# Patient Record
Sex: FEMALE | Race: ASIAN | NOT HISPANIC OR LATINO | URBAN - METROPOLITAN AREA
[De-identification: names, ages, dates, MRNs, and addresses within clinical notes are randomized per-mention and may not be internally consistent; named-entity substitution may affect disease eponyms.]

---

## 2017-06-18 ENCOUNTER — INPATIENT (INPATIENT)
Facility: HOSPITAL | Age: 37
LOS: 11 days | Discharge: HOME | End: 2017-06-30
Attending: HOSPITALIST | Admitting: HOSPITALIST
Payer: MEDICAID

## 2017-06-18 VITALS
SYSTOLIC BLOOD PRESSURE: 121 MMHG | OXYGEN SATURATION: 96 % | RESPIRATION RATE: 26 BRPM | DIASTOLIC BLOOD PRESSURE: 81 MMHG | HEART RATE: 98 BPM | TEMPERATURE: 100 F

## 2017-06-18 LAB
BASE EXCESS BLDV CALC-SCNC: 2.2 MMOL/L — SIGNIFICANT CHANGE UP
BASOPHILS # BLD AUTO: 0.01 K/UL — SIGNIFICANT CHANGE UP (ref 0–0.2)
BASOPHILS NFR BLD AUTO: 0.1 % — SIGNIFICANT CHANGE UP (ref 0–2)
BLOOD GAS VENOUS - CREATININE: 0.61 MG/DL — SIGNIFICANT CHANGE UP (ref 0.5–1.3)
CHLORIDE BLDV-SCNC: 104 MMOL/L — SIGNIFICANT CHANGE UP (ref 96–108)
EOSINOPHIL # BLD AUTO: 0.07 K/UL — SIGNIFICANT CHANGE UP (ref 0–0.5)
EOSINOPHIL NFR BLD AUTO: 0.8 % — SIGNIFICANT CHANGE UP (ref 0–6)
GAS PNL BLDV: 134 MMOL/L — LOW (ref 136–146)
GLUCOSE BLDV-MCNC: 105 — HIGH (ref 70–99)
HCO3 BLDV-SCNC: 25 MMOL/L — SIGNIFICANT CHANGE UP (ref 20–27)
HCT VFR BLD CALC: 26.6 % — LOW (ref 34.5–45)
HCT VFR BLDV CALC: 25.5 % — LOW (ref 34.5–45)
HGB BLD-MCNC: 8 G/DL — LOW (ref 11.5–15.5)
HGB BLDV-MCNC: 8.2 G/DL — LOW (ref 11.5–15.5)
IMM GRANULOCYTES NFR BLD AUTO: 0.6 % — SIGNIFICANT CHANGE UP (ref 0–1.5)
LACTATE BLDV-MCNC: 0.8 MMOL/L — SIGNIFICANT CHANGE UP (ref 0.5–2)
LYMPHOCYTES # BLD AUTO: 2.72 K/UL — SIGNIFICANT CHANGE UP (ref 1–3.3)
LYMPHOCYTES # BLD AUTO: 31.5 % — SIGNIFICANT CHANGE UP (ref 13–44)
MCHC RBC-ENTMCNC: 25 PG — LOW (ref 27–34)
MCHC RBC-ENTMCNC: 30.1 % — LOW (ref 32–36)
MCV RBC AUTO: 83.1 FL — SIGNIFICANT CHANGE UP (ref 80–100)
MONOCYTES # BLD AUTO: 0.45 K/UL — SIGNIFICANT CHANGE UP (ref 0–0.9)
MONOCYTES NFR BLD AUTO: 5.2 % — SIGNIFICANT CHANGE UP (ref 2–14)
NEUTROPHILS # BLD AUTO: 5.34 K/UL — SIGNIFICANT CHANGE UP (ref 1.8–7.4)
NEUTROPHILS NFR BLD AUTO: 61.8 % — SIGNIFICANT CHANGE UP (ref 43–77)
PCO2 BLDV: 45 MMHG — SIGNIFICANT CHANGE UP (ref 41–51)
PH BLDV: 7.39 PH — SIGNIFICANT CHANGE UP (ref 7.32–7.43)
PLATELET # BLD AUTO: 109 K/UL — LOW (ref 150–400)
PMV BLD: 9.9 FL — SIGNIFICANT CHANGE UP (ref 7–13)
PO2 BLDV: 25 MMHG — LOW (ref 35–40)
POTASSIUM BLDV-SCNC: 3.7 MMOL/L — SIGNIFICANT CHANGE UP (ref 3.4–4.5)
RBC # BLD: 3.2 M/UL — LOW (ref 3.8–5.2)
RBC # FLD: 14.6 % — HIGH (ref 10.3–14.5)
SAO2 % BLDV: 37.1 % — LOW (ref 60–85)
WBC # BLD: 8.64 K/UL — SIGNIFICANT CHANGE UP (ref 3.8–10.5)
WBC # FLD AUTO: 8.64 K/UL — SIGNIFICANT CHANGE UP (ref 3.8–10.5)

## 2017-06-18 RX ADMIN — Medication 1 MILLIGRAM(S): at 23:50

## 2017-06-18 NOTE — ED ADULT TRIAGE NOTE - CHIEF COMPLAINT QUOTE
BIBEMS for syncope at home. pt Mongolian speaking, requests  to translate. pt was playing with her son when he son fell on the couch. she got nervous, clutched her chest, and syncopized. had mitral valve sx 10 days ago. pt tearful, only responding with nods and shaking head. clutching chest. appears very uncomfortable and SOB.

## 2017-06-18 NOTE — ED ADULT NURSE NOTE - CHIEF COMPLAINT QUOTE
BIBEMS for syncope at home. pt Hebrew speaking, requests  to translate. pt was playing with her son when he son fell on the couch. she got nervous, clutched her chest, and syncopized. had mitral valve sx 10 days ago. pt tearful, only responding with nods and shaking head. clutching chest. appears very uncomfortable and SOB.

## 2017-06-18 NOTE — ED ADULT NURSE NOTE - OBJECTIVE STATEMENT
pt received to rm 9 with c/o syncope. pt a&o3 and ambulator at baseline. pt  states he was playing with their son when the child fell and his wife got nervous and passed out on the couch. pt in ED shaking and unable to speak. pt 10 s/p MVR. pt given 1mg ativan, as per MD order. IV placed labs sent.

## 2017-06-19 DIAGNOSIS — Z98.891 HISTORY OF UTERINE SCAR FROM PREVIOUS SURGERY: Chronic | ICD-10-CM

## 2017-06-19 DIAGNOSIS — Z51.81 ENCOUNTER FOR THERAPEUTIC DRUG LEVEL MONITORING: ICD-10-CM

## 2017-06-19 DIAGNOSIS — I10 ESSENTIAL (PRIMARY) HYPERTENSION: ICD-10-CM

## 2017-06-19 DIAGNOSIS — E78.5 HYPERLIPIDEMIA, UNSPECIFIED: ICD-10-CM

## 2017-06-19 DIAGNOSIS — I05.9 RHEUMATIC MITRAL VALVE DISEASE, UNSPECIFIED: ICD-10-CM

## 2017-06-19 DIAGNOSIS — Z95.2 PRESENCE OF PROSTHETIC HEART VALVE: Chronic | ICD-10-CM

## 2017-06-19 DIAGNOSIS — R47.01 APHASIA: ICD-10-CM

## 2017-06-19 DIAGNOSIS — Z29.9 ENCOUNTER FOR PROPHYLACTIC MEASURES, UNSPECIFIED: ICD-10-CM

## 2017-06-19 DIAGNOSIS — I34.0 NONRHEUMATIC MITRAL (VALVE) INSUFFICIENCY: ICD-10-CM

## 2017-06-19 DIAGNOSIS — D64.9 ANEMIA, UNSPECIFIED: ICD-10-CM

## 2017-06-19 LAB
ALBUMIN SERPL ELPH-MCNC: 3.6 G/DL — SIGNIFICANT CHANGE UP (ref 3.3–5)
ALBUMIN SERPL ELPH-MCNC: 3.7 G/DL — SIGNIFICANT CHANGE UP (ref 3.3–5)
ALP SERPL-CCNC: 104 U/L — SIGNIFICANT CHANGE UP (ref 40–120)
ALP SERPL-CCNC: 99 U/L — SIGNIFICANT CHANGE UP (ref 40–120)
ALT FLD-CCNC: 44 U/L — HIGH (ref 4–33)
ALT FLD-CCNC: 50 U/L — HIGH (ref 4–33)
APTT BLD: 37.6 SEC — HIGH (ref 27.5–37.4)
APTT BLD: 55.2 SEC — HIGH (ref 27.5–37.4)
AST SERPL-CCNC: 30 U/L — SIGNIFICANT CHANGE UP (ref 4–32)
AST SERPL-CCNC: 34 U/L — HIGH (ref 4–32)
BILIRUB SERPL-MCNC: 0.5 MG/DL — SIGNIFICANT CHANGE UP (ref 0.2–1.2)
BILIRUB SERPL-MCNC: 0.5 MG/DL — SIGNIFICANT CHANGE UP (ref 0.2–1.2)
BUN SERPL-MCNC: 12 MG/DL — SIGNIFICANT CHANGE UP (ref 7–23)
BUN SERPL-MCNC: 14 MG/DL — SIGNIFICANT CHANGE UP (ref 7–23)
CALCIUM SERPL-MCNC: 9.2 MG/DL — SIGNIFICANT CHANGE UP (ref 8.4–10.5)
CALCIUM SERPL-MCNC: 9.2 MG/DL — SIGNIFICANT CHANGE UP (ref 8.4–10.5)
CHLORIDE SERPL-SCNC: 98 MMOL/L — SIGNIFICANT CHANGE UP (ref 98–107)
CHLORIDE SERPL-SCNC: 99 MMOL/L — SIGNIFICANT CHANGE UP (ref 98–107)
CHOLEST SERPL-MCNC: 297 MG/DL — HIGH (ref 120–199)
CK MB BLD-MCNC: 0.9 — SIGNIFICANT CHANGE UP (ref 0–2.5)
CK MB BLD-MCNC: 1.11 NG/ML — SIGNIFICANT CHANGE UP (ref 1–4.7)
CK MB BLD-MCNC: 1.34 NG/ML — SIGNIFICANT CHANGE UP (ref 1–4.7)
CK MB BLD-MCNC: SIGNIFICANT CHANGE UP (ref 0–2.5)
CK SERPL-CCNC: 142 U/L — SIGNIFICANT CHANGE UP (ref 25–170)
CK SERPL-CCNC: 157 U/L — SIGNIFICANT CHANGE UP (ref 25–170)
CO2 SERPL-SCNC: 24 MMOL/L — SIGNIFICANT CHANGE UP (ref 22–31)
CO2 SERPL-SCNC: 25 MMOL/L — SIGNIFICANT CHANGE UP (ref 22–31)
CREAT SERPL-MCNC: 0.62 MG/DL — SIGNIFICANT CHANGE UP (ref 0.5–1.3)
CREAT SERPL-MCNC: 0.68 MG/DL — SIGNIFICANT CHANGE UP (ref 0.5–1.3)
GLUCOSE SERPL-MCNC: 105 MG/DL — HIGH (ref 70–99)
GLUCOSE SERPL-MCNC: 107 MG/DL — HIGH (ref 70–99)
HBA1C BLD-MCNC: 5.6 % — SIGNIFICANT CHANGE UP (ref 4–5.6)
HCG SERPL-ACNC: < 5 MIU/ML — SIGNIFICANT CHANGE UP
HCT VFR BLD CALC: 26 % — LOW (ref 34.5–45)
HCT VFR BLD CALC: 26.1 % — LOW (ref 34.5–45)
HDLC SERPL-MCNC: 19 MG/DL — LOW (ref 45–65)
HGB BLD-MCNC: 7.8 G/DL — LOW (ref 11.5–15.5)
HGB BLD-MCNC: 8 G/DL — LOW (ref 11.5–15.5)
INR BLD: 1.09 — SIGNIFICANT CHANGE UP (ref 0.88–1.17)
LIPID PNL WITH DIRECT LDL SERPL: 222 MG/DL — SIGNIFICANT CHANGE UP
MAGNESIUM SERPL-MCNC: 2 MG/DL — SIGNIFICANT CHANGE UP (ref 1.6–2.6)
MCHC RBC-ENTMCNC: 25 PG — LOW (ref 27–34)
MCHC RBC-ENTMCNC: 25.4 PG — LOW (ref 27–34)
MCHC RBC-ENTMCNC: 30 % — LOW (ref 32–36)
MCHC RBC-ENTMCNC: 30.7 % — LOW (ref 32–36)
MCV RBC AUTO: 82.9 FL — SIGNIFICANT CHANGE UP (ref 80–100)
MCV RBC AUTO: 83.3 FL — SIGNIFICANT CHANGE UP (ref 80–100)
PHOSPHATE SERPL-MCNC: 4.5 MG/DL — SIGNIFICANT CHANGE UP (ref 2.5–4.5)
PLATELET # BLD AUTO: 124 K/UL — LOW (ref 150–400)
PLATELET # BLD AUTO: 130 K/UL — LOW (ref 150–400)
PMV BLD: 10.4 FL — SIGNIFICANT CHANGE UP (ref 7–13)
PMV BLD: 9.6 FL — SIGNIFICANT CHANGE UP (ref 7–13)
POTASSIUM SERPL-MCNC: 4 MMOL/L — SIGNIFICANT CHANGE UP (ref 3.5–5.3)
POTASSIUM SERPL-MCNC: 4.5 MMOL/L — SIGNIFICANT CHANGE UP (ref 3.5–5.3)
POTASSIUM SERPL-SCNC: 4 MMOL/L — SIGNIFICANT CHANGE UP (ref 3.5–5.3)
POTASSIUM SERPL-SCNC: 4.5 MMOL/L — SIGNIFICANT CHANGE UP (ref 3.5–5.3)
PROT SERPL-MCNC: 7.1 G/DL — SIGNIFICANT CHANGE UP (ref 6–8.3)
PROT SERPL-MCNC: 7.4 G/DL — SIGNIFICANT CHANGE UP (ref 6–8.3)
PROTHROM AB SERPL-ACNC: 12.2 SEC — SIGNIFICANT CHANGE UP (ref 9.8–13.1)
RBC # BLD: 3.12 M/UL — LOW (ref 3.8–5.2)
RBC # BLD: 3.15 M/UL — LOW (ref 3.8–5.2)
RBC # FLD: 14.4 % — SIGNIFICANT CHANGE UP (ref 10.3–14.5)
RBC # FLD: 14.8 % — HIGH (ref 10.3–14.5)
SODIUM SERPL-SCNC: 137 MMOL/L — SIGNIFICANT CHANGE UP (ref 135–145)
SODIUM SERPL-SCNC: 138 MMOL/L — SIGNIFICANT CHANGE UP (ref 135–145)
TRIGL SERPL-MCNC: 278 MG/DL — HIGH (ref 10–149)
TROPONIN T SERPL-MCNC: < 0.06 NG/ML — SIGNIFICANT CHANGE UP (ref 0–0.06)
TROPONIN T SERPL-MCNC: < 0.06 NG/ML — SIGNIFICANT CHANGE UP (ref 0–0.06)
TSH SERPL-MCNC: 2.42 UIU/ML — SIGNIFICANT CHANGE UP (ref 0.27–4.2)
WBC # BLD: 7.28 K/UL — SIGNIFICANT CHANGE UP (ref 3.8–10.5)
WBC # BLD: 8.01 K/UL — SIGNIFICANT CHANGE UP (ref 3.8–10.5)
WBC # FLD AUTO: 7.28 K/UL — SIGNIFICANT CHANGE UP (ref 3.8–10.5)
WBC # FLD AUTO: 8.01 K/UL — SIGNIFICANT CHANGE UP (ref 3.8–10.5)

## 2017-06-19 PROCEDURE — 70498 CT ANGIOGRAPHY NECK: CPT | Mod: 26,52

## 2017-06-19 PROCEDURE — 70450 CT HEAD/BRAIN W/O DYE: CPT | Mod: 26,77

## 2017-06-19 PROCEDURE — 70496 CT ANGIOGRAPHY HEAD: CPT | Mod: 26

## 2017-06-19 PROCEDURE — 99223 1ST HOSP IP/OBS HIGH 75: CPT

## 2017-06-19 PROCEDURE — 99223 1ST HOSP IP/OBS HIGH 75: CPT | Mod: AI

## 2017-06-19 PROCEDURE — 99284 EMERGENCY DEPT VISIT MOD MDM: CPT

## 2017-06-19 PROCEDURE — 70450 CT HEAD/BRAIN W/O DYE: CPT | Mod: 26,59

## 2017-06-19 PROCEDURE — 99356: CPT

## 2017-06-19 RX ORDER — ENOXAPARIN SODIUM 100 MG/ML
80 INJECTION SUBCUTANEOUS EVERY 12 HOURS
Qty: 0 | Refills: 0 | Status: DISCONTINUED | OUTPATIENT
Start: 2017-06-19 | End: 2017-06-19

## 2017-06-19 RX ORDER — GABAPENTIN 400 MG/1
1 CAPSULE ORAL
Qty: 0 | Refills: 0 | COMMUNITY

## 2017-06-19 RX ORDER — HEPARIN SODIUM 5000 [USP'U]/ML
6500 INJECTION INTRAVENOUS; SUBCUTANEOUS EVERY 6 HOURS
Qty: 0 | Refills: 0 | Status: DISCONTINUED | OUTPATIENT
Start: 2017-06-19 | End: 2017-06-21

## 2017-06-19 RX ORDER — ACETAMINOPHEN 500 MG
650 TABLET ORAL EVERY 6 HOURS
Qty: 0 | Refills: 0 | Status: DISCONTINUED | OUTPATIENT
Start: 2017-06-19 | End: 2017-06-30

## 2017-06-19 RX ORDER — KETOROLAC TROMETHAMINE 30 MG/ML
15 SYRINGE (ML) INJECTION ONCE
Qty: 0 | Refills: 0 | Status: DISCONTINUED | OUTPATIENT
Start: 2017-06-19 | End: 2017-06-19

## 2017-06-19 RX ORDER — HEPARIN SODIUM 5000 [USP'U]/ML
INJECTION INTRAVENOUS; SUBCUTANEOUS
Qty: 25000 | Refills: 0 | Status: DISCONTINUED | OUTPATIENT
Start: 2017-06-19 | End: 2017-06-21

## 2017-06-19 RX ORDER — WARFARIN SODIUM 2.5 MG/1
7.5 TABLET ORAL ONCE
Qty: 0 | Refills: 0 | Status: COMPLETED | OUTPATIENT
Start: 2017-06-19 | End: 2017-06-19

## 2017-06-19 RX ORDER — ATORVASTATIN CALCIUM 80 MG/1
40 TABLET, FILM COATED ORAL AT BEDTIME
Qty: 0 | Refills: 0 | Status: DISCONTINUED | OUTPATIENT
Start: 2017-06-19 | End: 2017-06-30

## 2017-06-19 RX ORDER — FONDAPARINUX SODIUM 2.5 MG/.5ML
2.5 INJECTION, SOLUTION SUBCUTANEOUS DAILY
Qty: 0 | Refills: 0 | Status: DISCONTINUED | OUTPATIENT
Start: 2017-06-19 | End: 2017-06-19

## 2017-06-19 RX ORDER — HEPARIN SODIUM 5000 [USP'U]/ML
3000 INJECTION INTRAVENOUS; SUBCUTANEOUS EVERY 6 HOURS
Qty: 0 | Refills: 0 | Status: DISCONTINUED | OUTPATIENT
Start: 2017-06-19 | End: 2017-06-21

## 2017-06-19 RX ADMIN — HEPARIN SODIUM 1600 UNIT(S)/HR: 5000 INJECTION INTRAVENOUS; SUBCUTANEOUS at 22:42

## 2017-06-19 RX ADMIN — Medication 650 MILLIGRAM(S): at 16:30

## 2017-06-19 RX ADMIN — Medication 650 MILLIGRAM(S): at 15:50

## 2017-06-19 RX ADMIN — Medication 15 MILLIGRAM(S): at 17:00

## 2017-06-19 RX ADMIN — HEPARIN SODIUM 1400 UNIT(S)/HR: 5000 INJECTION INTRAVENOUS; SUBCUTANEOUS at 15:22

## 2017-06-19 RX ADMIN — Medication 15 MILLIGRAM(S): at 17:15

## 2017-06-19 RX ADMIN — FONDAPARINUX SODIUM 2.5 MILLIGRAM(S): 2.5 INJECTION, SOLUTION SUBCUTANEOUS at 06:47

## 2017-06-19 RX ADMIN — ATORVASTATIN CALCIUM 40 MILLIGRAM(S): 80 TABLET, FILM COATED ORAL at 22:40

## 2017-06-19 RX ADMIN — WARFARIN SODIUM 7.5 MILLIGRAM(S): 2.5 TABLET ORAL at 19:01

## 2017-06-19 NOTE — H&P ADULT - NSHPSOCIALHISTORY_GEN_ALL_CORE
Pt is  and lives with her . She is a former [employer] employee but has not been working since her hospitalization for MVR. Denies smoking, drinking or illicit drug use.

## 2017-06-19 NOTE — H&P ADULT - PROBLEM SELECTOR PLAN 2
History of recent MVR on Lovenox and Coumadin (INR subtherapeutic likely due to lack of patient education)  Start Arixtra  Check repeat echocardiogram

## 2017-06-19 NOTE — H&P ADULT - NEGATIVE OPHTHALMOLOGIC SYMPTOMS
no loss of vision R/no diplopia/no photophobia/no loss of vision L/no pain R/no blurred vision R/no blurred vision L/no pain L

## 2017-06-19 NOTE — H&P ADULT - PMH
HLD (hyperlipidemia)    HTN (hypertension)    MR (mitral regurgitation)  S/P mechanical MVR on 6/7/17 at [facility]  MVP (mitral valve prolapse)    Osteomyelitis of arm  Right arm osteomyelitis, treated in [facility] in 1998

## 2017-06-19 NOTE — H&P ADULT - PROBLEM SELECTOR PLAN 1
Admit to telemetry, serial EKGs, serial cardiac enzymes  Check CBC, CMP, TSH, FLP, HgA1C, UA  Neuro checks and vitals q4hrs, orthostatics  Fall and aspiration precautions  Neuro consult appreciated  CT head and CT angio head/neck negative for acute stroke  MRI head ordered (unable to do given mechanical valve)  Consider repeat CT head  Symptoms improving but not back to baseline  S&S eval ordered; NPO until then   Stop Lovenox and Coumadin; start Arixtra   Echocardiogram ordered  PM&R eval ordered  F/U MD note

## 2017-06-19 NOTE — H&P ADULT - MOTOR
Generalized weakness throughout with no focal deficits  4+/5 strength and ROM to upper and lower extremities bilaterally

## 2017-06-19 NOTE — H&P ADULT - FAMILY HISTORY
Mother  Still living? Yes, Estimated age: Age Unknown  Family history of early CAD, Age at diagnosis: Age Unknown     Sibling  Still living? Yes, Estimated age: Age Unknown  Family history of valvular heart disease, Age at diagnosis: Age Unknown

## 2017-06-19 NOTE — H&P ADULT - NEGATIVE GASTROINTESTINAL SYMPTOMS
no abdominal pain/no vomiting/no change in bowel habits/no nausea/no hematochezia/no constipation/no flatulence/no diarrhea/no melena

## 2017-06-19 NOTE — ED PROVIDER NOTE - CARDIAC, MLM
Normal rate, regular rhythm.  Heart sounds S1, S2.  No murmurs, rubs or gallops. Normal rate, regular rhythm.  Heart sounds S1, S2.  + mechanical valve sounds

## 2017-06-19 NOTE — CHART NOTE - NSCHARTNOTEFT_GEN_A_CORE
Discussed case with Dr. Malhotra from cardiology. Recommend starting Heparin gtt for A/C with bridge to Coumadin. Ordered. Spoke to CDU RN. Discussed case with Dr. Malhotra from cardiology. Recommend starting Heparin gtt for A/C with bridge to Coumadin. Ordered. Spoke to CDU RN.  7:00pm addendum: Pt and  requested to sign AMA. I verbally explained at bed side extensively about choice of AC for mechanical valve replacement, ONLY heparin is indicated and can provide therapeutic effect to prevent thrombosis, while bridging on coumadin toward goal INR 2.5-3.5. Lovenox is not optimized Tx for AC for mechanical valve, especially when INR subtherapeutic. Pt can not be medically stable discharged on Lovenox as clarified with cardiology consult. Both  and Pt clearly demonstrate AAO *3, verbally expressed understanding the risk of CVA, PE or other thrombosis. They will discuss again.  Time spent > 35min during above conversation.

## 2017-06-19 NOTE — H&P ADULT - ASSESSMENT
38 y/o female with a PMHx of severe MR S/P MVR at [facility] on 6/7 and discharged on 6/4 (discharged with a subtherapeutic INR on a Lovenox to Coumadin bridge but wasn't educated on Coumadin), HTN, and HLD presents to ED with aphasia concerning for CVA.

## 2017-06-19 NOTE — ED PROVIDER NOTE - CONSTITUTIONAL, MLM
normal... Well appearing, well nourished, awake, alert, oriented to person, place, time/situation and in exhibiting expressive aphasia

## 2017-06-19 NOTE — H&P ADULT - NEUROLOGICAL DETAILS
responds to verbal commands/alert and oriented x 3/cranial nerves intact/sensation intact/responds to pain

## 2017-06-19 NOTE — CONSULT NOTE ADULT - SUBJECTIVE AND OBJECTIVE BOX
Neurology consult    ДМИТРИЙ [id number]     Patient is a 37y old  Female who presents with a chief complaint of     HPI: 37 F recent MVR on Lovenox and ASA within past 2 weeks. Pt was at home when  dropped their baby. When patient saw this she gasped, grabbed her right chest, possibly syncopized and since then has stopped talking. Per  there may have been a transient distortion of her face. Ed concerned for seizure gave patient ativan,  unsure if it helped. physical therapy nods to questions, but no verbal output. ROS deferred as patient not responding.      MEDICATIONS        PMH: Osteomyelitis of arm  MVP (mitral valve prolapse)  Seizure disorder  Hypercholesterolemia       PSH:  delivery delivered      Family history: No history of dementia, strokes, or seizures   FAMILY HISTORY:  Family history of early CAD      SOCIAL HISTORY:  No history of tobacco or alcohol use     Allergies    No Known Allergies    Intolerances            Vital Signs Last 24 Hrs  T(C): 36.9, Max: 37.7 (-18 @ 23:09)  T(F): 98.4, Max: 99.8 (-18 @ 23:09)  HR: 94 (94 - 98)  BP: 126/55 (121/81 - 127/87)  BP(mean): --  RR: 18 (18 - 30)  SpO2: 98% (96% - 98%)      On Neurological Examination:    Mental Status mildly limited as patient s/p ativan eyes closed opens to verbal commands follows some simple commands patient has limited vebral output tearful, states her name with difficulty    Cranial Nerves - PERRL, EOMI, VFF, V1-V3 intact, questionable mild right NL flattening tongue/uvula midline    Motor Exam -    5/5 throughout no drift   nml bulk/tone    Sensory    Intact to light touch and pinprick bilaterally    Coord: FTN intact bilaterally     Gait -   short tremulous steps will fall to examiner or  if nearby if not will walk w/o falling                                                LABS:  CBC Full  -  ( 2017 23:29 )  WBC Count : 8.64 K/uL  Hemoglobin : 8.0 g/dL  Hematocrit : 26.6 %  Platelet Count - Automated : 109 K/uL  Mean Cell Volume : 83.1 fL  Mean Cell Hemoglobin : 25.0 pg  Mean Cell Hemoglobin Concentration : 30.1 %  Auto Neutrophil # : 5.34 K/uL  Auto Lymphocyte # : 2.72 K/uL  Auto Monocyte # : 0.45 K/uL  Auto Eosinophil # : 0.07 K/uL  Auto Basophil # : 0.01 K/uL  Auto Neutrophil % : 61.8 %  Auto Lymphocyte % : 31.5 %  Auto Monocyte % : 5.2 %  Auto Eosinophil % : 0.8 %  Auto Basophil % : 0.1 %          137  |  98  |  14  ----------------------------<  105<H>  4.0   |  25  |  0.68    Ca    9.2      2017 23:29    TPro  7.4  /  Alb  3.7  /  TBili  0.5  /  DBili  x   /  AST  34<H>  /  ALT  50<H>  /  AlkPhos  104      LIVER FUNCTIONS - ( 2017 23:29 )  Alb: 3.7 g/dL / Pro: 7.4 g/dL / ALK PHOS: 104 u/L / ALT: 50 u/L / AST: 34 u/L / GGT: x           Hemoglobin A1C:       PT/INR - ( 2017 23:29 )   PT: 12.2 SEC;   INR: 1.09          PTT - ( 2017 23:29 )  PTT:37.6 SEC      RADIOLOGY  CTH   MRI:

## 2017-06-19 NOTE — ED PROVIDER NOTE - PROGRESS NOTE DETAILS
rufino stephen communicated wtih concerns for HIT, given low plts, unable to obtain prior, will give fondaparinux after receiving ativan, pt had transient improvement in speech, however did not have 100% recovery; as of 4:24 pt continues to exhibit aphasia

## 2017-06-19 NOTE — H&P ADULT - NEGATIVE PSYCHIATRIC SYMPTOMS
no suicidal ideation/no depression/no auditory hallucinations/no visual hallucinations/no agitation/no anxiety

## 2017-06-19 NOTE — CONSULT NOTE ADULT - ATTENDING COMMENTS
Pt seen and examined. Pt is able to speak and walk without assistance. Given her vascular risk factors, will repeat HCT w/o contrast. Pt has mechanical valve as contraindication for MRI. Pt seen and examined. Pt is able to speak and walk without assistance. Given her vascular risk factors, will repeat HCT w/o contrast. Pt has mechanical valve as contraindication for MRI.    Repeat HCT shows no infarcts.

## 2017-06-19 NOTE — CONSULT NOTE ADULT - ASSESSMENT
Probably acute embolic stroke in setting of recent MVR.  She was supposed to be on Lovenox with bridge to warfarin, but apparently was only taking Lovenox.  Will recommend that she start heparin gtt with bridge to warfarin with INR goal 2.5 to 3.5.

## 2017-06-19 NOTE — H&P ADULT - RS GEN PE MLT RESP DETAILS PC
respirations non-labored/no rales/breath sounds equal/no rhonchi/no chest wall tenderness/good air movement/no wheezes/airway patent/no intercostal retractions/clear to auscultation bilaterally

## 2017-06-19 NOTE — ED PROVIDER NOTE - PMH
Hypercholesterolemia    MVP (mitral valve prolapse)    Osteomyelitis of arm  R, treated in [facility] in 1998

## 2017-06-19 NOTE — H&P ADULT - NSHPLABSRESULTS_GEN_ALL_CORE
Cardiac cath, October 2016: Normal coronary arteries. Normal LV systolic function. Severe mitral regurgitation. Mitral valve prolapse.   EVELINA, October 2016: Thickened mitral leaflets with mitral valve prolapse involving the middle (A2) segment of the anterior mitral leaflet. Severe mitral regurgitation with an eccentric, posteriorly directed jet. Systolic reversal of flow seen in the left superior pulmonary vein. No left atrium or left atrial appendage thrombus. Normal left ventricular systolic function. No segmental wall motion abnormalities.  ----------  EKG: NSR at 94 bpm, Q wave III, TWI III, AVF  CE x2: Negative  CT head: No acute intracranial hemorrhage, mass effect, or shift.  H/H: 8.0/26.6  Platelets: 109

## 2017-06-19 NOTE — H&P ADULT - ATTENDING COMMENTS
Pt was seen and evaluated at bed side. 38 y/o female with a PMHx of severe MR S/P MVR at [facility] on 6/7 and discharged on 6/4 (discharged with a subtherapeutic INR on a Lovenox to Coumadin bridge but wasn't educated on Coumadin), HTN, and HLD presents to ED with aphasia concerning for CVA. neurology consulted, f/u repeat CT head. CE negative. Cardiology consulted. on Tele monitor.   Pt AAO*3, mental status back to baseline as confirmed with . Called [facility] office and discussed with NP Patricia. Pt, H/h 8.9, Plt 117 on discharge, Was instructed to take lovenox 80mg BID with coumadin 7.5, goal INR 2.5-3.5, pt to follow up with Dr. Valladares for INR monitoring. repeat Echo 6/8 EF 64%, s/p mechanical valve replacement. Pt off Lasix (need 10 days post-op). Low suspicion of HIT, will restart lovenox and coumadin, f/u H/h and INR.   Discussed with  and Pt above plan in details. Pt was seen and evaluated at bed side. 36 y/o female with a PMHx of severe MR S/P MVR at [facility] on 6/7 and discharged on 6/4 (discharged with a subtherapeutic INR on a Lovenox to Coumadin bridge but wasn't educated on Coumadin), HTN, and HLD presents to ED with aphasia concerning for CVA. neurology consulted, f/u repeat CT head. CE negative. Cardiology consulted. on Tele monitor.   Pt AAO*3, mental status back to baseline as confirmed with . Called [facility] office and discussed with NP Patricia. Pt, H/h 8.9, Plt 117 on discharge, Was instructed to take lovenox 80mg BID with coumadin 7.5, goal INR 2.5-3.5, pt to follow up with Dr. Valladares for INR monitoring. repeat Echo 6/8 EF 64%, s/p mechanical valve replacement. Pt off Lasix (need 10 days post-op). Low suspicion of HIT, will restart lovenox and coumadin, f/u H/h and INR.   Discussed with  and Pt above plan in details.  Addendum 14:30pm: as discussed with cardiology Dr. Malhotra, recommended heparin gtt bridging with coumadin, and repeat ECHO to access Mitral valve.

## 2017-06-19 NOTE — H&P ADULT - HISTORY OF PRESENT ILLNESS
36 y/o female with a PMHx of severe MR S/P mechanical MVR at [facility] on 6/7/17 with Dr. Geri Mueller, HTN and HLD presents to ED with dysphagia since this morning. Pt was found to have severe MR and underwent successful mechanical mitral valve replacement on June 7, 2017 at [facility] with Dr. Geri Mueller. Pt was hospitalized until June 14, 2017 and was being given Lovenox with a bridge to Coumadin. Pt was apparently discharged with a subtherapeutic INR (as per , INR was less than 2.5) and was given Lovenox injections. Pt had a visiting nurse come to the house on Margarette 15, 2017 (the day after discharge) to educate the patient on how to inject the Lovenox. Pt and her  have been able to self inject the Lovenox since then but patient has not been taking Coumadin. As per , they were told to start Coumadin after her Lovenox injections were completed. Last night, patient's  was playing with their 4 year old toddler and he feel off his father's shoulders. When this happened, pt became very scared, startled and started shaking uncontrollably. Pt subsequently lost consciousness while sitting on the sofa. Pt did not have any head or bodily trauma.  called 911 immediately. Pt was unconsciousness for over 5 minutes until EMS came. EMS was able to shake her and wake her up, but since then she has been completely aphasic and expresses generalized weakness. Pt is independent at baseline. Since she has been here,  reports that her aphasia is improving but she is not back at her baseline functionality. 38 y/o female with a PMHx of severe MR S/P mechanical MVR at [facility] on 6/7/17 with Dr. Geri Mueller, HTN and HLD presents to ED with dysphagia since this morning. Pt was found to have severe MR and underwent successful mechanical mitral valve replacement on June 7, 2017 at [facility] with Dr. Geri Mueller. Pt was hospitalized until June 14, 2017 and was being given Lovenox with a bridge to Coumadin. Pt was apparently discharged with a subtherapeutic INR (as per , INR was less than 2.5) and was given Lovenox injections. Pt had a visiting nurse come to the house on Margarette 15, 2017 (the day after discharge) to educate the patient on how to inject the Lovenox. Pt and her  have been able to self inject the Lovenox since then but patient has not been taking Coumadin. As per , they were told to start Coumadin after her Lovenox injections were completed. Last night, patient's  was playing with their 4 year old toddler and he feel off his father's shoulders. When this happened, pt became very scared, startled and started shaking uncontrollably. Pt subsequently lost consciousness while sitting on the sofa. Pt did not have any head or bodily trauma.  called 911 immediately. Pt was unconsciousness for over 5 minutes until EMS came. EMS was able to shake her and wake her up, but since then she has been completely aphasic and expresses generalized weakness. Pt is independent at baseline. Since she has been here,  reports that her aphasia is improving but she is not back at her baseline functionality. Pt denies fever, chills, recent travel, headache, dizziness, visual deficits, tinnitus, chest pain, shortness of breath, palpitations, abdominal pain, N/V/D/C, hematochezia, melena, dysuria, hematuria, urinary/fecal incontinence, aura, tongue lacerations. Upon arrival to ED, EKG revealed NSR at 94 bpm, Q wave III, TWI III, AVF. Pt ruled out for ACS with two sets of negative cardiac enzymes. CT head revealed no acute intracranial hemorrhage, mass effect, or shift. H/H: 8.0/26.6. Platelets: 109. Pt is now admitted to telemetry.

## 2017-06-19 NOTE — ED PROVIDER NOTE - ATTENDING CONTRIBUTION TO CARE
I performed a face-to-face evaluation of the patient and performed a history and physical examination. I agree with the history and physical examination.    Leland: 37 F, recently s/p MVR, on Lovenox, tonight her child almost fell and she panicked. Since then, she has not been speaking. No trauma. AFVSS. NAD. Patient's receptive speech intact. Follows commands. Will say occasional appropriate words (eg, her name), but, by and large, is not speaking. Will get head CT, Neuro consult.

## 2017-06-19 NOTE — ED PROVIDER NOTE - OBJECTIVE STATEMENT
37f with pmh of mitral valve repair/replacement ( on reid, currently on lovenox and asa) p/w difficulty talking s/p witnessing  drop baby, pt then gasped, and fell to side, and reportedly faint (no definite head trauma, fell to side), since then pt wont' talk. about to move all extremities and follow commands however; no h/o reported cp/sob/v/d. time of onset ~11pm

## 2017-06-19 NOTE — ED PROVIDER NOTE - MEDICAL DECISION MAKING DETAILS
eLland: 37 F, recently s/p MVR, on Lovenox, tonight her child almost fell and she panicked. Since then, she has not been speaking. No trauma. AFVSS. NAD. Patient's receptive speech intact. Follows commands. Will say occasional appropriate words (eg, her name), but, by and large, is not speaking. Will get head CT, Neuro consult.

## 2017-06-19 NOTE — H&P ADULT - NEGATIVE CARDIOVASCULAR SYMPTOMS
no paroxysmal nocturnal dyspnea/no orthopnea/no dyspnea on exertion/no claudication/no chest pain/no palpitations/no peripheral edema

## 2017-06-19 NOTE — H&P ADULT - PSH
S/P     S/P MVR (mitral valve replacement)  Mechanical MVR at [facility] on 17

## 2017-06-19 NOTE — CONSULT NOTE ADULT - ASSESSMENT
37 F recent MVR on Lovenox and ASA presents with expressive aphasia chest pain after seeing her child fall. PE grossly expressive aphasia, ? right NL flattening with anxious overlay. CTH negative suspicion for central process is low, would r/o with CTA H/N now and MRI w/o con in CDU overnight. Would get psych consult

## 2017-06-19 NOTE — H&P ADULT - NSHPLANGTRANSLATORFT_GEN_A_CORE
Pt understands English.  services offered.  at bedside (fluent in English) able to help with translation.

## 2017-06-19 NOTE — CONSULT NOTE ADULT - SUBJECTIVE AND OBJECTIVE BOX
Cardiology/Vascular Medicine Inpatient Consultation Note    Asked by Dr. Davis to see patient re: anticoagulation management in setting of recent MVR.      HISTORY OF PRESENT ILLNESS:  Patient is a 38 yo woman with history of severe MR s/p mechanical MVR at [facility] on 17 with Dr. Ervin Nevarez, She also has a history of HTN and HLD presents to ED with dysphagia since this morning. Pt was found to have severe MR and underwent successful mechanical mitral valve replacement on 2017 at [facility] with Dr. Geri Mueller. Pt was hospitalized until 2017 and was being given Lovenox with a bridge to Coumadin. Pt was apparently discharged with a subtherapeutic INR (as per , INR was less than 2.5) and was given Lovenox injections. Pt had a visiting nurse come to the house on Margarette 15, 2017 (the day after discharge) to educate the patient on how to inject the Lovenox. Pt and her  have been able to self inject the Lovenox since then but patient has not been taking Coumadin. As per , they were told to start Coumadin after her Lovenox injections were completed. Last night, patient's  was playing with their 4 year old toddler and he feel off his father's shoulders. When this happened, pt became very scared, startled and started shaking uncontrollably. Pt subsequently lost consciousness while sitting on the sofa. Pt did not have any head or bodily trauma.  called 911 immediately. Pt was unconsciousness for over 5 minutes until EMS came. EMS was able to shake her and wake her up, but since then she has been completely aphasic and expresses generalized weakness. Pt is independent at baseline. Since she has been here,  reports that her aphasia is improving but she is not back at her baseline functionality. Pt denies fever, chills, recent travel, headache, dizziness, visual deficits, tinnitus, chest pain, shortness of breath, palpitations, abdominal pain, N/V/D/C, hematochezia, melena, dysuria, hematuria, urinary/fecal incontinence, aura, tongue lacerations. Upon arrival to ED, EKG revealed NSR at 94 bpm, Q wave III, TWI III, AVF. Pt ruled out for ACS with two sets of negative cardiac enzymes. CT head revealed no acute intracranial hemorrhage, mass effect, or shift. H/H: 8.0/26.6. Platelets: 109. Pt is now admitted to telemetry. (2017 08:17)          Allergies    No Known Allergies    Intolerances    	    MEDICATIONS:  enoxaparin Injectable 80milliGRAM(s) SubCutaneous every 12 hours  warfarin 7.5milliGRAM(s) Oral once            atorvastatin 40milliGRAM(s) Oral at bedtime        PAST MEDICAL & SURGICAL HISTORY:  MR (mitral regurgitation): S/P mechanical MVR on 17 at [facility]  HLD (hyperlipidemia)  HTN (hypertension)  Osteomyelitis of arm: Right arm osteomyelitis, treated in [facility] in   MVP (mitral valve prolapse)  Seizure disorder  Hypercholesterolemia  S/P MVR (mitral valve replacement): Mechanical MVR at [facility] on 17  S/P    delivery delivered      FAMILY HISTORY:  Family history of valvular heart disease (Sibling)  Family history of early CAD (Mother)      SOCIAL HISTORY:    [ ] Non-smoker  [ ] Smoker  [ ] Alcohol    REVIEW OF SYSTEMS:  CONSTITUTIONAL: No fever, weight loss, or fatigue  EYES: No eye pain, visual disturbances, or discharge  ENMT:  No difficulty hearing, tinnitus, vertigo; No sinus or throat pain  NECK: No pain or stiffness  RESPIRATORY: No cough, wheezing, chills or hemoptysis; No Shortness of Breath  CARDIOVASCULAR: No chest pain, palpitations, passing out, dizziness, or leg swelling  GASTROINTESTINAL: No abdominal or epigastric pain. No nausea, vomiting, or hematemesis; No diarrhea or constipation. No melena or hematochezia.  GENITOURINARY: No dysuria, frequency, hematuria, or incontinence  NEUROLOGICAL: No headaches, memory loss, loss of strength, numbness, or tremors  SKIN: No itching, burning, rashes, or lesions   LYMPH Nodes: No enlarged glands  ENDOCRINE: No heat or cold intolerance; No hair loss  MUSCULOSKELETAL: No joint pain or swelling; No muscle, back, or extremity pain  PSYCHIATRIC: No depression, anxiety, mood swings, or difficulty sleeping  HEME/LYMPH: No easy bruising, or bleeding gums  ALLERY AND IMMUNOLOGIC: No hives or eczema	    [ ] All others negative	  [ ] Unable to obtain    PHYSICAL EXAM:  T(C): 37.2, Max: 37.7 (06-18 @ 23:09)  HR: 91 (91 - 98)  BP: 110/55 (110/55 - 127/87)  RR: 16 (16 - 30)  SpO2: 98% (94% - 100%)  Wt(kg): --  I&O's Summary      Appearance: Normal	  HEENT:   Normal oral mucosa, PERRL, EOMI	  Lymphatic: No lymphadenopathy  Cardiovascular: Normal S1 S2, No JVD, No murmurs, No edema  Respiratory: Lungs clear to auscultation	  Psychiatry: A & O x 3, Mood & affect appropriate  Gastrointestinal:  Soft, Non-tender, + BS	  Skin: No rashes, No ecchymoses, No cyanosis	  Neurologic: Non-focal  Extremities: Normal range of motion, No clubbing, cyanosis or edema  Vascular: Peripheral pulses palpable 2+ bilaterally      LABS:	 	    CBC Full  -  ( 2017 06:52 )  WBC Count : 8.01 K/uL  Hemoglobin : 7.8 g/dL  Hematocrit : 26.0 %  Platelet Count - Automated : 130 K/uL  Mean Cell Volume : 83.3 fL  Mean Cell Hemoglobin : 25.0 pg  Mean Cell Hemoglobin Concentration : 30.0 %  Auto Neutrophil # : x  Auto Lymphocyte # : x  Auto Monocyte # : x  Auto Eosinophil # : x  Auto Basophil # : x  Auto Neutrophil % : x  Auto Lymphocyte % : x  Auto Monocyte % : x  Auto Eosinophil % : x  Auto Basophil % : x        138  |  99  |  12  ----------------------------<  107<H>  4.5   |  24  |  0.62  18    137  |  98  |  14  ----------------------------<  105<H>  4.0   |  25  |  0.68    Ca    9.2      2017 06:52  Ca    9.2      2017 23:29  Phos  4.5       Mg     2.0         TPro  7.1  /  Alb  3.6  /  TBili  0.5  /  DBili  x   /  AST  30  /  ALT  44<H>  /  AlkPhos  99    TPro  7.4  /  Alb  3.7  /  TBili  0.5  /  DBili  x   /  AST  34<H>  /  ALT  50<H>  /  AlkPhos  104        proBNP:   Lipid Profile: Cholesterol, [id number] mg/dL<H> [120 - 199]  Direct ZTD213 mg/dL  HDL Cholesterol, serum19 mg/dL<L> [45 - 65]  Triglycerides, fuuoc913 mg/dL<H> [10 - 149]    HgA1c: Hemoglobin A1C, Whole Blood: 5.6 % ( @ 06:52)    TSH: Thyroid Stimulating Hormone, Serum: 2.42 uIU/mL ( @ 06:52)        CARDIAC MARKERS:      CKMB: 1.11 ng/mL ( @ :52)  CKMB: 1.34 ng/mL ( @ 23:29)    CKMB Relative Index: Test not performed ( @ 06:52)  CKMB Relative Index: 0.9 ( @ 23:29)      TELEMETRY: 	    ECG:   	  RADIOLOGY:  OTHER: 	      PREVIOUS DIAGNOSTIC CARDIOVASCULAR TESTING:      [ ]  Echocardiogram:  [ ]  Catheterization:  [ ]  Stress test:    [ ]  Vascular studies: Cardiology/Vascular Medicine Inpatient Consultation Note    Asked by Dr. Davis to see patient re: anticoagulation management in setting of recent MVR.      HISTORY OF PRESENT ILLNESS:  Patient is a 36 yo woman with history of severe MR s/p mechanical MVR at [facility] on 17 with Dr. Ervin Nevarez, She also has a history of HTN and HLD presents to ED with dysphagia since this morning. Pt was hospitalized until 2017 and was being given Lovenox with a bridge to Coumadin. Pt was apparently discharged with a subtherapeutic INR (as per , INR was less than 2.5) and was given Lovenox injections. She presented with symptoms concerning for acute stroke.  Thus far, she had 2 head CT scans which have been unremarkable for acute stroke.  She cannot have brain MRI because of the presence of a mechanical valve.      Allergies  No Known Allergies    MEDICATIONS:  enoxaparin Injectable 80milliGRAM(s) SubCutaneous every 12 hours  warfarin 7.5milliGRAM(s) Oral once  atorvastatin 40milliGRAM(s) Oral at bedtime        PAST MEDICAL & SURGICAL HISTORY:  MR (mitral regurgitation): S/P mechanical MVR on 17 at [place]   HLD (hyperlipidemia)  HTN (hypertension)  Osteomyelitis of arm: Right arm osteomyelitis, treated in [facility] in   MVP (mitral valve prolapse)  Seizure disorder  Hypercholesterolemia  S/P MVR (mitral valve replacement): Mechanical MVR at [facility] on 17  S/P    delivery delivered      FAMILY HISTORY:  Family history of valvular heart disease (Sibling)  Family history of early CAD (Mother)    SOCIAL HISTORY:    [ ] Non-smoker      REVIEW OF SYSTEMS:  CONSTITUTIONAL: No fever, weight loss, or fatigue  EYES: No eye pain, visual disturbances, or discharge  ENMT:  No difficulty hearing, tinnitus, vertigo; No sinus or throat pain  NECK: No pain or stiffness  RESPIRATORY: No cough, wheezing, chills or hemoptysis; No Shortness of Breath  CARDIOVASCULAR: No chest pain, palpitations, passing out, dizziness, or leg swelling  GASTROINTESTINAL: No abdominal or epigastric pain. No nausea, vomiting, or hematemesis; No diarrhea or constipation. No melena or hematochezia.  GENITOURINARY: No dysuria, frequency, hematuria, or incontinence  NEUROLOGICAL: as above  SKIN: No itching, burning, rashes, or lesions   LYMPH Nodes: No enlarged glands  ENDOCRINE: No heat or cold intolerance; No hair loss  MUSCULOSKELETAL: No joint pain or swelling; No muscle, back, or extremity pain  PSYCHIATRIC: No depression, anxiety, mood swings, or difficulty sleeping  HEME/LYMPH: No easy bruising, or bleeding gums  ALLERY AND IMMUNOLOGIC: No hives or eczema	      PHYSICAL EXAM:  T(C): 37.2, Max: 37.7 (-18 @ 23:09)  HR: 91 (91 - 98)  BP: 110/55 (110/55 - 127/87)  RR: 16 (16 - 30)  SpO2: 98% (94% - 100%)  Wt(kg): --  I&O's Summary      Appearance: Normal	  HEENT:   Normal oral mucosa, PERRL, EOMI	  Lymphatic: No lymphadenopathy  Cardiovascular: Normal S1 S2, No JVD, No murmurs, No edema  Respiratory: Lungs clear to auscultation	  Psychiatry: A & O x 3, Mood & affect appropriate  Gastrointestinal:  Soft, Non-tender, + BS	  Skin: No rashes, No ecchymoses, No cyanosis	  Neurologic: Non-focal  Extremities: Normal range of motion, No clubbing, cyanosis or edema  Vascular: Peripheral pulses palpable 2+ bilaterally      LABS:	 	    CBC Full  -  ( 2017 06:52 )  WBC Count : 8.01 K/uL  Hemoglobin : 7.8 g/dL  Hematocrit : 26.0 %  Platelet Count - Automated : 130 K/uL  Mean Cell Volume : 83.3 fL  Mean Cell Hemoglobin : 25.0 pg  Mean Cell Hemoglobin Concentration : 30.0 %  Auto Neutrophil # : x  Auto Lymphocyte # : x  Auto Monocyte # : x  Auto Eosinophil # : x  Auto Basophil # : x  Auto Neutrophil % : x  Auto Lymphocyte % : x  Auto Monocyte % : x  Auto Eosinophil % : x  Auto Basophil % : x        138  |  99  |  12  ----------------------------<  107<H>  4.5   |  24  |  0.62  18    137  |  98  |  14  ----------------------------<  105<H>  4.0   |  25  |  0.68    Ca    9.2      2017 06:52  Ca    9.2      2017 23:29  Phos  4.5     06-19  Mg     2.0         TPro  7.1  /  Alb  3.6  /  TBili  0.5  /  DBili  x   /  AST  30  /  ALT  44<H>  /  AlkPhos  99    TPro  7.4  /  Alb  3.7  /  TBili  0.5  /  DBili  x   /  AST  34<H>  /  ALT  50<H>  /  AlkPhos  104  -18      proBNP:   Lipid Profile: Cholesterol, bikrp905 mg/dL<H> [120 - 199]  Direct OAQ779 mg/dL  HDL Cholesterol, serum19 mg/dL<L> [45 - 65]  Triglycerides, ohaia429 mg/dL<H> [10 - 149]    HgA1c: Hemoglobin A1C, Whole Blood: 5.6 % ( @ 06:52)    Patient name: ДМИТРИЙ ALY  YOB: 1980   Age: 36 (F)   MR#: [id number]  Study Date: 10/26/2016  Location: O/PSonographer: Lauren Finkelstein  2nd Sonographer: Alan Martin MD  Study quality: Technically good  Referring Physician: Doron Plaza MD  Blood Pressure: 122/82 mmHg  Height: 150 cm  Weight: 80 kg  BSA: 1.8 m2  ------------------------------------------------------------------------  PROCEDURE: Transesophageal and transthoracic  echocardiograms with 2-D, M-Mode and complete spectral and  color flow Doppler were performed in the echocardiography  laboratory.  Informed consent was first obtained for EVELINA.  The patient was sedated by Anesthesia.  The procedure was  monitored with automatic blood pressure monitoring, ECG  tracings and pulse oximetry.  Gag reflex was abolished with  topical Cetacaine and the transesophageal probe was placed  in the esophagus posterior to the heart without  complications.  The patient tolerated the procedure well.  Real-time and reconstructed 3-dimensional imaging was  performed.  Color Doppler analysis was carried out using  both 2D and 3D mapping.  Patient was injected with 10 cc's of aerosolized saline.  INDICATION: Nonrheumatic mitral (valve) insufficiency  (I34.0)  ------------------------------------------------------------------------  DIMENSIONS:  Dimensions:     Normal Values:  LA:     4.2 cm    2.0 - 4.0 cm  Ao:     2.5 cm    2.0 - 3.8 cm  SEPTUM: 0.8 cm    0.6 - 1.2 cm  PWT:    0.8 cm    0.6 - 1.1 cm  LVIDd:  4.7 cm 3.0 - 5.6 cm  LVIDs:  3.0 cm    1.8 - 4.0 cm  Derived Variables:  LVMI: 70 g/m2  RWT: 0.34  Fractional short: 36 %  Ejection Fraction (Teicholtz): 66 %  ------------------------------------------------------------------------  OBSERVATIONS:  Mitral Valve: Thickened mitral leaflets with mitral valve  prolapse involving the middle (A2) segment of the anterior  mitral leaflet. Severe mitral regurgitation with an  eccentric, posteriorly directed jet.  Vena contracta width  about  0.8 cm.  Systolic reversal of flow seen in the left  superior pulmonary vein.  Estimated regurgitant volume  about  100 mL (by the volumetric method).  Aortic Root: Normal aortic root, aortic arch, and  descending thoracic aorta.  Aortic Valve: Normal trileaflet aortic valve. No aortic  valve regurgitation seen.  Left Atrium: Mildly dilated left atrium.  LA volume index =  37 cc/m2.  No left atrium or left atrial appendage  thrombus.  Left Ventricle: Normal left ventricular systolic function.  No segmental wall motion abnormalities. Normal left  ventricular internal dimensions and wall thicknesses.  Right Heart: Normal right atrium. Normal right ventricular  size and function. Normal tricuspid valve.  Mild tricuspid  regurgitation. Normal pulmonic valve. Minimalpulmonic  regurgitation.  Pericardium/PleuraNormal pericardium with no pericardial  effusion.  Hemodynamic: Inadequate tricuspid regurgitation Doppler  envelope precludes estimation of RVSP. Contrast injection  demonstrates no evidence of a patent foramen ovale.  ------------------------------------------------------------------------  CONCLUSIONS:  1. Thickened mitral leaflets with mitral valve prolapse  involving the middle (A2) segment of the anterior mitral  leaflet. Severe mitral regurgitation with an eccentric,  posteriorly directed jet.  Vena contracta width about  0.8  cm.  Systolic reversal of flow seen in the left superior  pulmonary vein.  Estimated regurgitant volume about  100 mL  (by the volumetric method).  2. Normal trileafletaortic valve. No aortic valve  regurgitation seen.  3. Normal aortic root, aortic arch, and descending thoracic  aorta.  4. Mildly dilated left atrium.  LA volume index = 37 cc/m2.   No left atrium or left atrial appendage thrombus.  5. Normal left ventricular internal dimensions and wall  thicknesses.  6. Normal left ventricular systolic function. No segmental  wall motion abnormalities.  7. Normal right ventricular size and function.  8. Inadequate tricuspid regurgitation Doppler envelope  precludes estimation of RVSP.  9. Contrast injection demonstrates no evidence of a patent  foramen ovale.  ------------------------------------------------------------------------  Confirmed on  10/27/2016 - 07:32:03 by Alan Martin M.D.  ------------------------------------------------------------------------      EXAM:  CT BRAIN        PROCEDURE DATE:  2017     INTERPRETATION:  HISTORY: Fall from standing. Not talking.    Technique: CT of the head was performed without contrast.    Multiple contiguous axial images were acquired from the skullbaseto the   vertex without the administration of intravenous contrast.  Coronal and   sagittal reformations were made.    COMPARISON: CT head dated 2017.    FINDINGS:  The ventricles and sulci are within normal limits. There are no areas of   abnormal attenuation within the brain parenchyma. There is no   intraparenchymal hematoma, mass effect or midline shift. No abnormal   extra-axial fluid collections are present. There is no evidence of acute   transcortical territorial infarction.    The calvarium is intact. The visualized intraorbital compartments,   paranasal sinuses and mastoid complexes appear free of acute disease.    IMPRESSION:  No acute intracranial hemorrhage.  No evidence for acute transcortical territorial infarction.      CHEO OTERO M.D., ATTENDING RADIOLOGIST  This document has been electronically signed. 2017 11:48AM Cardiology/Vascular Medicine Inpatient Consultation Note    Asked by Dr. Davis to see patient re: anticoagulation management in setting of recent MVR.      HISTORY OF PRESENT ILLNESS:  Patient is a 38 yo woman with history of severe MR s/p mechanical MVR at [facility] on 17 with Dr. Ervin Nevarez, She also has a history of HTN and HLD presents to ED with acute dysphagia and aphasia.  Pt was hospitalized until 2017 and was being given Lovenox with a bridge to Coumadin. Pt was apparently discharged with a subtherapeutic INR (as per , INR was less than 2.5) and was given Lovenox injections. She presented with symptoms concerning for acute stroke.  Thus far, she had 2 head CT scans which have been unremarkable for acute stroke.  She cannot have brain MRI because of the presence of a mechanical valve.      Allergies  No Known Allergies    MEDICATIONS:  enoxaparin Injectable 80milliGRAM(s) SubCutaneous every 12 hours  warfarin 7.5milliGRAM(s) Oral once  atorvastatin 40milliGRAM(s) Oral at bedtime        PAST MEDICAL & SURGICAL HISTORY:  MR (mitral regurgitation): S/P mechanical MVR on 17 at [place]   HLD (hyperlipidemia)  HTN (hypertension)  Osteomyelitis of arm: Right arm osteomyelitis, treated in [facility] in   MVP (mitral valve prolapse)  Seizure disorder  Hypercholesterolemia  S/P MVR (mitral valve replacement): Mechanical MVR at [facility] on 17  S/P    delivery delivered      FAMILY HISTORY:  Family history of valvular heart disease (Sibling)  Family history of early CAD (Mother)    SOCIAL HISTORY:    [ ] Non-smoker      REVIEW OF SYSTEMS:  CONSTITUTIONAL: No fever, weight loss, or fatigue  EYES: No eye pain, visual disturbances, or discharge  ENMT:  No difficulty hearing, tinnitus, vertigo; No sinus or throat pain  NECK: No pain or stiffness  RESPIRATORY: No cough, wheezing, chills or hemoptysis; No Shortness of Breath  CARDIOVASCULAR: No chest pain, palpitations, passing out, dizziness, or leg swelling  GASTROINTESTINAL: No abdominal or epigastric pain. No nausea, vomiting, or hematemesis; No diarrhea or constipation. No melena or hematochezia.  GENITOURINARY: No dysuria, frequency, hematuria, or incontinence  NEUROLOGICAL: as above  SKIN: No itching, burning, rashes, or lesions   LYMPH Nodes: No enlarged glands  ENDOCRINE: No heat or cold intolerance; No hair loss  MUSCULOSKELETAL: No joint pain or swelling; No muscle, back, or extremity pain  PSYCHIATRIC: No depression, anxiety, mood swings, or difficulty sleeping  HEME/LYMPH: No easy bruising, or bleeding gums  ALLERY AND IMMUNOLOGIC: No hives or eczema	      PHYSICAL EXAM:  T(C): 37.2, Max: 37.7 (-18 @ 23:09)  HR: 91 (91 - 98)  BP: 110/55 (110/55 - 127/87)  RR: 16 (16 - 30)  SpO2: 98% (94% - 100%)  Wt(kg): --  I&O's Summary      Appearance: Normal	  HEENT:   Normal oral mucosa, PERRL, EOMI	  Lymphatic: No lymphadenopathy  Cardiovascular: Normal S1 S2, No JVD, No murmurs, No edema  Respiratory: Lungs clear to auscultation	  Psychiatry: A & O x 3, Mood & affect appropriate  Gastrointestinal:  Soft, Non-tender, + BS	  Skin: No rashes, No ecchymoses, No cyanosis	  Neurologic: Non-focal  Extremities: Normal range of motion, No clubbing, cyanosis or edema  Vascular: Peripheral pulses palpable 2+ bilaterally      LABS:	 	    CBC Full  -  ( 2017 06:52 )  WBC Count : 8.01 K/uL  Hemoglobin : 7.8 g/dL  Hematocrit : 26.0 %  Platelet Count - Automated : 130 K/uL  Mean Cell Volume : 83.3 fL  Mean Cell Hemoglobin : 25.0 pg  Mean Cell Hemoglobin Concentration : 30.0 %  Auto Neutrophil # : x  Auto Lymphocyte # : x  Auto Monocyte # : x  Auto Eosinophil # : x  Auto Basophil # : x  Auto Neutrophil % : x  Auto Lymphocyte % : x  Auto Monocyte % : x  Auto Eosinophil % : x  Auto Basophil % : x        138  |  99  |  12  ----------------------------<  107<H>  4.5   |  24  |  0.62  18    137  |  98  |  14  ----------------------------<  105<H>  4.0   |  25  |  0.68    Ca    9.2      2017 06:52  Ca    9.2      2017 23:29  Phos  4.5     06-19  Mg     2.0         TPro  7.1  /  Alb  3.6  /  TBili  0.5  /  DBili  x   /  AST  30  /  ALT  44<H>  /  AlkPhos  99    TPro  7.4  /  Alb  3.7  /  TBili  0.5  /  DBili  x   /  AST  34<H>  /  ALT  50<H>  /  AlkPhos  104  -18      proBNP:   Lipid Profile: Cholesterol, speqv656 mg/dL<H> [120 - 199]  Direct VEH717 mg/dL  HDL Cholesterol, serum19 mg/dL<L> [45 - 65]  Triglycerides, rutdu922 mg/dL<H> [10 - 149]    HgA1c: Hemoglobin A1C, Whole Blood: 5.6 % ( @ 06:52)    Patient name: ДМИТИРЙ ALY  YOB: 1980   Age: 36 (F)   MR#: [id number]  Study Date: 10/26/2016  Location: O/PSonographer: Lauren Finkelstein  2nd Sonographer: Alan Martin MD  Study quality: Technically good  Referring Physician: Doron Plaza MD  Blood Pressure: 122/82 mmHg  Height: 150 cm  Weight: 80 kg  BSA: 1.8 m2  ------------------------------------------------------------------------  PROCEDURE: Transesophageal and transthoracic  echocardiograms with 2-D, M-Mode and complete spectral and  color flow Doppler were performed in the echocardiography  laboratory.  Informed consent was first obtained for EVELINA.  The patient was sedated by Anesthesia.  The procedure was  monitored with automatic blood pressure monitoring, ECG  tracings and pulse oximetry.  Gag reflex was abolished with  topical Cetacaine and the transesophageal probe was placed  in the esophagus posterior to the heart without  complications.  The patient tolerated the procedure well.  Real-time and reconstructed 3-dimensional imaging was  performed.  Color Doppler analysis was carried out using  both 2D and 3D mapping.  Patient was injected with 10 cc's of aerosolized saline.  INDICATION: Nonrheumatic mitral (valve) insufficiency  (I34.0)  ------------------------------------------------------------------------  DIMENSIONS:  Dimensions:     Normal Values:  LA:     4.2 cm    2.0 - 4.0 cm  Ao:     2.5 cm    2.0 - 3.8 cm  SEPTUM: 0.8 cm    0.6 - 1.2 cm  PWT:    0.8 cm    0.6 - 1.1 cm  LVIDd:  4.7 cm 3.0 - 5.6 cm  LVIDs:  3.0 cm    1.8 - 4.0 cm  Derived Variables:  LVMI: 70 g/m2  RWT: 0.34  Fractional short: 36 %  Ejection Fraction (Teicholtz): 66 %  ------------------------------------------------------------------------  OBSERVATIONS:  Mitral Valve: Thickened mitral leaflets with mitral valve  prolapse involving the middle (A2) segment of the anterior  mitral leaflet. Severe mitral regurgitation with an  eccentric, posteriorly directed jet.  Vena contracta width  about  0.8 cm.  Systolic reversal of flow seen in the left  superior pulmonary vein.  Estimated regurgitant volume  about  100 mL (by the volumetric method).  Aortic Root: Normal aortic root, aortic arch, and  descending thoracic aorta.  Aortic Valve: Normal trileaflet aortic valve. No aortic  valve regurgitation seen.  Left Atrium: Mildly dilated left atrium.  LA volume index =  37 cc/m2.  No left atrium or left atrial appendage  thrombus.  Left Ventricle: Normal left ventricular systolic function.  No segmental wall motion abnormalities. Normal left  ventricular internal dimensions and wall thicknesses.  Right Heart: Normal right atrium. Normal right ventricular  size and function. Normal tricuspid valve.  Mild tricuspid  regurgitation. Normal pulmonic valve. Minimalpulmonic  regurgitation.  Pericardium/PleuraNormal pericardium with no pericardial  effusion.  Hemodynamic: Inadequate tricuspid regurgitation Doppler  envelope precludes estimation of RVSP. Contrast injection  demonstrates no evidence of a patent foramen ovale.  ------------------------------------------------------------------------  CONCLUSIONS:  1. Thickened mitral leaflets with mitral valve prolapse  involving the middle (A2) segment of the anterior mitral  leaflet. Severe mitral regurgitation with an eccentric,  posteriorly directed jet.  Vena contracta width about  0.8  cm.  Systolic reversal of flow seen in the left superior  pulmonary vein.  Estimated regurgitant volume about  100 mL  (by the volumetric method).  2. Normal trileafletaortic valve. No aortic valve  regurgitation seen.  3. Normal aortic root, aortic arch, and descending thoracic  aorta.  4. Mildly dilated left atrium.  LA volume index = 37 cc/m2.   No left atrium or left atrial appendage thrombus.  5. Normal left ventricular internal dimensions and wall  thicknesses.  6. Normal left ventricular systolic function. No segmental  wall motion abnormalities.  7. Normal right ventricular size and function.  8. Inadequate tricuspid regurgitation Doppler envelope  precludes estimation of RVSP.  9. Contrast injection demonstrates no evidence of a patent  foramen ovale.  ------------------------------------------------------------------------  Confirmed on  10/27/2016 - 07:32:03 by Alan Martin M.D.  ------------------------------------------------------------------------      EXAM:  CT BRAIN        PROCEDURE DATE:  2017     INTERPRETATION:  HISTORY: Fall from standing. Not talking.    Technique: CT of the head was performed without contrast.    Multiple contiguous axial images were acquired from the skullbaseto the   vertex without the administration of intravenous contrast.  Coronal and   sagittal reformations were made.    COMPARISON: CT head dated 2017.    FINDINGS:  The ventricles and sulci are within normal limits. There are no areas of   abnormal attenuation within the brain parenchyma. There is no   intraparenchymal hematoma, mass effect or midline shift. No abnormal   extra-axial fluid collections are present. There is no evidence of acute   transcortical territorial infarction.    The calvarium is intact. The visualized intraorbital compartments,   paranasal sinuses and mastoid complexes appear free of acute disease.    IMPRESSION:  No acute intracranial hemorrhage.  No evidence for acute transcortical territorial infarction.      CHEO OTERO M.D., ATTENDING RADIOLOGIST  This document has been electronically signed. 2017 11:48AM

## 2017-06-19 NOTE — H&P ADULT - NEGATIVE NEUROLOGICAL SYMPTOMS
no hemiparesis/no vertigo/no loss of sensation/no focal seizures/no transient paralysis/no generalized seizures/no difficulty walking/no paresthesias/no confusion/no facial palsy/no headache

## 2017-06-20 LAB
APTT BLD: 69.9 SEC — HIGH (ref 27.5–37.4)
APTT BLD: 78.4 SEC — HIGH (ref 27.5–37.4)
BUN SERPL-MCNC: 11 MG/DL — SIGNIFICANT CHANGE UP (ref 7–23)
CALCIUM SERPL-MCNC: 9.6 MG/DL — SIGNIFICANT CHANGE UP (ref 8.4–10.5)
CHLORIDE SERPL-SCNC: 99 MMOL/L — SIGNIFICANT CHANGE UP (ref 98–107)
CO2 SERPL-SCNC: 27 MMOL/L — SIGNIFICANT CHANGE UP (ref 22–31)
CREAT SERPL-MCNC: 0.65 MG/DL — SIGNIFICANT CHANGE UP (ref 0.5–1.3)
FERRITIN SERPL-MCNC: 194 NG/ML — HIGH (ref 15–150)
FOLATE SERPL-MCNC: > 20 NG/ML — HIGH (ref 4.7–20)
GLUCOSE SERPL-MCNC: 110 MG/DL — HIGH (ref 70–99)
HCT VFR BLD CALC: 27.7 % — LOW (ref 34.5–45)
HGB BLD-MCNC: 8.2 G/DL — LOW (ref 11.5–15.5)
INR BLD: 1.11 — SIGNIFICANT CHANGE UP (ref 0.88–1.17)
INR BLD: 1.15 — SIGNIFICANT CHANGE UP (ref 0.88–1.17)
IRON SATN MFR SERPL: 292 UG/DL — SIGNIFICANT CHANGE UP (ref 140–530)
IRON SATN MFR SERPL: 43 UG/DL — SIGNIFICANT CHANGE UP (ref 30–160)
LDH SERPL L TO P-CCNC: 391 U/L — HIGH (ref 135–225)
MAGNESIUM SERPL-MCNC: 1.9 MG/DL — SIGNIFICANT CHANGE UP (ref 1.6–2.6)
MCHC RBC-ENTMCNC: 24.7 PG — LOW (ref 27–34)
MCHC RBC-ENTMCNC: 29.6 % — LOW (ref 32–36)
MCV RBC AUTO: 83.4 FL — SIGNIFICANT CHANGE UP (ref 80–100)
PLATELET # BLD AUTO: 132 K/UL — LOW (ref 150–400)
PMV BLD: 10.1 FL — SIGNIFICANT CHANGE UP (ref 7–13)
POTASSIUM SERPL-MCNC: 4.1 MMOL/L — SIGNIFICANT CHANGE UP (ref 3.5–5.3)
POTASSIUM SERPL-SCNC: 4.1 MMOL/L — SIGNIFICANT CHANGE UP (ref 3.5–5.3)
PROTHROM AB SERPL-ACNC: 12.5 SEC — SIGNIFICANT CHANGE UP (ref 9.8–13.1)
PROTHROM AB SERPL-ACNC: 12.9 SEC — SIGNIFICANT CHANGE UP (ref 9.8–13.1)
RBC # BLD: 3.32 M/UL — LOW (ref 3.8–5.2)
RBC # FLD: 14.6 % — HIGH (ref 10.3–14.5)
RETICS #: 185.2 10X3/UL — HIGH (ref 17–73)
RETICS/RBC NFR: 5.7 % — HIGH (ref 0.5–2.5)
SODIUM SERPL-SCNC: 138 MMOL/L — SIGNIFICANT CHANGE UP (ref 135–145)
UIBC SERPL-MCNC: 249 UG/DL — SIGNIFICANT CHANGE UP (ref 110–370)
VIT B12 SERPL-MCNC: 951 PG/ML — HIGH (ref 200–900)
WBC # BLD: 7.52 K/UL — SIGNIFICANT CHANGE UP (ref 3.8–10.5)
WBC # FLD AUTO: 7.52 K/UL — SIGNIFICANT CHANGE UP (ref 3.8–10.5)

## 2017-06-20 PROCEDURE — 99222 1ST HOSP IP/OBS MODERATE 55: CPT | Mod: GC

## 2017-06-20 PROCEDURE — 99232 SBSQ HOSP IP/OBS MODERATE 35: CPT

## 2017-06-20 PROCEDURE — 99233 SBSQ HOSP IP/OBS HIGH 50: CPT

## 2017-06-20 RX ORDER — FERROUS SULFATE 325(65) MG
325 TABLET ORAL DAILY
Qty: 0 | Refills: 0 | Status: DISCONTINUED | OUTPATIENT
Start: 2017-06-20 | End: 2017-06-30

## 2017-06-20 RX ORDER — WARFARIN SODIUM 2.5 MG/1
7.5 TABLET ORAL ONCE
Qty: 0 | Refills: 0 | Status: COMPLETED | OUTPATIENT
Start: 2017-06-20 | End: 2017-06-20

## 2017-06-20 RX ADMIN — HEPARIN SODIUM 1600 UNIT(S)/HR: 5000 INJECTION INTRAVENOUS; SUBCUTANEOUS at 05:56

## 2017-06-20 RX ADMIN — HEPARIN SODIUM 1600 UNIT(S)/HR: 5000 INJECTION INTRAVENOUS; SUBCUTANEOUS at 12:57

## 2017-06-20 RX ADMIN — ATORVASTATIN CALCIUM 40 MILLIGRAM(S): 80 TABLET, FILM COATED ORAL at 22:07

## 2017-06-20 RX ADMIN — WARFARIN SODIUM 7.5 MILLIGRAM(S): 2.5 TABLET ORAL at 17:43

## 2017-06-20 RX ADMIN — Medication 325 MILLIGRAM(S): at 17:43

## 2017-06-20 NOTE — PROGRESS NOTE ADULT - PROBLEM SELECTOR PLAN 1
likely embolic stroke, symptoms seem to be resolving  given poor health literacy and possible CVA on lovenox c/w heparin gtt  daily INR, comadin 7.5 today--INR goal 2.5-3.5  Called OP CT surgeon at [place] to update him re: case, states valve is MRI compatible if MRI need however reports post-op many patient have + MRI if sx improving no real indication for MRI, reports INR goal 2.5-3.5 likely embolic stroke, symptoms seem to be resolving  given poor health literacy and possible CVA on lovenox c/w heparin gtt  daily INR, comadin 7.5 today--INR goal 2.5-3.5  Called OP CT surgeon at [place] to update him re: case, states valve is MRI compatible if MRI need however reports post-op many patient have + MRI if sx improving no real indication for MRI, reports INR goal 2.5-3.5  - TTE to assess for thrombosis on valve per cards recs history not very c/w stroke however if was embolic stroke would be most likely given recent intervention, symptoms seem to be resolving, pt without aphasia today  given poor health literacy and possible CVA on lovenox c/w heparin gtt  daily INR, comadin 7.5 today--INR goal 2.5-3.5  Called OP CT surgeon at [place] to update him re: case, states valve is MRI compatible if MRI need however reports post-op many patient have + MRI if sx improving no real indication for MRI, reports INR goal 2.5-3.5  - TTE to assess for thrombosis on valve per cards recs

## 2017-06-20 NOTE — PROGRESS NOTE ADULT - ASSESSMENT
36 y/o female with a PMHx of severe MR S/P MVR at [facility] on 6/7 and discharged on 6/14 (discharged with a subtherapeutic INR on a Lovenox to Coumadin bridge but wasn't educated on Coumadin), HTN, and HLD presents to ED with aphasia concerning for CVA.

## 2017-06-20 NOTE — PROGRESS NOTE ADULT - SUBJECTIVE AND OBJECTIVE BOX
Cardiology/Vascular Medicine Inpatient Progress Note    Asked by Dr. Davis to see patient re: anticoagulation management in setting of recent MVR.    MEDICATIONS  (STANDING):  atorvastatin 40milliGRAM(s) Oral at bedtime  heparin  Infusion. Unit(s)/Hr IV Continuous <Continuous>    MEDICATIONS  (PRN):  heparin  Injectable 6500Unit(s) IV Push every 6 hours PRN For aPTT less than 40  heparin  Injectable 3000Unit(s) IV Push every 6 hours PRN For aPTT between 40 - 57  acetaminophen   Tablet. 650milliGRAM(s) Oral every 6 hours PRN Mild Pain (1 - 3)      Vital Signs Last 24 Hrs  T(C): 36.8, Max: 37.2 (06-19 @ 13:44)  T(F): 98.2, Max: 99 (06-19 @ 13:44)  HR: 93 (84 - 95)  BP: 110/72 (104/71 - 115/59)  BP(mean): --  RR: 19 (16 - 19)  SpO2: 100% (98% - 100%)    Appearance: Normal	  HEENT:   Normal oral mucosa, PERRL, EOMI	  Lymphatic: No lymphadenopathy  Cardiovascular: Normal S1 S2, No JVD, No murmurs, No edema  Respiratory: Lungs clear to auscultation	  Psychiatry: A & O x 3, Mood & affect appropriate  Gastrointestinal:  Soft, Non-tender, + BS	  Skin: No rashes, No ecchymoses, No cyanosis	  Neurologic: Non-focal  Extremities: Normal range of motion, No clubbing, cyanosis or edema  Vascular: Peripheral pulses palpable 2+ bilaterally      LABS:	 	                          8.2    7.52  )-----------( 132      ( 20 Jun 2017 04:30 )             27.7     06-20    138  |  99  |  11  ----------------------------<  110<H>  4.1   |  27  |  0.65    Ca    9.6      20 Jun 2017 04:30  Phos  4.5     06-19  Mg     1.9     06-20    TPro  7.1  /  Alb  3.6  /  TBili  0.5  /  DBili  x   /  AST  30  /  ALT  44<H>  /  AlkPhos  99  06-19        Patient name: ДМИТРИЙ ALY  YOB: 1980   Age: 36 (F)   MR#: [id number]  Study Date: 10/26/2016  Location: O/PSonographer: Lauren Finkelstein  2nd Sonographer: Alan Martin MD  Study quality: Technically good  Referring Physician: Doron Plaza MD  Blood Pressure: 122/82 mmHg  Height: 150 cm  Weight: 80 kg  BSA: 1.8 m2  ------------------------------------------------------------------------  PROCEDURE: Transesophageal and transthoracic  echocardiograms with 2-D, M-Mode and complete spectral and  color flow Doppler were performed in the echocardiography  laboratory.  Informed consent was first obtained for EVELINA.  The patient was sedated by Anesthesia.  The procedure was  monitored with automatic blood pressure monitoring, ECG  tracings and pulse oximetry.  Gag reflex was abolished with  topical Cetacaine and the transesophageal probe was placed  in the esophagus posterior to the heart without  complications.  The patient tolerated the procedure well.  Real-time and reconstructed 3-dimensional imaging was  performed.  Color Doppler analysis was carried out using  both 2D and 3D mapping.  Patient was injected with 10 cc's of aerosolized saline.  INDICATION: Nonrheumatic mitral (valve) insufficiency  (I34.0)  ------------------------------------------------------------------------  DIMENSIONS:  Dimensions:     Normal Values:  LA:     4.2 cm    2.0 - 4.0 cm  Ao:     2.5 cm    2.0 - 3.8 cm  SEPTUM: 0.8 cm    0.6 - 1.2 cm  PWT:    0.8 cm    0.6 - 1.1 cm  LVIDd:  4.7 cm 3.0 - 5.6 cm  LVIDs:  3.0 cm    1.8 - 4.0 cm  Derived Variables:  LVMI: 70 g/m2  RWT: 0.34  Fractional short: 36 %  Ejection Fraction (Teicholtz): 66 %  ------------------------------------------------------------------------  OBSERVATIONS:  Mitral Valve: Thickened mitral leaflets with mitral valve  prolapse involving the middle (A2) segment of the anterior  mitral leaflet. Severe mitral regurgitation with an  eccentric, posteriorly directed jet.  Vena contracta width  about  0.8 cm.  Systolic reversal of flow seen in the left  superior pulmonary vein.  Estimated regurgitant volume  about  100 mL (by the volumetric method).  Aortic Root: Normal aortic root, aortic arch, and  descending thoracic aorta.  Aortic Valve: Normal trileaflet aortic valve. No aortic  valve regurgitation seen.  Left Atrium: Mildly dilated left atrium.  LA volume index =  37 cc/m2.  No left atrium or left atrial appendage  thrombus.  Left Ventricle: Normal left ventricular systolic function.  No segmental wall motion abnormalities. Normal left  ventricular internal dimensions and wall thicknesses.  Right Heart: Normal right atrium. Normal right ventricular  size and function. Normal tricuspid valve.  Mild tricuspid  regurgitation. Normal pulmonic valve. Minimalpulmonic  regurgitation.  Pericardium/PleuraNormal pericardium with no pericardial  effusion.  Hemodynamic: Inadequate tricuspid regurgitation Doppler  envelope precludes estimation of RVSP. Contrast injection  demonstrates no evidence of a patent foramen ovale.  ------------------------------------------------------------------------  CONCLUSIONS:  1. Thickened mitral leaflets with mitral valve prolapse  involving the middle (A2) segment of the anterior mitral  leaflet. Severe mitral regurgitation with an eccentric,  posteriorly directed jet.  Vena contracta width about  0.8  cm.  Systolic reversal of flow seen in the left superior  pulmonary vein.  Estimated regurgitant volume about  100 mL  (by the volumetric method).  2. Normal trileafletaortic valve. No aortic valve  regurgitation seen.  3. Normal aortic root, aortic arch, and descending thoracic  aorta.  4. Mildly dilated left atrium.  LA volume index = 37 cc/m2.   No left atrium or left atrial appendage thrombus.  5. Normal left ventricular internal dimensions and wall  thicknesses.  6. Normal left ventricular systolic function. No segmental  wall motion abnormalities.  7. Normal right ventricular size and function.  8. Inadequate tricuspid regurgitation Doppler envelope  precludes estimation of RVSP.  9. Contrast injection demonstrates no evidence of a patent  foramen ovale.  ------------------------------------------------------------------------  Confirmed on  10/27/2016 - 07:32:03 by Alan Martin M.D.  ------------------------------------------------------------------------      EXAM:  CT BRAIN        PROCEDURE DATE:  Jun 19 2017     INTERPRETATION:  HISTORY: Fall from standing. Not talking.    Technique: CT of the head was performed without contrast.    Multiple contiguous axial images were acquired from the skullbaseto the   vertex without the administration of intravenous contrast.  Coronal and   sagittal reformations were made.    COMPARISON: CT head dated 6/18/2017.    FINDINGS:  The ventricles and sulci are within normal limits. There are no areas of   abnormal attenuation within the brain parenchyma. There is no   intraparenchymal hematoma, mass effect or midline shift. No abnormal   extra-axial fluid collections are present. There is no evidence of acute   transcortical territorial infarction.    The calvarium is intact. The visualized intraorbital compartments,   paranasal sinuses and mastoid complexes appear free of acute disease.    IMPRESSION:  No acute intracranial hemorrhage.  No evidence for acute transcortical territorial infarction.      CHEO OTERO M.D., ATTENDING RADIOLOGIST  This document has been electronically signed. Jun 19 2017 11:48AM Cardiology/Vascular Medicine Inpatient Progress Note    Asked by Dr. Davis to see patient re: anticoagulation management in setting of recent MVR.    Current INR still subtherapeutic:  PT/INR - ( 20 Jun 2017 11:50 )   PT: 12.9 SEC;   INR: 1.15     PTT - ( 20 Jun 2017 11:50 )  PTT:69.9 SEC    MEDICATIONS  (STANDING):  atorvastatin 40milliGRAM(s) Oral at bedtime  heparin  Infusion. Unit(s)/Hr IV Continuous <Continuous>  ferrous    sulfate 325milliGRAM(s) Oral daily    MEDICATIONS  (PRN):  heparin  Injectable 6500Unit(s) IV Push every 6 hours PRN For aPTT less than 40  heparin  Injectable 3000Unit(s) IV Push every 6 hours PRN For aPTT between 40 - 57  acetaminophen   Tablet. 650milliGRAM(s) Oral every 6 hours PRN Mild Pain (1 - 3)      Vital Signs Last 24 Hrs  T(C): 36.8, Max: 37.2 (06-19 @ 13:44)  T(F): 98.2, Max: 99 (06-19 @ 13:44)  HR: 93 (84 - 95)  BP: 110/72 (104/71 - 115/59)  BP(mean): --  RR: 19 (16 - 19)  SpO2: 100% (98% - 100%)    Appearance: Normal	  HEENT:   Normal oral mucosa, PERRL, EOMI	  Lymphatic: No lymphadenopathy  Cardiovascular: Normal S1 S2, No JVD, No murmurs, No edema  Respiratory: Lungs clear to auscultation	  Psychiatry: A & O x 3, Mood & affect appropriate  Gastrointestinal:  Soft, Non-tender, + BS	  Skin: No rashes, No ecchymoses, No cyanosis	  Neurologic: Non-focal  Extremities: Normal range of motion, No clubbing, cyanosis or edema  Vascular: Peripheral pulses palpable 2+ bilaterally      LABS:	 	                          8.2    7.52  )-----------( 132      ( 20 Jun 2017 04:30 )             27.7     06-20    138  |  99  |  11  ----------------------------<  110<H>  4.1   |  27  |  0.65    Ca    9.6      20 Jun 2017 04:30  Phos  4.5     06-19  Mg     1.9     06-20    TPro  7.1  /  Alb  3.6  /  TBili  0.5  /  DBili  x   /  AST  30  /  ALT  44<H>  /  AlkPhos  99  06-19        Patient name: ДМИТРИЙ ALY  YOB: 1980   Age: 36 (F)   MR#: [id number]  Study Date: 10/26/2016  Location: O/PSonographer: Lauren Finkelstein  [employer] Sonographer: Alan Martin MD  Study quality: Technically good  Referring Physician: Doron Plaza MD  Blood Pressure: 122/82 mmHg  Height: 150 cm  Weight: 80 kg  BSA: 1.8 m2  ------------------------------------------------------------------------  PROCEDURE: Transesophageal and transthoracic  echocardiograms with 2-D, M-Mode and complete spectral and  color flow Doppler were performed in the echocardiography  laboratory.  Informed consent was first obtained for EVELINA.  The patient was sedated by Anesthesia.  The procedure was  monitored with automatic blood pressure monitoring, ECG  tracings and pulse oximetry.  Gag reflex was abolished with  topical Cetacaine and the transesophageal probe was placed  in the esophagus posterior to the heart without  complications.  The patient tolerated the procedure well.  Real-time and reconstructed 3-dimensional imaging was  performed.  Color Doppler analysis was carried out using  both 2D and 3D mapping.  Patient was injected with 10 cc's of aerosolized saline.  INDICATION: Nonrheumatic mitral (valve) insufficiency  (I34.0)  ------------------------------------------------------------------------  DIMENSIONS:  Dimensions:     Normal Values:  LA:     4.2 cm    2.0 - 4.0 cm  Ao:     2.5 cm    2.0 - 3.8 cm  SEPTUM: 0.8 cm    0.6 - 1.2 cm  PWT:    0.8 cm    0.6 - 1.1 cm  LVIDd:  4.7 cm 3.0 - 5.6 cm  LVIDs:  3.0 cm    1.8 - 4.0 cm  Derived Variables:  LVMI: 70 g/m2  RWT: 0.34  Fractional short: 36 %  Ejection Fraction (Teicholtz): 66 %  ------------------------------------------------------------------------  OBSERVATIONS:  Mitral Valve: Thickened mitral leaflets with mitral valve  prolapse involving the middle (A2) segment of the anterior  mitral leaflet. Severe mitral regurgitation with an  eccentric, posteriorly directed jet.  Vena contracta width  about  0.8 cm.  Systolic reversal of flow seen in the left  superior pulmonary vein.  Estimated regurgitant volume  about  100 mL (by the volumetric method).  Aortic Root: Normal aortic root, aortic arch, and  descending thoracic aorta.  Aortic Valve: Normal trileaflet aortic valve. No aortic  valve regurgitation seen.  Left Atrium: Mildly dilated left atrium.  LA volume index =  37 cc/m2.  No left atrium or left atrial appendage  thrombus.  Left Ventricle: Normal left ventricular systolic function.  No segmental wall motion abnormalities. Normal left  ventricular internal dimensions and wall thicknesses.  Right Heart: Normal right atrium. Normal right ventricular  size and function. Normal tricuspid valve.  Mild tricuspid  regurgitation. Normal pulmonic valve. Minimalpulmonic  regurgitation.  Pericardium/PleuraNormal pericardium with no pericardial  effusion.  Hemodynamic: Inadequate tricuspid regurgitation Doppler  envelope precludes estimation of RVSP. Contrast injection  demonstrates no evidence of a patent foramen ovale.  ------------------------------------------------------------------------  CONCLUSIONS:  1. Thickened mitral leaflets with mitral valve prolapse  involving the middle (A2) segment of the anterior mitral  leaflet. Severe mitral regurgitation with an eccentric,  posteriorly directed jet.  Vena contracta width about  0.8  cm.  Systolic reversal of flow seen in the left superior  pulmonary vein.  Estimated regurgitant volume about  100 mL  (by the volumetric method).  2. Normal trileafletaortic valve. No aortic valve  regurgitation seen.  3. Normal aortic root, aortic arch, and descending thoracic  aorta.  4. Mildly dilated left atrium.  LA volume index = 37 cc/m2.   No left atrium or left atrial appendage thrombus.  5. Normal left ventricular internal dimensions and wall  thicknesses.  6. Normal left ventricular systolic function. No segmental  wall motion abnormalities.  7. Normal right ventricular size and function.  8. Inadequate tricuspid regurgitation Doppler envelope  precludes estimation of RVSP.  9. Contrast injection demonstrates no evidence of a patent  foramen ovale.  ------------------------------------------------------------------------  Confirmed on  10/27/2016 - 07:32:03 by Alan Martin M.D.  ------------------------------------------------------------------------      EXAM:  CT BRAIN        PROCEDURE DATE:  Jun 19 2017     INTERPRETATION:  HISTORY: Fall from standing. Not talking.    Technique: CT of the head was performed without contrast.    Multiple contiguous axial images were acquired from the skullbaseto the   vertex without the administration of intravenous contrast.  Coronal and   sagittal reformations were made.    COMPARISON: CT head dated 6/18/2017.    FINDINGS:  The ventricles and sulci are within normal limits. There are no areas of   abnormal attenuation within the brain parenchyma. There is no   intraparenchymal hematoma, mass effect or midline shift. No abnormal   extra-axial fluid collections are present. There is no evidence of acute   transcortical territorial infarction.    The calvarium is intact. The visualized intraorbital compartments,   paranasal sinuses and mastoid complexes appear free of acute disease.    IMPRESSION:  No acute intracranial hemorrhage.  No evidence for acute transcortical territorial infarction.      CHEO OTERO M.D., ATTENDING RADIOLOGIST  This document has been electronically signed. Jun 19 2017 11:48AM

## 2017-06-20 NOTE — PROGRESS NOTE ADULT - SUBJECTIVE AND OBJECTIVE BOX
Patient is a 38 yo  Female who presents with a chief complaint of Aphasia      SUBJECTIVE / OVERNIGHT EVENTS:    Patient reports speech is now at baseline.  She reports there was a misunderstanding with coumadin/lovenox bridge, her  thought that the coumadin should start when they ran out of lovenox.      MEDICATIONS  (STANDING):  atorvastatin 40milliGRAM(s) Oral at bedtime  heparin  Infusion. Unit(s)/Hr IV Continuous <Continuous>  warfarin 7.5milliGRAM(s) Oral once    MEDICATIONS  (PRN):  heparin  Injectable 6500Unit(s) IV Push every 6 hours PRN For aPTT less than 40  heparin  Injectable 3000Unit(s) IV Push every 6 hours PRN For aPTT between 40 - 57  acetaminophen   Tablet. 650milliGRAM(s) Oral every 6 hours PRN Mild Pain (1 - 3)      Vital Signs Last 24 Hrs  T(C): 36.8, Max: 37.2 (06-19 @ 13:44)  HR: 93 (84 - 93)  BP: 110/72 (104/71 - 111/54)  RR: 19 (16 - 19)  SpO2: 100% (98% - 100%)  Wt(kg): --      PHYSICAL EXAM:  GENERAL: NAD, well-developed  HEAD:  Atraumatic, Normocephalic  EYES: EOMI, PERRLA, conjunctiva and sclera clear  NECK: Supple, No JVD  CHEST/LUNG: Clear to auscultation bilaterally; No wheeze  HEART: Regular rate and rhythm; No murmurs, rubs, or gallops  ABDOMEN: Soft, Nontender, Nondistended; Bowel sounds present  EXTREMITIES:   warm and well perfused, No clubbing, cyanosis, or edema  PSYCH: AAOx3  NEUROLOGY: non-focal  SKIN: No rashes or lesions    LABS:                        8.2    7.52  )-----------( 132      ( 20 Jun 2017 04:30 )             27.7     06-20    138  |  99  |  11  ----------------------------<  110<H>  4.1   |  27  |  0.65    Ca    9.6      20 Jun 2017 04:30  Phos  4.5     06-19  Mg     1.9     06-20    TPro  7.1  /  Alb  3.6  /  TBili  0.5  /  DBili  x   /  AST  30  /  ALT  44<H>  /  AlkPhos  99  06-19    PT/INR - ( 20 Jun 2017 04:30 )   PT: 12.5 SEC;   INR: 1.11          PTT - ( 20 Jun 2017 04:30 )  PTT:78.4 SEC  CARDIAC MARKERS ( 19 Jun 2017 06:52 )  x     / < 0.06 ng/mL / 142 u/L / 1.11 ng/mL / x      CARDIAC MARKERS ( 18 Jun 2017 23:29 )  x     / < 0.06 ng/mL / 157 u/L / 1.34 ng/mL / x              Microbiology:   G 06-18 @ 23:29  pH: 7.39/pCO2: 45/pO2: 25/HCO3: 25/lactate: 0.8      RADIOLOGY & ADDITIONAL TESTS:    Imaging Personally Reviewed:    CTH x 2 w/o obvious infarct, no bleeding    Consultant(s) Notes Reviewed:  neurology, cardiology     Care Discussed with Consultants/Other Providers:

## 2017-06-20 NOTE — CONSULT NOTE ADULT - PROBLEM SELECTOR RECOMMENDATION 9
Aphasia resolved  No functional deficits at this time   Rec PT at home to increase endurance after MVR  Neuro f/u
CTA H/N now and MRI w/o con in CDU overnight.   psych consult
H/o recent MVR.  Recommend heparin gtt-> warfarin.  TTE to assess MV (to see if the valve is thrombosed).

## 2017-06-20 NOTE — PROGRESS NOTE ADULT - ASSESSMENT
Probably acute embolic stroke in setting of recent MVR.  She was supposed to be on Lovenox with bridge to warfarin, but apparently was only taking Lovenox.  Will recommend that she start heparin gtt with bridge to warfarin with INR goal 2.5 to 3.5. Probably acute embolic stroke in setting of recent MVR.  She was supposed to be on Lovenox with bridge to warfarin, but apparently was only taking Lovenox.  Will recommend that she start heparin gtt with bridge to warfarin with INR goal 2.5 to 3.5 which she is receiving now.

## 2017-06-20 NOTE — CONSULT NOTE ADULT - SUBJECTIVE AND OBJECTIVE BOX
Patient is a 37y old  Female who presents with a chief complaint of Aphasia (2017 08:17)      HPI:  36 y/o female with a PMHx of severe MR S/P mechanical MVR at [facility] on 17,Pt was hospitalized until 2017 and was being given Lovenox with a bridge to Coumadin. Pt was apparently discharged with a subtherapeutic INR (as per , INR was less than 2.5) and was given Lovenox injections buit had not taken Coumadin. The day prior to admit, patient's  was playing with their 4 year old toddler and he feel off his father's shoulders. When this happened, pt became very scared, startled and started shaking uncontrollably. Pt subsequently lost consciousness while sitting on the sofa. Pt did not have any head or bodily trauma. Pt was unconsciousness for over 5 minutes until EMS came. EMS was able to shake her and wake her up, but since then she has been completely aphasic and expresses generalized weakness. AS per , in ED aphasia is improving but she is not back at her baseline functionality. CT head revealed no acute intracranial hemorrhage, mass effect, or shift.   CT angio head/neck: Patent intracranial and cervical vasculature without major vessel occlusion, stenosis, aneurysm or dissection. MRI P.      REVIEW OF SYSTEMS: No chest pain, shortness of breath, nausea, vomiting or diarhea.      PAST MEDICAL & SURGICAL HISTORY  MR (mitral regurgitation)  HLD (hyperlipidemia)  HTN (hypertension)  Osteomyelitis of arm  MVP (mitral valve prolapse)  Seizure disorder  Hypercholesterolemia  S/P MVR (mitral valve replacement)  S/P    delivery delivered      SOCIAL HISTORY  Smoking - Denied, EtOH - Denied, Drugs - Denied    FUNCTIONAL HISTORY:   Lives   Independent    CURRENT FUNCTIONAL STATUS:      FAMILY HISTORY   Family history of valvular heart disease (Sibling)  Family history of early CAD (Mother)      RECENT LABS/IMAGING  CBC Full  -  ( 2017 04:30 )  WBC Count : 7.52 K/uL  Hemoglobin : 8.2 g/dL  Hematocrit : 27.7 %  Platelet Count - Automated : 132 K/uL  Mean Cell Volume : 83.4 fL  Mean Cell Hemoglobin : 24.7 pg  Mean Cell Hemoglobin Concentration : 29.6 %  Auto Neutrophil # : x  Auto Lymphocyte # : x  Auto Monocyte # : x  Auto Eosinophil # : x  Auto Basophil # : x  Auto Neutrophil % : x  Auto Lymphocyte % : x  Auto Monocyte % : x  Auto Eosinophil % : x  Auto Basophil % : x    06-    138  |  99  |  11  ----------------------------<  110<H>  4.1   |  27  |  0.65    Ca    9.6      2017 04:30  Phos  4.5       Mg     1.9         TPro  7.1  /  Alb  3.6  /  TBili  0.5  /  DBili  x   /  AST  30  /  ALT  44<H>  /  AlkPhos  99          VITALS  T(C): 36.7, Max: 36.9 ( @ 20:00)  HR: 89 (84 - 93)  BP: 115/68 (104/71 - 115/68)  RR: 17 (16 - 19)  SpO2: 100% (99% - 100%)  Wt(kg): --    ALLERGIES  No Known Allergies      MEDICATIONS   atorvastatin 40milliGRAM(s) Oral at bedtime  heparin  Infusion. Unit(s)/Hr IV Continuous <Continuous>  heparin  Injectable 6500Unit(s) IV Push every 6 hours PRN  heparin  Injectable 3000Unit(s) IV Push every 6 hours PRN  acetaminophen   Tablet. 650milliGRAM(s) Oral every 6 hours PRN  warfarin 7.5milliGRAM(s) Oral once      ----------------------------------------------------------------------------------------  PHYSICAL EXAM  Constitutional - NAD, Comfortable  HEENT - NCAT, EOMI  Neck - Supple, No limited ROM  Chest - CTA bilaterally, No wheeze, No rhonchi, No crackles  Cardiovascular - RRR, S1S2, No murmurs  Abdomen - BS+, Soft, NTND  Extremities - No C/C/E, No calf tenderness   Neurologic Exam -                    Cognitive - Awake, Alert, AAO to self, place, date, year, situation     Communication - Fluent, No dysarthria, no aphasia     Cranial Nerves - CN 2-12 intact     Motor - No focal deficits                       Sensory - Intact to LT     Reflexes - DTR Intact, No primitive reflexive     Balance - WNL Static  Psychiatric - Mood stable, Affect WNL Patient is a 37y old  Female who presents with a chief complaint of Aphasia (2017 08:17)      HPI:  36 y/o female with a PMHx of severe MR S/P mechanical MVR at [facility] on 17,Pt was hospitalized until 2017 and was being given Lovenox with a bridge to Coumadin. Pt was apparently discharged with a subtherapeutic INR (as per , INR was less than 2.5) and was given Lovenox injections buit had not taken Coumadin. The day prior to admit, patient's  was playing with their 4 year old toddler and he feel off his father's shoulders. When this happened, pt became very scared, startled and started shaking uncontrollably. Pt subsequently lost consciousness while sitting on the sofa. Pt did not have any head or bodily trauma. Pt was unconsciousness for over 5 minutes until EMS came. EMS was able to shake her and wake her up, but since then she has been completely aphasic and expresses generalized weakness. AS per , in ED aphasia is improving but she is not back at her baseline functionality. CT head revealed no acute intracranial hemorrhage, mass effect, or shift.   CT angio head/neck: Patent intracranial and cervical vasculature without major vessel occlusion, stenosis, aneurysm or dissection. MRI P. As per pt, , pt is back to baseline. HAs some right shoulder pain after MVR. Complains of overall fatigue.       REVIEW OF SYSTEMS: No chest pain, shortness of breath, nausea, vomiting or diarhea.      PAST MEDICAL & SURGICAL HISTORY  MR (mitral regurgitation)  HLD (hyperlipidemia)  HTN (hypertension)  Osteomyelitis of arm  MVP (mitral valve prolapse)  Seizure disorder  Hypercholesterolemia  S/P MVR (mitral valve replacement)  S/P    delivery delivered      SOCIAL HISTORY  Smoking - Denied, EtOH - Denied, Drugs - Denied    FUNCTIONAL HISTORY:   Lives with spouse  Independent PTA    CURRENT FUNCTIONAL STATUS: supervision       FAMILY HISTORY   Family history of valvular heart disease (Sibling)  Family history of early CAD (Mother)      RECENT LABS/IMAGING  CBC Full  -  ( 2017 04:30 )  WBC Count : 7.52 K/uL  Hemoglobin : 8.2 g/dL  Hematocrit : 27.7 %  Platelet Count - Automated : 132 K/uL  Mean Cell Volume : 83.4 fL  Mean Cell Hemoglobin : 24.7 pg  Mean Cell Hemoglobin Concentration : 29.6 %  Auto Neutrophil # : x  Auto Lymphocyte # : x  Auto Monocyte # : x  Auto Eosinophil # : x  Auto Basophil # : x  Auto Neutrophil % : x  Auto Lymphocyte % : x  Auto Monocyte % : x  Auto Eosinophil % : x  Auto Basophil % : x        138  |  99  |  11  ----------------------------<  110<H>  4.1   |  27  |  0.65    Ca    9.6      2017 04:30  Phos  4.5       Mg     1.9         TPro  7.1  /  Alb  3.6  /  TBili  0.5  /  DBili  x   /  AST  30  /  ALT  44<H>  /  AlkPhos  99          VITALS  T(C): 36.7, Max: 36.9 ( @ 20:00)  HR: 89 (84 - 93)  BP: 115/68 (104/71 - 115/68)  RR: 17 (16 - 19)  SpO2: 100% (99% - 100%)  Wt(kg): --    ALLERGIES  No Known Allergies      MEDICATIONS   atorvastatin 40milliGRAM(s) Oral at bedtime  heparin  Infusion. Unit(s)/Hr IV Continuous <Continuous>  heparin  Injectable 6500Unit(s) IV Push every 6 hours PRN  heparin  Injectable 3000Unit(s) IV Push every 6 hours PRN  acetaminophen   Tablet. 650milliGRAM(s) Oral every 6 hours PRN  warfarin 7.5milliGRAM(s) Oral once      ----------------------------------------------------------------------------------------  PHYSICAL EXAM  Constitutional - NAD, Comfortable  HEENT - NCAT, EOMI  Neck - Supple, No limited ROM  Chest - CTA bilaterally, No wheeze, No rhonchi, No crackles  Cardiovascular - RRR, S1S2, No murmurs  Abdomen - BS+, Soft, NTND  Extremities - No C/C/E, No calf tenderness   Neurologic Exam -                    Cognitive - Awake, Alert, AAO to self, place, date, year, situation     Communication - Fluent, No dysarthria, no aphasia     Cranial Nerves - CN 2-12 intact     Motor - No focal deficits                       Sensory - Intact to LT     Reflexes - DTR Intact, No primitive reflexive     Balance - WNL Static  Psychiatric - affect flat

## 2017-06-21 DIAGNOSIS — D69.6 THROMBOCYTOPENIA, UNSPECIFIED: ICD-10-CM

## 2017-06-21 LAB
ANISOCYTOSIS BLD QL: SLIGHT — SIGNIFICANT CHANGE UP
APTT BLD: 128 SEC — CRITICAL HIGH (ref 27.5–37.4)
APTT BLD: 59.8 SEC — HIGH (ref 27.5–37.4)
APTT BLD: 62.7 SEC — HIGH (ref 27.5–37.4)
APTT BLD: 65.6 SEC — HIGH (ref 27.5–37.4)
BUN SERPL-MCNC: 14 MG/DL — SIGNIFICANT CHANGE UP (ref 7–23)
CALCIUM SERPL-MCNC: 9.6 MG/DL — SIGNIFICANT CHANGE UP (ref 8.4–10.5)
CHLORIDE SERPL-SCNC: 102 MMOL/L — SIGNIFICANT CHANGE UP (ref 98–107)
CLOSURE TME COLL+EPINEP BLD: 37 K/UL — LOW (ref 150–400)
CO2 SERPL-SCNC: 21 MMOL/L — LOW (ref 22–31)
CREAT SERPL-MCNC: 0.65 MG/DL — SIGNIFICANT CHANGE UP (ref 0.5–1.3)
GLUCOSE SERPL-MCNC: 115 MG/DL — HIGH (ref 70–99)
HAPTOGLOB SERPL-MCNC: 22 MG/DL — LOW (ref 34–200)
HCT VFR BLD CALC: 27.5 % — LOW (ref 34.5–45)
HGB BLD-MCNC: 8.6 G/DL — LOW (ref 11.5–15.5)
INR BLD: 1.17 — SIGNIFICANT CHANGE UP (ref 0.88–1.17)
LDH SERPL L TO P-CCNC: 417 U/L — HIGH (ref 135–225)
MAGNESIUM SERPL-MCNC: 1.8 MG/DL — SIGNIFICANT CHANGE UP (ref 1.6–2.6)
MANUAL SMEAR VERIFICATION: SIGNIFICANT CHANGE UP
MCHC RBC-ENTMCNC: 25.5 PG — LOW (ref 27–34)
MCHC RBC-ENTMCNC: 31.3 % — LOW (ref 32–36)
MCV RBC AUTO: 81.6 FL — SIGNIFICANT CHANGE UP (ref 80–100)
MICROCYTES BLD QL: SLIGHT — SIGNIFICANT CHANGE UP
PLATELET # BLD AUTO: 58 K/UL — LOW (ref 150–400)
PMV BLD: 10.1 FL — SIGNIFICANT CHANGE UP (ref 7–13)
POTASSIUM SERPL-MCNC: 3.9 MMOL/L — SIGNIFICANT CHANGE UP (ref 3.5–5.3)
POTASSIUM SERPL-SCNC: 3.9 MMOL/L — SIGNIFICANT CHANGE UP (ref 3.5–5.3)
PROTHROM AB SERPL-ACNC: 13.2 SEC — HIGH (ref 9.8–13.1)
RBC # BLD: 3.37 M/UL — LOW (ref 3.8–5.2)
RBC # FLD: 14.5 % — SIGNIFICANT CHANGE UP (ref 10.3–14.5)
SODIUM SERPL-SCNC: 138 MMOL/L — SIGNIFICANT CHANGE UP (ref 135–145)
WBC # BLD: 6.89 K/UL — SIGNIFICANT CHANGE UP (ref 3.8–10.5)
WBC # FLD AUTO: 6.89 K/UL — SIGNIFICANT CHANGE UP (ref 3.8–10.5)

## 2017-06-21 PROCEDURE — 99222 1ST HOSP IP/OBS MODERATE 55: CPT | Mod: GC

## 2017-06-21 PROCEDURE — 99233 SBSQ HOSP IP/OBS HIGH 50: CPT

## 2017-06-21 RX ORDER — WARFARIN SODIUM 2.5 MG/1
10 TABLET ORAL ONCE
Qty: 0 | Refills: 0 | Status: DISCONTINUED | OUTPATIENT
Start: 2017-06-21 | End: 2017-06-21

## 2017-06-21 RX ORDER — ARGATROBAN 50 MG/50ML
2 INJECTION, SOLUTION INTRAVENOUS
Qty: 250 | Refills: 0 | Status: DISCONTINUED | OUTPATIENT
Start: 2017-06-21 | End: 2017-06-30

## 2017-06-21 RX ADMIN — ATORVASTATIN CALCIUM 40 MILLIGRAM(S): 80 TABLET, FILM COATED ORAL at 23:18

## 2017-06-21 RX ADMIN — ARGATROBAN 9.58 MICROGRAM(S)/KG/MIN: 50 INJECTION, SOLUTION INTRAVENOUS at 11:16

## 2017-06-21 RX ADMIN — HEPARIN SODIUM 1400 UNIT(S)/HR: 5000 INJECTION INTRAVENOUS; SUBCUTANEOUS at 07:50

## 2017-06-21 RX ADMIN — Medication 325 MILLIGRAM(S): at 11:17

## 2017-06-21 NOTE — MEDICAL STUDENT ADULT H&P (EDUCATION) - NS MD HP STUD PE CARDIO FT
RRR, S1/S2 with mechanical valve auscultated. Possible II/VI holosystolic murmur. No rubs or gallops

## 2017-06-21 NOTE — MEDICAL STUDENT ADULT H&P (EDUCATION) - NS MD HP STUD HX OF PRESENT ILLNESS FT
36 y/o F w/ PMH of severe MR s/p mechanical MVR at [facility] on 6/7/17 (Dr Geri Mueller), HTN, and HLD p/w aphasia x 1 day. Pt had severe MR and was hospitalized for replacement on 6/7/17 until 6/14/17 when she was d/c home with subtherapeutic INR and instructions to take Lovenox with bridge to Coumadin. There was misunderstanding and she did not take Coumadin. On evening of 6/18, pt saw her child fall while playing with her  and the patient started experiencing some anxiety which progressed to her shaking uncontrollably and then losing consciousness on the sofa for >5 minutes. EMS was able to wake her up by shaking her when they arrived, and she did not have any clear trauma however she was completely aphasic and had generalized weakness. Patient is now back to baseline functionality. ACS has been ruled out, and CT head showed no acute hemorrhage, mass effect, or shift. Today's CBC revealed H/H of 8.6/27.5 and Platelets of 58 (down from 132 on previous day) at which point hematology was consulted. Patient doesn't recall ever being told she was anemic or thrombocytopenic. She has not noticed any gingival bleeding, epistaxis, or new skin lesions, however she has seen new BRBPR for the last two days. Patient denies recent weight loss, shortness of breath, palpitations, fever, chills, recent trauma, diarrhea, melena, or hematuria.

## 2017-06-21 NOTE — PROGRESS NOTE ADULT - PROBLEM SELECTOR PLAN 4
likely 2/2 post op status also hemolysis 2/2 valve  can start iron  transfuse <7  likely mild hemorrhoidal bleeding, monitor, bowel regimen

## 2017-06-21 NOTE — MEDICAL STUDENT ADULT H&P (EDUCATION) - NS MD HP STUD PMH FT
HLD (hyperlipidemia)    HTN (hypertension)    MR (mitral regurgitation)  S/P mechanical MVR on 6/7/17 at [facility]  MVP (mitral valve prolapse)    Osteomyelitis of arm  Right arm osteomyelitis, treated in [facility] in 1998.

## 2017-06-21 NOTE — MEDICAL STUDENT ADULT H&P (EDUCATION) - NS MD HP STUD MEDICATIONS FT
· 	Aspirin Enteric Coated 81 mg oral delayed release tablet: 1 tab(s) orally once a day  · 	simvastatin 40 mg oral tablet: 1 tab(s) orally once a day (at bedtime)  · 	Lasix 20 mg oral tablet: 1 tab(s) orally once a day, Last Dose Taken:    · 	Vitamin D2 50,000 intl units (1.25 mg) oral capsule: 1 cap(s) orally once a week, Last Dose Taken:    · 	gabapentin 100 mg oral capsule: 1 cap(s) orally 3 times a day, Last Dose Taken:    · 	pantoprazole 40 mg oral delayed release tablet: 1 tab(s) orally once a day, Last Dose Taken:    · 	Coumadin 5 mg oral tablet: 1 tab(s) orally once a day, Last Dose Taken:

## 2017-06-21 NOTE — MEDICAL STUDENT ADULT H&P (EDUCATION) - NS MD HP STUD RESULTS RAD FT
CT Head w/o contrast: No acute intracranial hemorrhage, mass or shift.  CT Angio Neck w/ IV Cont: intracranial and cervical vasculature without major vessel occlusion, stenosis, aneurysm or dissect.

## 2017-06-21 NOTE — MEDICAL STUDENT ADULT H&P (EDUCATION) - NS MD HP STUD ASPLAN PLAN FT
1. Thrombocytopenia. Patients platelet count dropped >50% since yesterday, now at 58. 4T's score of 5 (>50% decrease in PLT, <1 d w/ Heparin w/i last 30 days, possible other cause) makes HIT plausible diagnosis. Also may be 2/2 shear stress from new mechanical valve.   Check CBC daily, monitor PLT. Transfuse if <10.   Send HIT antibody test and Serotonin-Release Assay.  Obtain blood smear to evaluate for presence of schistocytes.   2. Anemia. Patients Hgb stable at 8.6. Patient denies ever being told she was anemic before. Haptoglobin low at 22 c/w intrinsic hemolysis possibly 2/2 mechanical valve. 1. Thrombocytopenia. Patients platelet count dropped >50% since yesterday, now at 58. 4T's score of 5 (>50% decrease in PLT, <1 d w/ Heparin w/i last 30 days, possible other cause) makes HIT plausible diagnosis. Also may be 2/2 shear stress from new mechanical valve. ITP is dx of exclusion.  Check CBC daily, monitor PLT. Transfuse if <10.   Send HIT antibody test and Serotonin-Release Assay.  Obtain blood smear to evaluate for presence of schistocytes.   2. Anemia. Patients H/H stable at 8.6/27.5 with normocytic anemia. Patient denies ever being told she was anemic before. Haptoglobin low at 22 c/w intrinsic hemolysis possibly 2/2 mechanical valve. Plan same as above.

## 2017-06-21 NOTE — MEDICAL STUDENT ADULT H&P (EDUCATION) - NS MD HP STUD FAM HX FT
Mother  Still living? Yes, Estimated age: Age Unknown  Family history of early CAD, Age at diagnosis: Age Unknown     Sibling  Still living? Yes, Estimated age: Age Unknown  Family history of valvular heart disease, Age at diagnosis: Age Unknown.

## 2017-06-21 NOTE — MEDICAL STUDENT ADULT H&P (EDUCATION) - NS MD HP STUD RESULTS LAB FT
CE x2: Negative  CT head: No acute intracranial hemorrhage, mass effect, or shift.  H/H: 8.6/27.5  Platelets: 58  PT: 13.2, , INR 1.17  Haptoglobin 22    Ferritin 194

## 2017-06-21 NOTE — PROGRESS NOTE ADULT - ASSESSMENT
38 y/o female with a PMHx of severe MR S/P MVR at [facility] on 6/7 and discharged on 6/14 (discharged with a subtherapeutic INR on a Lovenox to Coumadin bridge but wasn't educated/didnt udjudytand), HTN, and HLD presents to ED with aphasia concerning for CVA.

## 2017-06-21 NOTE — SWALLOW BEDSIDE ASSESSMENT ADULT - SWALLOW EVAL: DIAGNOSIS
Patient presents with functional oral and pharyngeal stage swallowing mechanism characterized by adequate oral containment, adequate chewing for solid, adequate bolus manipulation and transport. There is laryngeal elevation upon palpation, initiation of the pharyngeal swallow. There were no overt signs of impaired airway protection.

## 2017-06-21 NOTE — PROGRESS NOTE ADULT - PROBLEM SELECTOR PLAN 1
100s to 58 now; concern for possible HIT  -heme c/s  -HIT screen  -argatroban, hold coumadin until heme eval 100s to 58 now; concern for possible HIT  -heme c/s  -HIT screen, blue top plt, ldh/hapto  -argatroban, hold coumadin until heme eval

## 2017-06-21 NOTE — MEDICAL STUDENT ADULT H&P (EDUCATION) - NS MD HP STUD SUPERVISOR COMMENTS FT
Agree with MS3 Melania' H&P as above. Briefly, pt is a 38yo woman with recent mechanical MVR (where she was undoubtedly exposed to heparinoids) here with near syncopal event, found to have progressive thrombocytopenia, concerning for HIT. 4T score 5, intermediate risk for HIT. Agree with PF-4 Ab testing, changing heparin gtt to argatroban. If HIT confirmed (will need NADINE if PF-4 is positive), will need full-dose anticoagulation with argatroban until platelets greater than 150, then transitioned back to coumadin.     Smear reviewed: No schistocytes, minimal concern for overt hemolysis. Platelet count confirmed at appx 40k.     Would continue to monitor for alternative causes of thrombocytopenia (infection, other medications, etc). Will continue to follow.

## 2017-06-21 NOTE — PROGRESS NOTE ADULT - PROBLEM SELECTOR PLAN 1
Heparin stopped and argatroban gtt started in setting of platelet count drop.  Continue with anticoagulation with warfarin for INR goal 2.5 to 3.5 if patient is stable without evidence of bleeding in setting of thrombocytopenia.    Hematology evaluation.  Review blood smear to see if platelets have been clumping.

## 2017-06-21 NOTE — PROVIDER CONTACT NOTE (OTHER) - SITUATION
Marcelino Virgen from Hematology called and told me that the morning aPTT for this patient was 128 seconds.

## 2017-06-21 NOTE — PROGRESS NOTE ADULT - ASSESSMENT
Probably acute embolic stroke in setting of recent MVR.  She was supposed to be on Lovenox with bridge to warfarin, but apparently was only taking Lovenox.  Will recommend that she start heparin gtt with bridge to warfarin with INR goal 2.5 to 3.5 which she is receiving now.

## 2017-06-21 NOTE — SWALLOW BEDSIDE ASSESSMENT ADULT - COMMENTS
Patient is a 38 y/o female with a PMHx of severe MR S/P MVR at [facility] on 6/7 and discharged on 6/14 (discharged with a subtherapeutic INR on a Lovenox to Coumadin bridge but wasn't educated on Coumadin), HTN, and HLD presents to ED with aphasia, R/O CVA.

## 2017-06-21 NOTE — MEDICAL STUDENT ADULT H&P (EDUCATION) - NS MD HP STUD ASPLAN ASSES FT
38 y/o female with a PMH of severe MR s/p MVR at [facility] on 6/7 (discharged on 6/14 with subtherapeutic INR with instructions for Lovenox to Coumadin bridge but not properly educated), HTN, and HLD presented with aphasia, now found to have new onset anemia and thrombocytopenia 2/2 mechanical valve vs HIT. 38 y/o female with a PMH of severe MR s/p MVR at [facility] on 6/7 (discharged on 6/14 with subtherapeutic INR with instructions for Lovenox to Coumadin bridge but not properly educated), HTN, and HLD presented with aphasia, now found to have new onset anemia and thrombocytopenia 2/2 mechanical valve vs HIT vs ITP.

## 2017-06-21 NOTE — PROGRESS NOTE ADULT - SUBJECTIVE AND OBJECTIVE BOX
Patient is a 37y old  Female who presents with a chief complaint of Aphasia      SUBJECTIVE / OVERNIGHT EVENTS:    No overnight events.  Denies new weakness, no aphasia, ambulating to the bathroom.  States was constipated for 2-3 days yesterday had a bowel movement and felt there was some mild BRB on stool.  Has not recurred.  No chest pain or SOB.     MEDICATIONS  (STANDING):  atorvastatin 40milliGRAM(s) Oral at bedtime  ferrous    sulfate 325milliGRAM(s) Oral daily  argatroban Infusion 2MICROgram(s)/kG/Min IV Continuous <Continuous>    MEDICATIONS  (PRN):  acetaminophen   Tablet. 650milliGRAM(s) Oral every 6 hours PRN Mild Pain (1 - 3)      Vital Signs Last 24 Hrs  T(C): 36.8, Max: 37 (06-20 @ 21:04)  HR: 76 (76 - 94)  BP: 116/65 (115/68 - 120/69)  RR: 17 (17 - 17)  SpO2: 100% (100% - 100%)  Wt(kg): --    PHYSICAL EXAM:  GENERAL: NAD, well-developed  HEAD:  Atraumatic, Normocephalic  EYES: EOMI, PERRLA, conjunctiva and sclera clear  NECK: Supple, No JVD  CHEST/LUNG: Clear to auscultation bilaterally; No wheeze  HEART: Regular, +click  ABDOMEN: Soft, Nontender, Nondistended; Bowel sounds present  EXTREMITIES:   warm and well perfused, No clubbing, cyanosis, or edema  PSYCH: AAOx3  NEUROLOGY: non-focal  SKIN: No rashes or lesions    LABS:                        8.6    6.89  )-----------( 58       ( 21 Jun 2017 06:45 )             27.5     06-21    138  |  102  |  14  ----------------------------<  115<H>  3.9   |  21<L>  |  0.65    Ca    9.6      21 Jun 2017 06:45  Mg     1.8     06-21      PT/INR - ( 21 Jun 2017 06:45 )   PT: 13.2 SEC;   INR: 1.17          PTT - ( 21 Jun 2017 06:45 )  PTT:128.0 SEC      Consultant(s) Notes Reviewed:   cards     Care Discussed with Consultants/Other Providers: heme

## 2017-06-21 NOTE — MEDICAL STUDENT ADULT H&P (EDUCATION) - NS MD HP STUD SOCIAL HX FT
Pt is  and lives with her  and 4 year old child. She is a former [employer] employee but has not been working since her hospitalization for MVR. Denies tobacco, alcohol or illicit drug use.

## 2017-06-21 NOTE — PROGRESS NOTE ADULT - SUBJECTIVE AND OBJECTIVE BOX
Cardiology/Vascular Medicine Inpatient Progress Note    Asked by Dr. Davis to see patient re: anticoagulation management in setting of recent MVR.    Current INR still subtherapeutic:  PT/INR - ( 21 Jun 2017 06:45 )   PT: 13.2 SEC;   INR: 1.17     PTT - ( 21 Jun 2017 18:00 )  PTT:59.8 SEC    Platelet count decreased.  Will send off HIT Ab.  Argatroban gtt started.    MEDICATIONS  (STANDING):  atorvastatin 40milliGRAM(s) Oral at bedtime  ferrous    sulfate 325milliGRAM(s) Oral daily  argatroban Infusion 2MICROgram(s)/kG/Min IV Continuous <Continuous>    MEDICATIONS  (PRN):  acetaminophen   Tablet. 650milliGRAM(s) Oral every 6 hours PRN Mild Pain (1 - 3)    Vital Signs Last 24 Hrs  T(C): 36.6, Max: 36.8 (06-21 @ 05:02)  T(F): 97.9, Max: 98.2 (06-21 @ 05:02)  HR: 94 (76 - 94)  BP: 128/72 (116/65 - 128/72)  BP(mean): --  RR: 18 (17 - 18)  SpO2: 100% (100% - 100%)      Appearance: Normal	  HEENT:   Normal oral mucosa, PERRL, EOMI	  Lymphatic: No lymphadenopathy  Cardiovascular: Normal S1 S2, No JVD, No murmurs, +crisp click, No edema  Respiratory: Lungs clear to auscultation	  Psychiatry: A & O x 3, Mood & affect appropriate  Gastrointestinal:  Soft, Non-tender, + BS	  Skin: No rashes, No ecchymoses, No cyanosis	  Neurologic: Non-focal  Extremities: Normal range of motion, No clubbing, cyanosis or edema  Vascular: Peripheral pulses palpable 2+ bilaterally      LABS:	 	                              8.6    6.89  )-----------( 58       ( 21 Jun 2017 06:45 )             27.5     06-21    138  |  102  |  14  ----------------------------<  115<H>  3.9   |  21<L>  |  0.65    Ca    9.6      21 Jun 2017 06:45  Mg     1.8     06-21      PT/INR - ( 21 Jun 2017 06:45 )   PT: 13.2 SEC;   INR: 1.17     PTT - ( 21 Jun 2017 18:00 )  PTT:59.8 SEC      Patient name: ДМИТРИЙ ALY  YOB: 1980   Age: 36 (F)   MR#: [id number]  Study Date: 10/26/2016  Location: O/PSonographer: Lauren Finkelstein  2nd Sonographer: Alan Martin MD  Study quality: Technically good  Referring Physician: Doron Plaza MD  Blood Pressure: 122/82 mmHg  Height: 150 cm  Weight: 80 kg  BSA: 1.8 m2  ------------------------------------------------------------------------  PROCEDURE: Transesophageal and transthoracic  echocardiograms with 2-D, M-Mode and complete spectral and  color flow Doppler were performed in the echocardiography  laboratory.  Informed consent was first obtained for EVELINA.  The patient was sedated by Anesthesia.  The procedure was  monitored with automatic blood pressure monitoring, ECG  tracings and pulse oximetry.  Gag reflex was abolished with  topical Cetacaine and the transesophageal probe was placed  in the esophagus posterior to the heart without  complications.  The patient tolerated the procedure well.  Real-time and reconstructed 3-dimensional imaging was  performed.  Color Doppler analysis was carried out using  both 2D and 3D mapping.  Patient was injected with 10 cc's of aerosolized saline.  INDICATION: Nonrheumatic mitral (valve) insufficiency  (I34.0)  ------------------------------------------------------------------------  DIMENSIONS:  Dimensions:     Normal Values:  LA:     4.2 cm    2.0 - 4.0 cm  Ao:     2.5 cm    2.0 - 3.8 cm  SEPTUM: 0.8 cm    0.6 - 1.2 cm  PWT:    0.8 cm    0.6 - 1.1 cm  LVIDd:  4.7 cm 3.0 - 5.6 cm  LVIDs:  3.0 cm    1.8 - 4.0 cm  Derived Variables:  LVMI: 70 g/m2  RWT: 0.34  Fractional short: 36 %  Ejection Fraction (Teicholtz): 66 %  ------------------------------------------------------------------------  OBSERVATIONS:  Mitral Valve: Thickened mitral leaflets with mitral valve  prolapse involving the middle (A2) segment of the anterior  mitral leaflet. Severe mitral regurgitation with an  eccentric, posteriorly directed jet.  Vena contracta width  about  0.8 cm.  Systolic reversal of flow seen in the left  superior pulmonary vein.  Estimated regurgitant volume  about  100 mL (by the volumetric method).  Aortic Root: Normal aortic root, aortic arch, and  descending thoracic aorta.  Aortic Valve: Normal trileaflet aortic valve. No aortic  valve regurgitation seen.  Left Atrium: Mildly dilated left atrium.  LA volume index =  37 cc/m2.  No left atrium or left atrial appendage  thrombus.  Left Ventricle: Normal left ventricular systolic function.  No segmental wall motion abnormalities. Normal left  ventricular internal dimensions and wall thicknesses.  Right Heart: Normal right atrium. Normal right ventricular  size and function. Normal tricuspid valve.  Mild tricuspid  regurgitation. Normal pulmonic valve. Minimalpulmonic  regurgitation.  Pericardium/PleuraNormal pericardium with no pericardial  effusion.  Hemodynamic: Inadequate tricuspid regurgitation Doppler  envelope precludes estimation of RVSP. Contrast injection  demonstrates no evidence of a patent foramen ovale.  ------------------------------------------------------------------------  CONCLUSIONS:  1. Thickened mitral leaflets with mitral valve prolapse  involving the middle (A2) segment of the anterior mitral  leaflet. Severe mitral regurgitation with an eccentric,  posteriorly directed jet.  Vena contracta width about  0.8  cm.  Systolic reversal of flow seen in the left superior  pulmonary vein.  Estimated regurgitant volume about  100 mL  (by the volumetric method).  2. Normal trileafletaortic valve. No aortic valve  regurgitation seen.  3. Normal aortic root, aortic arch, and descending thoracic  aorta.  4. Mildly dilated left atrium.  LA volume index = 37 cc/m2.   No left atrium or left atrial appendage thrombus.  5. Normal left ventricular internal dimensions and wall  thicknesses.  6. Normal left ventricular systolic function. No segmental  wall motion abnormalities.  7. Normal right ventricular size and function.  8. Inadequate tricuspid regurgitation Doppler envelope  precludes estimation of RVSP.  9. Contrast injection demonstrates no evidence of a patent  foramen ovale.  ------------------------------------------------------------------------  Confirmed on  10/27/2016 - 07:32:03 by Alan Martin M.D.  ------------------------------------------------------------------------      EXAM:  CT BRAIN        PROCEDURE DATE:  Jun 19 2017     INTERPRETATION:  HISTORY: Fall from standing. Not talking.    Technique: CT of the head was performed without contrast.    Multiple contiguous axial images were acquired from the skullbaseto the   vertex without the administration of intravenous contrast.  Coronal and   sagittal reformations were made.    COMPARISON: CT head dated 6/18/2017.    FINDINGS:  The ventricles and sulci are within normal limits. There are no areas of   abnormal attenuation within the brain parenchyma. There is no   intraparenchymal hematoma, mass effect or midline shift. No abnormal   extra-axial fluid collections are present. There is no evidence of acute   transcortical territorial infarction.    The calvarium is intact. The visualized intraorbital compartments,   paranasal sinuses and mastoid complexes appear free of acute disease.    IMPRESSION:  No acute intracranial hemorrhage.  No evidence for acute transcortical territorial infarction.      CHEO OTERO M.D., ATTENDING RADIOLOGIST  This document has been electronically signed. Jun 19 2017 11:48AM

## 2017-06-22 LAB
APTT BLD: 64.3 SEC — HIGH (ref 27.5–37.4)
APTT BLD: 66 SEC — HIGH (ref 27.5–37.4)
BUN SERPL-MCNC: 17 MG/DL — SIGNIFICANT CHANGE UP (ref 7–23)
CALCIUM SERPL-MCNC: 9.6 MG/DL — SIGNIFICANT CHANGE UP (ref 8.4–10.5)
CHLORIDE SERPL-SCNC: 103 MMOL/L — SIGNIFICANT CHANGE UP (ref 98–107)
CO2 SERPL-SCNC: 19 MMOL/L — LOW (ref 22–31)
CREAT SERPL-MCNC: 0.68 MG/DL — SIGNIFICANT CHANGE UP (ref 0.5–1.3)
GLUCOSE SERPL-MCNC: 106 MG/DL — HIGH (ref 70–99)
HBV CORE AB SER-ACNC: NONREACTIVE — SIGNIFICANT CHANGE UP
HBV SURFACE AB SER-ACNC: NONREACTIVE — SIGNIFICANT CHANGE UP
HBV SURFACE AG SER-ACNC: NEGATIVE — SIGNIFICANT CHANGE UP
HCT VFR BLD CALC: 28.3 % — LOW (ref 34.5–45)
HCV AB S/CO SERPL IA: 0.18 S/CO — SIGNIFICANT CHANGE UP
HCV AB SERPL-IMP: SIGNIFICANT CHANGE UP
HEPARIN CF II AG PPP-ACNC: 1.88 — HIGH (ref 0–0.39)
HGB BLD-MCNC: 8.7 G/DL — LOW (ref 11.5–15.5)
HIV1 AG SER QL: SIGNIFICANT CHANGE UP
HIV1+2 AB SPEC QL: SIGNIFICANT CHANGE UP
INR BLD: 1.51 — HIGH (ref 0.88–1.17)
INR BLD: 1.59 — HIGH (ref 0.88–1.17)
MAGNESIUM SERPL-MCNC: 1.9 MG/DL — SIGNIFICANT CHANGE UP (ref 1.6–2.6)
MCHC RBC-ENTMCNC: 25.3 PG — LOW (ref 27–34)
MCHC RBC-ENTMCNC: 30.7 % — LOW (ref 32–36)
MCV RBC AUTO: 82.3 FL — SIGNIFICANT CHANGE UP (ref 80–100)
PLATELET # BLD AUTO: 112 K/UL — LOW (ref 150–400)
PLATELET # BLD AUTO: 97 K/UL — LOW (ref 150–400)
PMV BLD: 11.4 FL — SIGNIFICANT CHANGE UP (ref 7–13)
POTASSIUM SERPL-MCNC: 3.9 MMOL/L — SIGNIFICANT CHANGE UP (ref 3.5–5.3)
POTASSIUM SERPL-SCNC: 3.9 MMOL/L — SIGNIFICANT CHANGE UP (ref 3.5–5.3)
PROTHROM AB SERPL-ACNC: 17.1 SEC — HIGH (ref 9.8–13.1)
PROTHROM AB SERPL-ACNC: 18 SEC — HIGH (ref 9.8–13.1)
RBC # BLD: 3.44 M/UL — LOW (ref 3.8–5.2)
RBC # FLD: 14.9 % — HIGH (ref 10.3–14.5)
SODIUM SERPL-SCNC: 139 MMOL/L — SIGNIFICANT CHANGE UP (ref 135–145)
WBC # BLD: 7.66 K/UL — SIGNIFICANT CHANGE UP (ref 3.8–10.5)
WBC # FLD AUTO: 7.66 K/UL — SIGNIFICANT CHANGE UP (ref 3.8–10.5)

## 2017-06-22 PROCEDURE — 99233 SBSQ HOSP IP/OBS HIGH 50: CPT

## 2017-06-22 PROCEDURE — 93306 TTE W/DOPPLER COMPLETE: CPT | Mod: 26

## 2017-06-22 PROCEDURE — 93970 EXTREMITY STUDY: CPT | Mod: 26

## 2017-06-22 RX ORDER — MUPIROCIN 20 MG/G
1 OINTMENT TOPICAL
Qty: 0 | Refills: 0 | Status: DISCONTINUED | OUTPATIENT
Start: 2017-06-22 | End: 2017-06-23

## 2017-06-22 RX ORDER — DOCUSATE SODIUM 100 MG
100 CAPSULE ORAL THREE TIMES A DAY
Qty: 0 | Refills: 0 | Status: DISCONTINUED | OUTPATIENT
Start: 2017-06-22 | End: 2017-06-30

## 2017-06-22 RX ORDER — SENNA PLUS 8.6 MG/1
2 TABLET ORAL AT BEDTIME
Qty: 0 | Refills: 0 | Status: DISCONTINUED | OUTPATIENT
Start: 2017-06-22 | End: 2017-06-27

## 2017-06-22 RX ORDER — WARFARIN SODIUM 2.5 MG/1
10 TABLET ORAL ONCE
Qty: 0 | Refills: 0 | Status: COMPLETED | OUTPATIENT
Start: 2017-06-22 | End: 2017-06-22

## 2017-06-22 RX ADMIN — ATORVASTATIN CALCIUM 40 MILLIGRAM(S): 80 TABLET, FILM COATED ORAL at 21:16

## 2017-06-22 RX ADMIN — ARGATROBAN 9.58 MICROGRAM(S)/KG/MIN: 50 INJECTION, SOLUTION INTRAVENOUS at 08:11

## 2017-06-22 RX ADMIN — Medication 325 MILLIGRAM(S): at 15:23

## 2017-06-22 RX ADMIN — Medication 100 MILLIGRAM(S): at 21:16

## 2017-06-22 RX ADMIN — WARFARIN SODIUM 10 MILLIGRAM(S): 2.5 TABLET ORAL at 18:27

## 2017-06-22 NOTE — CHART NOTE - NSCHARTNOTEFT_GEN_A_CORE
38 y/o female s/p mechanical MVR at [facility] on 6/7. Pt has not had any f/u since leaving hospital. Nurse states pt having pain to groin area bilaterally. Pt also complains of pain to right side of chest where wound w/ sutures noted. Right chest mid axillary line crusting noted approximately 2cm with one suture.  Pt states was to be removed today. Central midsternal one stitch noted. Right groin induration and pain to touch. Wound edges intact, left groin area there is granulation tissue noted w/ dissolvable sutures present. No warmth noted, however pain to touch. Suture to mid axillary area removed; pt states suture was due to be removed today. Epigastric area one suture removed. Dr Zimmer called for possible wound consult for wound dehiscence to left groin and R mid axillary area. Awaiting wound consult to be called by Dr Zimmer. Pt did not have elevated WBC or fever. Pt have steristrips to right axilla; appear soiled. Removed. No active bleeding noted. Will monitor

## 2017-06-22 NOTE — PROGRESS NOTE ADULT - SUBJECTIVE AND OBJECTIVE BOX
Cardiology/Vascular Medicine Inpatient Progress Note    Asked by Dr. Davis to see patient re: anticoagulation management in setting of recent MVR.    Current INR still subtherapeutic:  PT/INR - ( 22 Jun 2017 06:49 )   PT: 17.1 SEC;   INR: 1.51     PTT - ( 22 Jun 2017 06:49 )  PTT:64.3 SEC                          8.7    7.66  )-----------( 97       ( 22 Jun 2017 06:49 )             28.3     Platelet count increased.    Platelet count decreased.  Will send off HIT Ab.  Argatroban gtt started.      Vital Signs Last 24 Hrs  T(C): 36.8, Max: 36.8 (06-22 @ 05:31)  T(F): 98.3, Max: 98.3 (06-22 @ 05:31)  HR: 98 (88 - 98)  BP: 112/67 (112/67 - 128/72)  BP(mean): --  RR: 17 (17 - 18)  SpO2: 99% (99% - 100%)      Appearance: Normal	  HEENT:   Normal oral mucosa, PERRL, EOMI	  Lymphatic: No lymphadenopathy  Cardiovascular: Normal S1 S2, No JVD, No murmurs, +crisp click, No edema  Respiratory: Lungs clear to auscultation	  Psychiatry: A & O x 3, Mood & affect appropriate  Gastrointestinal:  Soft, Non-tender, + BS	  Skin: No rashes, No ecchymoses, No cyanosis	  Neurologic: Non-focal  Extremities: Normal range of motion, No clubbing, cyanosis or edema  Vascular: Peripheral pulses palpable 2+ bilaterally    MEDICATIONS  (STANDING):  atorvastatin 40milliGRAM(s) Oral at bedtime  ferrous    sulfate 325milliGRAM(s) Oral daily  argatroban Infusion 2MICROgram(s)/kG/Min IV Continuous <Continuous>    MEDICATIONS  (PRN):  acetaminophen   Tablet. 650milliGRAM(s) Oral every 6 hours PRN Mild Pain (1 - 3)        LABS:	 	                            8.7    7.66  )-----------( 97       ( 22 Jun 2017 06:49 )             28.3     06-22    139  |  103  |  17  ----------------------------<  106<H>  3.9   |  19<L>  |  0.68    Ca    9.6      22 Jun 2017 06:49  Mg     1.9     06-22        Patient name: ДМИТРИЙ AYL  YOB: 1980   Age: 36 (F)   MR#: [id number]  Study Date: 10/26/2016  Location: O/PSonographer: Lauren Finkelstein  2nd Sonographer: Alan Martin MD  Study quality: Technically good  Referring Physician: Doron Plaza MD  Blood Pressure: 122/82 mmHg  Height: 150 cm  Weight: 80 kg  BSA: 1.8 m2  ------------------------------------------------------------------------  PROCEDURE: Transesophageal and transthoracic  echocardiograms with 2-D, M-Mode and complete spectral and  color flow Doppler were performed in the echocardiography  laboratory.  Informed consent was first obtained for EVELINA.  The patient was sedated by Anesthesia.  The procedure was  monitored with automatic blood pressure monitoring, ECG  tracings and pulse oximetry.  Gag reflex was abolished with  topical Cetacaine and the transesophageal probe was placed  in the esophagus posterior to the heart without  complications.  The patient tolerated the procedure well.  Real-time and reconstructed 3-dimensional imaging was  performed.  Color Doppler analysis was carried out using  both 2D and 3D mapping.  Patient was injected with 10 cc's of aerosolized saline.  INDICATION: Nonrheumatic mitral (valve) insufficiency  (I34.0)  ------------------------------------------------------------------------  DIMENSIONS:  Dimensions:     Normal Values:  LA:     4.2 cm    2.0 - 4.0 cm  Ao:     2.5 cm    2.0 - 3.8 cm  SEPTUM: 0.8 cm    0.6 - 1.2 cm  PWT:    0.8 cm    0.6 - 1.1 cm  LVIDd:  4.7 cm 3.0 - 5.6 cm  LVIDs:  3.0 cm    1.8 - 4.0 cm  Derived Variables:  LVMI: 70 g/m2  RWT: 0.34  Fractional short: 36 %  Ejection Fraction (Teicholtz): 66 %  ------------------------------------------------------------------------  OBSERVATIONS:  Mitral Valve: Thickened mitral leaflets with mitral valve  prolapse involving the middle (A2) segment of the anterior  mitral leaflet. Severe mitral regurgitation with an  eccentric, posteriorly directed jet.  Vena contracta width  about  0.8 cm.  Systolic reversal of flow seen in the left  superior pulmonary vein.  Estimated regurgitant volume  about  100 mL (by the volumetric method).  Aortic Root: Normal aortic root, aortic arch, and  descending thoracic aorta.  Aortic Valve: Normal trileaflet aortic valve. No aortic  valve regurgitation seen.  Left Atrium: Mildly dilated left atrium.  LA volume index =  37 cc/m2.  No left atrium or left atrial appendage  thrombus.  Left Ventricle: Normal left ventricular systolic function.  No segmental wall motion abnormalities. Normal left  ventricular internal dimensions and wall thicknesses.  Right Heart: Normal right atrium. Normal right ventricular  size and function. Normal tricuspid valve.  Mild tricuspid  regurgitation. Normal pulmonic valve. Minimalpulmonic  regurgitation.  Pericardium/PleuraNormal pericardium with no pericardial  effusion.  Hemodynamic: Inadequate tricuspid regurgitation Doppler  envelope precludes estimation of RVSP. Contrast injection  demonstrates no evidence of a patent foramen ovale.  ------------------------------------------------------------------------  CONCLUSIONS:  1. Thickened mitral leaflets with mitral valve prolapse  involving the middle (A2) segment of the anterior mitral  leaflet. Severe mitral regurgitation with an eccentric,  posteriorly directed jet.  Vena contracta width about  0.8  cm.  Systolic reversal of flow seen in the left superior  pulmonary vein.  Estimated regurgitant volume about  100 mL  (by the volumetric method).  2. Normal trileafletaortic valve. No aortic valve  regurgitation seen.  3. Normal aortic root, aortic arch, and descending thoracic  aorta.  4. Mildly dilated left atrium.  LA volume index = 37 cc/m2.   No left atrium or left atrial appendage thrombus.  5. Normal left ventricular internal dimensions and wall  thicknesses.  6. Normal left ventricular systolic function. No segmental  wall motion abnormalities.  7. Normal right ventricular size and function.  8. Inadequate tricuspid regurgitation Doppler envelope  precludes estimation of RVSP.  9. Contrast injection demonstrates no evidence of a patent  foramen ovale.  ------------------------------------------------------------------------  Confirmed on  10/27/2016 - 07:32:03 by Alan Martin M.D.  ------------------------------------------------------------------------      EXAM:  CT BRAIN        PROCEDURE DATE:  Jun 19 2017     INTERPRETATION:  HISTORY: Fall from standing. Not talking.    Technique: CT of the head was performed without contrast.    Multiple contiguous axial images were acquired from the skullbaseto the   vertex without the administration of intravenous contrast.  Coronal and   sagittal reformations were made.    COMPARISON: CT head dated 6/18/2017.    FINDINGS:  The ventricles and sulci are within normal limits. There are no areas of   abnormal attenuation within the brain parenchyma. There is no   intraparenchymal hematoma, mass effect or midline shift. No abnormal   extra-axial fluid collections are present. There is no evidence of acute   transcortical territorial infarction.    The calvarium is intact. The visualized intraorbital compartments,   paranasal sinuses and mastoid complexes appear free of acute disease.    IMPRESSION:  No acute intracranial hemorrhage.  No evidence for acute transcortical territorial infarction.      CHEO OTERO M.D., ATTENDING RADIOLOGIST  This document has been electronically signed. Jun 19 2017 11:48AM

## 2017-06-22 NOTE — PROGRESS NOTE ADULT - ASSESSMENT
38 y/o female with a PMHx of severe MR S/P MVR at [facility] on 6/7 and discharged on 6/14 (discharged with a subtherapeutic INR on a Lovenox to Coumadin bridge but wasn't educated/didnt understand & never started coumadin), HTN, and HLD presents to ED with aphasia concerning for CVA now with acute worsening of baseline thrombocytopenia, HIT screen pending remains therapeutic on argatroban

## 2017-06-22 NOTE — PROGRESS NOTE ADULT - SUBJECTIVE AND OBJECTIVE BOX
Patient is a 37y old  Female who presents with a chief complaint of Aphasia      SUBJECTIVE / OVERNIGHT EVENTS:    No complaints, no recurrence of difficult talking.  "can I go home, INR is 1.5" explained to patient that her INR is being influenced by the argatroban and that we wont be able to discharge her until her INR is 2.5 off this.  Patient expressed understanding.  Denies any bleeding today.  Denies CP or SOB.  Ambulating.      MEDICATIONS  (STANDING):  atorvastatin 40milliGRAM(s) Oral at bedtime  ferrous    sulfate 325milliGRAM(s) Oral daily  argatroban Infusion 2MICROgram(s)/kG/Min IV Continuous <Continuous>    MEDICATIONS  (PRN):  acetaminophen   Tablet. 650milliGRAM(s) Oral every 6 hours PRN Mild Pain (1 - 3)    Vital Signs Last 24 Hrs  T(C): 36.8, Max: 36.8 (06-22 @ 05:31)  HR: 98 (88 - 98)  BP: 112/67 (112/67 - 124/75)  RR: 17 (17 - 17)  SpO2: 99% (99% - 100%)  Wt(kg): --    PHYSICAL EXAM:  GENERAL: NAD, well-developed  HEAD:  Atraumatic, Normocephalic  EYES: EOMI, PERRLA, conjunctiva and sclera clear  NECK: Supple, No JVD  CHEST/LUNG: Clear to auscultation bilaterally; No wheeze  HEART: Regular, +click loudest in MV area   ABDOMEN: Soft, Nontender, Nondistended; Bowel sounds present  EXTREMITIES:   warm and well perfused, No clubbing, cyanosis, or edema  PSYCH: AAOx3  NEUROLOGY: non-focal  SKIN: surgical incisions with sutures, c/d/i    LABS:                        8.7    7.66  )-----------( 97       ( 22 Jun 2017 06:49 )             28.3     06-22    139  |  103  |  17  ----------------------------<  106<H>  3.9   |  19<L>  |  0.68    Ca    9.6      22 Jun 2017 06:49  Mg     1.9     06-22      PT/INR - ( 22 Jun 2017 06:49 )   PT: 17.1 SEC;   INR: 1.51          PTT - ( 22 Jun 2017 06:49 )  PTT:64.3 SEC      LE Doppler: negative for DVT    Consultant(s) Notes Reviewed:  cards, heme    Care Discussed with Consultants/Other Providers: Patient is a 37y old  Female who presents with a chief complaint of Aphasia      SUBJECTIVE / OVERNIGHT EVENTS:    No complaints, no recurrence of difficult talking.  "can I go home, INR is 1.5" explained to patient that her INR is being influenced by the argatroban and that we wont be able to discharge her until her INR is 2.5 off this.  Patient expressed understanding.  Denies any bleeding today.  Denies CP or SOB.  Ambulating.      MEDICATIONS  (STANDING):  atorvastatin 40milliGRAM(s) Oral at bedtime  ferrous    sulfate 325milliGRAM(s) Oral daily  argatroban Infusion 2MICROgram(s)/kG/Min IV Continuous <Continuous>    MEDICATIONS  (PRN):  acetaminophen   Tablet. 650milliGRAM(s) Oral every 6 hours PRN Mild Pain (1 - 3)    Vital Signs Last 24 Hrs  T(C): 36.8, Max: 36.8 (06-22 @ 05:31)  HR: 98 (88 - 98)  BP: 112/67 (112/67 - 124/75)  RR: 17 (17 - 17)  SpO2: 99% (99% - 100%)  Wt(kg): --    PHYSICAL EXAM:  GENERAL: NAD, well-developed  HEAD:  Atraumatic, Normocephalic  EYES: EOMI, PERRLA, conjunctiva and sclera clear  NECK: Supple, No JVD  CHEST/LUNG: Clear to auscultation bilaterally; No wheeze  HEART: Regular, +click loudest in MV area   ABDOMEN: Soft, Nontender, Nondistended; Bowel sounds present  EXTREMITIES:   warm and well perfused, No clubbing, cyanosis, or edema  PSYCH: AAOx3  NEUROLOGY: non-focal  SKIN: surgical incisions with sutures with induration but no fluctuance, R groin incision with sq induration, no fluctuance, L groin wound appears to be dehisced with no purulent or granulation     LABS:                        8.7    7.66  )-----------( 97       ( 22 Jun 2017 06:49 )             28.3     06-22    139  |  103  |  17  ----------------------------<  106<H>  3.9   |  19<L>  |  0.68    Ca    9.6      22 Jun 2017 06:49  Mg     1.9     06-22      PT/INR - ( 22 Jun 2017 06:49 )   PT: 17.1 SEC;   INR: 1.51          PTT - ( 22 Jun 2017 06:49 )  PTT:64.3 SEC      LE Doppler: negative for DVT    Consultant(s) Notes Reviewed:  cards, heme    Care Discussed with Consultants/Other Providers:

## 2017-06-22 NOTE — PROGRESS NOTE ADULT - PROBLEM SELECTOR PLAN 1
100s to 58--> 97 since changing to argatroban, no s/s of thrombosis, concern for possible HIT pending HIT screen result  -heme c/s appreciated   -argatroban gtt, hold coumadin until heme eval  -HIT screen sent (called lab as >24 hours without result, report will be ready in 40 min) 100s to 58--> 97 since changing to argatroban, no s/s of thrombosis, concern for possible HIT pending HIT screen result  -heme c/s appreciated   -argatroban gtt, hold coumadin until ?plt 150 however baseline was lower so will f/u with heme  -HIT screen sent (called lab as >24 hours without result, report will be ready in 40 min) 100s to 58--> 97 since changing to argatroban, no s/s of thrombosis, concern for possible HIT pending HIT screen result  -heme c/s appreciated   -argatroban gtt, hold coumadin until ?plt 150 however baseline was lower so will f/u with heme  -HIT screen sent (called lab as >24 hours without result, report will be ready in 40 min)  - checked HIV and hep serologies for other etiologies of low plt, HIV neg, hep pending

## 2017-06-23 DIAGNOSIS — D75.82 HEPARIN INDUCED THROMBOCYTOPENIA (HIT): ICD-10-CM

## 2017-06-23 LAB
APTT BLD: 64.6 SEC — HIGH (ref 27.5–37.4)
BUN SERPL-MCNC: 18 MG/DL — SIGNIFICANT CHANGE UP (ref 7–23)
CALCIUM SERPL-MCNC: 9.4 MG/DL — SIGNIFICANT CHANGE UP (ref 8.4–10.5)
CHLORIDE SERPL-SCNC: 103 MMOL/L — SIGNIFICANT CHANGE UP (ref 98–107)
CO2 SERPL-SCNC: 19 MMOL/L — LOW (ref 22–31)
CREAT SERPL-MCNC: 0.59 MG/DL — SIGNIFICANT CHANGE UP (ref 0.5–1.3)
GLUCOSE SERPL-MCNC: 111 MG/DL — HIGH (ref 70–99)
HCT VFR BLD CALC: 27 % — LOW (ref 34.5–45)
HGB BLD-MCNC: 8.4 G/DL — LOW (ref 11.5–15.5)
INR BLD: 1.61 — HIGH (ref 0.88–1.17)
MAGNESIUM SERPL-MCNC: 1.8 MG/DL — SIGNIFICANT CHANGE UP (ref 1.6–2.6)
MCHC RBC-ENTMCNC: 25.5 PG — LOW (ref 27–34)
MCHC RBC-ENTMCNC: 31.1 % — LOW (ref 32–36)
MCV RBC AUTO: 82.1 FL — SIGNIFICANT CHANGE UP (ref 80–100)
PHOSPHATE SERPL-MCNC: 4.3 MG/DL — SIGNIFICANT CHANGE UP (ref 2.5–4.5)
PLATELET # BLD AUTO: 123 K/UL — LOW (ref 150–400)
PMV BLD: 11.8 FL — SIGNIFICANT CHANGE UP (ref 7–13)
POTASSIUM SERPL-MCNC: 4.1 MMOL/L — SIGNIFICANT CHANGE UP (ref 3.5–5.3)
POTASSIUM SERPL-SCNC: 4.1 MMOL/L — SIGNIFICANT CHANGE UP (ref 3.5–5.3)
PROTHROM AB SERPL-ACNC: 18.2 SEC — HIGH (ref 9.8–13.1)
RBC # BLD: 3.29 M/UL — LOW (ref 3.8–5.2)
RBC # FLD: 14.6 % — HIGH (ref 10.3–14.5)
SODIUM SERPL-SCNC: 139 MMOL/L — SIGNIFICANT CHANGE UP (ref 135–145)
WBC # BLD: 7.61 K/UL — SIGNIFICANT CHANGE UP (ref 3.8–10.5)
WBC # FLD AUTO: 7.61 K/UL — SIGNIFICANT CHANGE UP (ref 3.8–10.5)

## 2017-06-23 PROCEDURE — 99232 SBSQ HOSP IP/OBS MODERATE 35: CPT | Mod: GC

## 2017-06-23 PROCEDURE — 99233 SBSQ HOSP IP/OBS HIGH 50: CPT | Mod: GC

## 2017-06-23 PROCEDURE — 99233 SBSQ HOSP IP/OBS HIGH 50: CPT

## 2017-06-23 RX ADMIN — ATORVASTATIN CALCIUM 40 MILLIGRAM(S): 80 TABLET, FILM COATED ORAL at 21:11

## 2017-06-23 RX ADMIN — Medication 100 MILLIGRAM(S): at 12:52

## 2017-06-23 RX ADMIN — Medication 325 MILLIGRAM(S): at 12:52

## 2017-06-23 RX ADMIN — ARGATROBAN 9.58 MICROGRAM(S)/KG/MIN: 50 INJECTION, SOLUTION INTRAVENOUS at 08:49

## 2017-06-23 RX ADMIN — Medication 100 MILLIGRAM(S): at 21:12

## 2017-06-23 RX ADMIN — MUPIROCIN 1 APPLICATION(S): 20 OINTMENT TOPICAL at 05:13

## 2017-06-23 RX ADMIN — Medication 100 MILLIGRAM(S): at 05:13

## 2017-06-23 NOTE — ADVANCED PRACTICE NURSE CONSULT - RECOMMEDATIONS
Recommend vascular surgery for previous cannulation site    Topical Recommendations    Anterior 4-5th Intercoastal Space area- clean with Saf Clens. Pat dry. Apply Liquid barrier film to periwound skin. Apply Medihoney gel to wound base. Cover with foam with borders. Change daily.     Left groin-Gently Clean with NS. Apply Liquid barrier film to periwound skin. Cover wound base with Aquacel AG. Cover with foam with borders. Change daily.     recommendations discussed with Mendez Abad     Please contact Wound Care Service Line if we can be of further assistance (ext [phone]).

## 2017-06-23 NOTE — PROGRESS NOTE ADULT - SUBJECTIVE AND OBJECTIVE BOX
Cardiology/Vascular Medicine Inpatient Progress Note    Asked by Dr. Davis to see patient re: anticoagulation management in setting of recent MVR.    Preliminary review of TTE: mobile components noted on chordae.  Recommend EVELINA.    Current INR still subtherapeutic:  PT/INR - ( 23 Jun 2017 07:02 )   PT: 18.2 SEC;   INR: 1.61     PTT - ( 23 Jun 2017 07:02 )  PTT:64.6 SEC             Vital Signs Last 24 Hrs  T(C): 36.5, Max: 37.2 (06-22 @ 21:10)  T(F): 97.7, Max: 99 (06-22 @ 21:10)  HR: 91 (90 - 91)  BP: 111/69 (96/58 - 111/69)  BP(mean): --  RR: 18 (18 - 18)  SpO2: 100% (100% - 100%)    Appearance: Normal	  HEENT:   Normal oral mucosa, PERRL, EOMI	  Lymphatic: No lymphadenopathy  Cardiovascular: Normal S1 S2, No JVD, No murmurs, +crisp click, No edema  Respiratory: Lungs clear to auscultation	  Psychiatry: A & O x 3, Mood & affect appropriate  Gastrointestinal:  Soft, Non-tender, + BS	  Skin: No rashes, No ecchymoses, No cyanosis	  Neurologic: Non-focal  Extremities: Normal range of motion, No clubbing, cyanosis or edema  Vascular: Peripheral pulses palpable 2+ bilaterally    MEDICATIONS  (STANDING):  atorvastatin 40milliGRAM(s) Oral at bedtime  ferrous    sulfate 325milliGRAM(s) Oral daily  argatroban Infusion 2MICROgram(s)/kG/Min IV Continuous <Continuous>  docusate sodium 100milliGRAM(s) Oral three times a day  mupirocin 2% Ointment 1Application(s) Topical two times a day    MEDICATIONS  (PRN):  acetaminophen   Tablet. 650milliGRAM(s) Oral every 6 hours PRN Mild Pain (1 - 3)  senna 2Tablet(s) Oral at bedtime PRN Constipation        LABS:	 	                          8.4    7.61  )-----------( 123      ( 23 Jun 2017 07:02 )             27.0       06-23    139  |  103  |  18  ----------------------------<  111<H>  4.1   |  19<L>  |  0.59    Ca    9.4      23 Jun 2017 07:02  Phos  4.3     06-23  Mg     1.8     06-23      PT/INR - ( 23 Jun 2017 07:02 )   PT: 18.2 SEC;   INR: 1.61     PTT - ( 23 Jun 2017 07:02 )  PTT:64.6 SEC      Patient name: ДМИТРЙИ ALY  YOB: 1980   Age: 36 (F)   MR#: [id number]  Study Date: 10/26/2016  Location: O/PSonographer: Lauren Finkelstein  2nd Sonographer: Alan Martin MD  Study quality: Technically good  Referring Physician: Doron Plaza MD  Blood Pressure: 122/82 mmHg  Height: 150 cm  Weight: 80 kg  BSA: 1.8 m2  ------------------------------------------------------------------------  PROCEDURE: Transesophageal and transthoracic  echocardiograms with 2-D, M-Mode and complete spectral and  color flow Doppler were performed in the echocardiography  laboratory.  Informed consent was first obtained for EVELINA.  The patient was sedated by Anesthesia.  The procedure was  monitored with automatic blood pressure monitoring, ECG  tracings and pulse oximetry.  Gag reflex was abolished with  topical Cetacaine and the transesophageal probe was placed  in the esophagus posterior to the heart without  complications.  The patient tolerated the procedure well.  Real-time and reconstructed 3-dimensional imaging was  performed.  Color Doppler analysis was carried out using  both 2D and 3D mapping.  Patient was injected with 10 cc's of aerosolized saline.  INDICATION: Nonrheumatic mitral (valve) insufficiency  (I34.0)  ------------------------------------------------------------------------  DIMENSIONS:  Dimensions:     Normal Values:  LA:     4.2 cm    2.0 - 4.0 cm  Ao:     2.5 cm    2.0 - 3.8 cm  SEPTUM: 0.8 cm    0.6 - 1.2 cm  PWT:    0.8 cm    0.6 - 1.1 cm  LVIDd:  4.7 cm 3.0 - 5.6 cm  LVIDs:  3.0 cm    1.8 - 4.0 cm  Derived Variables:  LVMI: 70 g/m2  RWT: 0.34  Fractional short: 36 %  Ejection Fraction (Teicholtz): 66 %  ------------------------------------------------------------------------  OBSERVATIONS:  Mitral Valve: Thickened mitral leaflets with mitral valve  prolapse involving the middle (A2) segment of the anterior  mitral leaflet. Severe mitral regurgitation with an  eccentric, posteriorly directed jet.  Vena contracta width  about  0.8 cm.  Systolic reversal of flow seen in the left  superior pulmonary vein.  Estimated regurgitant volume  about  100 mL (by the volumetric method).  Aortic Root: Normal aortic root, aortic arch, and  descending thoracic aorta.  Aortic Valve: Normal trileaflet aortic valve. No aortic  valve regurgitation seen.  Left Atrium: Mildly dilated left atrium.  LA volume index =  37 cc/m2.  No left atrium or left atrial appendage  thrombus.  Left Ventricle: Normal left ventricular systolic function.  No segmental wall motion abnormalities. Normal left  ventricular internal dimensions and wall thicknesses.  Right Heart: Normal right atrium. Normal right ventricular  size and function. Normal tricuspid valve.  Mild tricuspid  regurgitation. Normal pulmonic valve. Minimalpulmonic  regurgitation.  Pericardium/PleuraNormal pericardium with no pericardial  effusion.  Hemodynamic: Inadequate tricuspid regurgitation Doppler  envelope precludes estimation of RVSP. Contrast injection  demonstrates no evidence of a patent foramen ovale.  ------------------------------------------------------------------------  CONCLUSIONS:  1. Thickened mitral leaflets with mitral valve prolapse  involving the middle (A2) segment of the anterior mitral  leaflet. Severe mitral regurgitation with an eccentric,  posteriorly directed jet.  Vena contracta width about  0.8  cm.  Systolic reversal of flow seen in the left superior  pulmonary vein.  Estimated regurgitant volume about  100 mL  (by the volumetric method).  2. Normal trileafletaortic valve. No aortic valve  regurgitation seen.  3. Normal aortic root, aortic arch, and descending thoracic  aorta.  4. Mildly dilated left atrium.  LA volume index = 37 cc/m2.   No left atrium or left atrial appendage thrombus.  5. Normal left ventricular internal dimensions and wall  thicknesses.  6. Normal left ventricular systolic function. No segmental  wall motion abnormalities.  7. Normal right ventricular size and function.  8. Inadequate tricuspid regurgitation Doppler envelope  precludes estimation of RVSP.  9. Contrast injection demonstrates no evidence of a patent  foramen ovale.  ------------------------------------------------------------------------  Confirmed on  10/27/2016 - 07:32:03 by Alan Martin M.D.  ------------------------------------------------------------------------      EXAM:  CT BRAIN        PROCEDURE DATE:  Jun 19 2017     INTERPRETATION:  HISTORY: Fall from standing. Not talking.    Technique: CT of the head was performed without contrast.    Multiple contiguous axial images were acquired from the skullbaseto the   vertex without the administration of intravenous contrast.  Coronal and   sagittal reformations were made.    COMPARISON: CT head dated 6/18/2017.    FINDINGS:  The ventricles and sulci are within normal limits. There are no areas of   abnormal attenuation within the brain parenchyma. There is no   intraparenchymal hematoma, mass effect or midline shift. No abnormal   extra-axial fluid collections are present. There is no evidence of acute   transcortical territorial infarction.    The calvarium is intact. The visualized intraorbital compartments,   paranasal sinuses and mastoid complexes appear free of acute disease.    IMPRESSION:  No acute intracranial hemorrhage.  No evidence for acute transcortical territorial infarction.      CHEO OTERO M.D., ATTENDING RADIOLOGIST  This document has been electronically signed. Jun 19 2017 11:48AM Cardiology/Vascular Medicine Inpatient Progress Note    Asked by Dr. Davis to see patient re: anticoagulation management in setting of recent MVR.    Preliminary review of TTE: mobile components noted on chordae.  Recommend EVELINA.  +Groin wound dehiscence.  D/W Dr. Zimmer.    Current INR still subtherapeutic:  PT/INR - ( 23 Jun 2017 07:02 )   PT: 18.2 SEC;   INR: 1.61     PTT - ( 23 Jun 2017 07:02 )  PTT:64.6 SEC             Vital Signs Last 24 Hrs  T(C): 36.5, Max: 37.2 (06-22 @ 21:10)  T(F): 97.7, Max: 99 (06-22 @ 21:10)  HR: 91 (90 - 91)  BP: 111/69 (96/58 - 111/69)  BP(mean): --  RR: 18 (18 - 18)  SpO2: 100% (100% - 100%)    Appearance: Normal	  HEENT:   Normal oral mucosa, PERRL, EOMI	  Lymphatic: No lymphadenopathy  Cardiovascular: Normal S1 S2, No JVD, No murmurs, +crisp click, No edema  Respiratory: Lungs clear to auscultation	  Psychiatry: A & O x 3, Mood & affect appropriate  Gastrointestinal:  Soft, Non-tender, + BS	  Skin: No rashes, No ecchymoses, No cyanosis	  Neurologic: Non-focal  Extremities: Normal range of motion, No clubbing, cyanosis or edema  Vascular: Peripheral pulses palpable 2+ bilaterally    MEDICATIONS  (STANDING):  atorvastatin 40milliGRAM(s) Oral at bedtime  ferrous    sulfate 325milliGRAM(s) Oral daily  argatroban Infusion 2MICROgram(s)/kG/Min IV Continuous <Continuous>  docusate sodium 100milliGRAM(s) Oral three times a day  mupirocin 2% Ointment 1Application(s) Topical two times a day    MEDICATIONS  (PRN):  acetaminophen   Tablet. 650milliGRAM(s) Oral every 6 hours PRN Mild Pain (1 - 3)  senna 2Tablet(s) Oral at bedtime PRN Constipation        LABS:	 	                          8.4    7.61  )-----------( 123      ( 23 Jun 2017 07:02 )             27.0       06-23    139  |  103  |  18  ----------------------------<  111<H>  4.1   |  19<L>  |  0.59    Ca    9.4      23 Jun 2017 07:02  Phos  4.3     06-23  Mg     1.8     06-23      PT/INR - ( 23 Jun 2017 07:02 )   PT: 18.2 SEC;   INR: 1.61     PTT - ( 23 Jun 2017 07:02 )  PTT:64.6 SEC      Patient name: ДМИТРИЙ ALY  YOB: 1980   Age: 36 (F)   MR#: [id number]  Study Date: 10/26/2016  Location: O/PSonographer: Lauren Finkelstein  [facility] Sonographer: Alan Martin MD  Study quality: Technically good  Referring Physician: Doron Plaza MD  Blood Pressure: 122/82 mmHg  Height: 150 cm  Weight: 80 kg  BSA: 1.8 m2  ------------------------------------------------------------------------  PROCEDURE: Transesophageal and transthoracic  echocardiograms with 2-D, M-Mode and complete spectral and  color flow Doppler were performed in the echocardiography  laboratory.  Informed consent was first obtained for EVELINA.  The patient was sedated by Anesthesia.  The procedure was  monitored with automatic blood pressure monitoring, ECG  tracings and pulse oximetry.  Gag reflex was abolished with  topical Cetacaine and the transesophageal probe was placed  in the esophagus posterior to the heart without  complications.  The patient tolerated the procedure well.  Real-time and reconstructed 3-dimensional imaging was  performed.  Color Doppler analysis was carried out using  both 2D and 3D mapping.  Patient was injected with 10 cc's of aerosolized saline.  INDICATION: Nonrheumatic mitral (valve) insufficiency  (I34.0)  ------------------------------------------------------------------------  DIMENSIONS:  Dimensions:     Normal Values:  LA:     4.2 cm    2.0 - 4.0 cm  Ao:     2.5 cm    2.0 - 3.8 cm  SEPTUM: 0.8 cm    0.6 - 1.2 cm  PWT:    0.8 cm    0.6 - 1.1 cm  LVIDd:  4.7 cm 3.0 - 5.6 cm  LVIDs:  3.0 cm    1.8 - 4.0 cm  Derived Variables:  LVMI: 70 g/m2  RWT: 0.34  Fractional short: 36 %  Ejection Fraction (Teicholtz): 66 %  ------------------------------------------------------------------------  OBSERVATIONS:  Mitral Valve: Thickened mitral leaflets with mitral valve  prolapse involving the middle (A2) segment of the anterior  mitral leaflet. Severe mitral regurgitation with an  eccentric, posteriorly directed jet.  Vena contracta width  about  0.8 cm.  Systolic reversal of flow seen in the left  superior pulmonary vein.  Estimated regurgitant volume  about  100 mL (by the volumetric method).  Aortic Root: Normal aortic root, aortic arch, and  descending thoracic aorta.  Aortic Valve: Normal trileaflet aortic valve. No aortic  valve regurgitation seen.  Left Atrium: Mildly dilated left atrium.  LA volume index =  37 cc/m2.  No left atrium or left atrial appendage  thrombus.  Left Ventricle: Normal left ventricular systolic function.  No segmental wall motion abnormalities. Normal left  ventricular internal dimensions and wall thicknesses.  Right Heart: Normal right atrium. Normal right ventricular  size and function. Normal tricuspid valve.  Mild tricuspid  regurgitation. Normal pulmonic valve. Minimalpulmonic  regurgitation.  Pericardium/PleuraNormal pericardium with no pericardial  effusion.  Hemodynamic: Inadequate tricuspid regurgitation Doppler  envelope precludes estimation of RVSP. Contrast injection  demonstrates no evidence of a patent foramen ovale.  ------------------------------------------------------------------------  CONCLUSIONS:  1. Thickened mitral leaflets with mitral valve prolapse  involving the middle (A2) segment of the anterior mitral  leaflet. Severe mitral regurgitation with an eccentric,  posteriorly directed jet.  Vena contracta width about  0.8  cm.  Systolic reversal of flow seen in the left superior  pulmonary vein.  Estimated regurgitant volume about  100 mL  (by the volumetric method).  2. Normal trileafletaortic valve. No aortic valve  regurgitation seen.  3. Normal aortic root, aortic arch, and descending thoracic  aorta.  4. Mildly dilated left atrium.  LA volume index = 37 cc/m2.   No left atrium or left atrial appendage thrombus.  5. Normal left ventricular internal dimensions and wall  thicknesses.  6. Normal left ventricular systolic function. No segmental  wall motion abnormalities.  7. Normal right ventricular size and function.  8. Inadequate tricuspid regurgitation Doppler envelope  precludes estimation of RVSP.  9. Contrast injection demonstrates no evidence of a patent  foramen ovale.  ------------------------------------------------------------------------  Confirmed on  10/27/2016 - 07:32:03 by Alan Martin M.D.  ------------------------------------------------------------------------      EXAM:  CT BRAIN        PROCEDURE DATE:  Jun 19 2017     INTERPRETATION:  HISTORY: Fall from standing. Not talking.    Technique: CT of the head was performed without contrast.    Multiple contiguous axial images were acquired from the skullbaseto the   vertex without the administration of intravenous contrast.  Coronal and   sagittal reformations were made.    COMPARISON: CT head dated 6/18/2017.    FINDINGS:  The ventricles and sulci are within normal limits. There are no areas of   abnormal attenuation within the brain parenchyma. There is no   intraparenchymal hematoma, mass effect or midline shift. No abnormal   extra-axial fluid collections are present. There is no evidence of acute   transcortical territorial infarction.    The calvarium is intact. The visualized intraorbital compartments,   paranasal sinuses and mastoid complexes appear free of acute disease.    IMPRESSION:  No acute intracranial hemorrhage.  No evidence for acute transcortical territorial infarction.      CHEO OTERO M.D., ATTENDING RADIOLOGIST  This document has been electronically signed. Jun 19 2017 11:48AM

## 2017-06-23 NOTE — CHART NOTE - NSCHARTNOTEFT_GEN_A_CORE
pt noted with right chest indurated wound and left groin wound from robotic MVR cannulation. Wound care called, left groin surgical wound for cannulation dyshesive recommended to call vascular surgery. Writer spoke with vascular surgery, they do not close old wounds and does not do debridement. Recommend to consult wound MD Dr Guerra. Will consult wound MD. Will continue to monitor.

## 2017-06-23 NOTE — PROGRESS NOTE ADULT - PROBLEM SELECTOR PLAN 1
Plan for EVELINA    Heparin stopped and argatroban gtt started in setting of platelet count drop.  Continue with anticoagulation with warfarin for INR goal 2.5 to 3.5 if patient is stable without evidence of bleeding in setting of thrombocytopenia.    Hematology evaluation.  Review blood smear to see if platelets have been clumping.

## 2017-06-23 NOTE — PROGRESS NOTE ADULT - SUBJECTIVE AND OBJECTIVE BOX
Hematology Follow-up    INTERVAL HPI/OVERNIGHT EVENTS:  Patient S&E at bedside. No o/n events, patient resting comfortably. No complaints at this time. Patient denies fever, chills, dizziness, weakness, CP, palpitations, SOB, cough, N/V/D/C, dysuria, changes in bowel movements, LE edema.    VITAL SIGNS:  T(F): 97.7  HR: 91  BP: 111/69  RR: 18  SpO2: 100%  Wt(kg): --    PHYSICAL EXAM:    Constitutional: AAOx3, NAD   Eyes: PERRL, EOMI, sclera non-icteric  Neck: supple, no masses, no JVD  Respiratory: CTA b/l, good air entry b/l, no wheezing, rhonchi, rales, with normal respiratory effort and no intercostal retractions  Cardiovascular: RRR, normal S1S2, mechanical click  Gastrointestinal: soft, NTND, no masses palpable, BS normal in all four quadrants, no HSM  Extremities:  no c/c/e  Neurological: Grossly intact  Skin: Normal temperature    MEDICATIONS  (STANDING):  atorvastatin 40milliGRAM(s) Oral at bedtime  ferrous    sulfate 325milliGRAM(s) Oral daily  argatroban Infusion 2MICROgram(s)/kG/Min IV Continuous <Continuous>  docusate sodium 100milliGRAM(s) Oral three times a day  mupirocin 2% Ointment 1Application(s) Topical two times a day    MEDICATIONS  (PRN):  acetaminophen   Tablet. 650milliGRAM(s) Oral every 6 hours PRN Mild Pain (1 - 3)  senna 2Tablet(s) Oral at bedtime PRN Constipation    Allergies:    heparin containing compounds (Other)      LABS:                        8.4    7.61  )-----------( 123      ( 23 Jun 2017 07:02 )             27.0     06-23    139  |  103  |  18  ----------------------------<  111<H>  4.1   |  19<L>  |  0.59    Ca    9.4      23 Jun 2017 07:02  Phos  4.3     06-23  Mg     1.8     06-23      PT/INR - ( 23 Jun 2017 07:02 )   PT: 18.2 SEC;   INR: 1.61          PTT - ( 23 Jun 2017 07:02 )  PTT:64.6 SEC     PF-4 Ab: 1.876 (+).   NADINE Pending      RADIOLOGY & ADDITIONAL TESTS:  Studies reviewed.

## 2017-06-23 NOTE — PROGRESS NOTE ADULT - PROBLEM SELECTOR PLAN 1
thrombocytopenia 2/2 heparin; 58-->97-->112-->123 since changing to argatroban, no s/s of thrombosis, HIT screen +; Hep serologies and HIV negative (as other causes of thrombocytopenia); also no s/s of infecton  -f/u NADINE  -appreciate heme c/s c/w argatroban per their note: Continue argatroban and warfarin administration until INR is greater than 4 (on combined treatment), then discontinue argatroban gtt. 4 hours later, repeat INR to check for therapeutic coumadin levels (goal here is 2.5-3.5 given mechanical valve). If therapeutic, permanently discontinue argatroban. If subtherapeutic, restart argatroban and repeat process again when INR is higher on combined therapy (i.e, 4.5)  -Given mechanical MVR, pt will require indefinite anticoagulation thrombocytopenia 2/2 heparin; 58-->97-->112-->123 since changing to argatroban (per d/w OP CT surgeon office pre-op plt 265), no s/s of thrombosis, HIT screen +; Hep serologies and HIV negative (as other causes of thrombocytopenia); also no s/s of infecton  -f/u NADINE  -appreciate heme c/s c/w argatroban per their note: Continue argatroban and warfarin administration until INR is greater than 4 (on combined treatment), then discontinue argatroban gtt. 4 hours later, repeat INR to check for therapeutic coumadin levels (goal here is 2.5-3.5 given mechanical valve). If therapeutic, permanently discontinue argatroban. If subtherapeutic, restart argatroban and repeat process again when INR is higher on combined therapy (i.e, 4.5)  -Given mechanical MVR, pt will require indefinite anticoagulation thrombocytopenia 2/2 heparin; 58-->97-->112-->123 since changing to argatroban (per d/w OP CT surgeon office pre-op plt 265), no s/s of thrombosis, HIT screen +; Hep serologies and HIV negative (as other causes of thrombocytopenia); also no s/s of infecton  -f/u NADINE  -per heme NO COUMADIN until plt >150; appreciate heme c/s c/w argatroban per their note: Continue argatroban and warfarin administration until INR is greater than 4 (on combined treatment), then discontinue argatroban gtt. 4 hours later, repeat INR to check for therapeutic coumadin levels (goal here is 2.5-3.5 given mechanical valve). If therapeutic, permanently discontinue argatroban. If subtherapeutic, restart argatroban and repeat process again when INR is higher on combined therapy (i.e, 4.5)  -Given mechanical MVR, pt will require indefinite anticoagulation thrombocytopenia 2/2 heparin; 58-->97-->112-->123 since changing to argatroban (per d/w OP CT surgeon office pre-op plt 265), no s/s of thrombosis, HIT screen +; Hep serologies and HIV negative (as other causes of thrombocytopenia); also no s/s of infecton  -f/u NADINE  -per heme NO COUMADIN until plt >150; appreciate heme c/s c/w argatroban per their note: Once plt >150 with c/w argatroban and add warfarin until INR is greater than 4 (on combined treatment), then discontinue argatroban gtt. 4 hours later, repeat INR to check for therapeutic coumadin levels (goal here is 2.5-3.5 given mechanical valve). If therapeutic, permanently discontinue argatroban. If subtherapeutic, restart argatroban and repeat process again when INR is higher on combined therapy (i.e, 4.5)  -Given mechanical MVR, pt will require indefinite anticoagulation

## 2017-06-23 NOTE — ADVANCED PRACTICE NURSE CONSULT - ASSESSMENT
General: A&Ox 4, continent of urine and stool. Skin warm linear scar with evidence of previous sutures noted on right anterior medial thoracic. 100% re-epithelization in right groin as evident by hypopigmentation.     Anterior 4-5th th ICS previous site of chest tube- wound measuring 0.4cmx0.8cmx0.1cm. Tissue appears as 100% biofilm. Small serosanguinous drainage. No odor. Periwound skin with hypopigmentation. No induration, no erythema, no increased warmth. Goals of care: autolytic debridement of biofilm, absorb/manage exudate, protect periwound skin.     Left groin- Dehisce surgical incision linear wound measuring [id number]. Tissue with 100% red, moist, friable(suspicion of high bioburden) granulation tissue, 2 intact black sutures noted in wound bed.  Small serosanguinous drainage No odor Periwound skin with hypopigmentation. Palpated induration circumferentially, no increased warmth, no erythema. Goals of care: absorb/manage exudate, decrease bioburden, protect periwound skin.

## 2017-06-23 NOTE — ADVANCED PRACTICE NURSE CONSULT - REASON FOR CONSULT
Patient seen on skin care rounds after wound care referral received for assessment of skin impairment and recommendations of topical management. Chart reviewed: Serum albumin 3.6g/dl, David 21-22, patient and  interviewed. Patient primarily speaks Spanish, understands English, Patient prefers  to translate. As per , s/p discharge patient was receiving VNS for wound care.  unsure of topical treatment use. Patient denies pain, patient endorses small drainage from left groin( unsure of drainage type).  Patient H/O severe MR S/P mechanical MVR at [facility] on 6/7/17, anemia,  HTN and HLD. Patient admitted with aphasia this am. Patient is being seen by Neurology, Rehab medicine and Cardiology services.

## 2017-06-23 NOTE — PROGRESS NOTE ADULT - SUBJECTIVE AND OBJECTIVE BOX
Patient is a 37y old  Female who presents with a chief complaint of Aphasia      SUBJECTIVE / OVERNIGHT EVENTS:    No overnight events.  Patient feeling well.  No chest pain, no SOB, no focal weakness, no reported bleeding.  No fevers, chills.  Has a mild cough.  Walking to bathroom, tolerating diet.     MEDICATIONS  (STANDING):  atorvastatin 40milliGRAM(s) Oral at bedtime  ferrous    sulfate 325milliGRAM(s) Oral daily  argatroban Infusion 2MICROgram(s)/kG/Min IV Continuous <Continuous>  docusate sodium 100milliGRAM(s) Oral three times a day  mupirocin 2% Ointment 1Application(s) Topical two times a day    MEDICATIONS  (PRN):  acetaminophen   Tablet. 650milliGRAM(s) Oral every 6 hours PRN Mild Pain (1 - 3)  senna 2Tablet(s) Oral at bedtime PRN Constipation      Vital Signs Last 24 Hrs  T(C): 36.5, Max: 37.2 (06-22 @ 21:10)  HR: 91 (90 - 91)  BP: 111/69 (96/58 - 111/69)  RR: 18 (18 - 18)  SpO2: 100% (100% - 100%)    PHYSICAL EXAM:  GENERAL: NAD, well-developed  HEAD:  Atraumatic, Normocephalic  EYES: EOMI, PERRLA, conjunctiva and sclera clear  NECK: Supple, No JVD  CHEST/LUNG: Clear to auscultation bilaterally; No wheeze  HEART: Regular, +click loudest in MV area   ABDOMEN: Soft, Nontender, Nondistended; Bowel sounds present  EXTREMITIES:   warm and well perfused, No clubbing, cyanosis, or edema  PSYCH: AAOx3  NEUROLOGY: non-focal  SKIN: R chest wall surgical incisions induration but no fluctuance, one slightly open without e/o infection, R groin incision with sq induration, no fluctuance, L groin wound appears to be dehisced with no purulence., + granulation sutures visible     LABS:                        8.4    7.61  )-----------( 123      ( 23 Jun 2017 07:02 )             27.0     06-23    139  |  103  |  18  ----------------------------<  111<H>  4.1   |  19<L>  |  0.59    Ca    9.4      23 Jun 2017 07:02  Phos  4.3     06-23  Mg     1.8     06-23      PT/INR - ( 23 Jun 2017 07:02 )   PT: 18.2 SEC;   INR: 1.61          PTT - ( 23 Jun 2017 07:02 )  PTT:64.6 SEC      LE doppler: no DVT  TTE: results pending, per cards possible mobile area near MV    Consultant(s) Notes Reviewed:  cards, heme    Care Discussed with Consultants/Other Providers: cards, OP CT surgeon (jeanne barbour MD)

## 2017-06-23 NOTE — PROGRESS NOTE ADULT - PROBLEM SELECTOR PLAN 1
-Given markedly positive PF-4 antibody, suspect that confirmatory serotonin-release assay will also result as positive, confirming HIT diagnosis  -c/w argatroban gtt, platelets are responding nicely off heparinoids  -Given cardiology's concern for abnormal TTE, would not yet bridge to coumadin as plan may be for EVELINA in near future. Ideally, patient would not have anticoagulation stopped at any point, as HIT is a prothrombotic phenomenon  -When platelets are consistently greater than 150k, would start transitioning to warfarin as below:  continue argatroban and warfarin administration until INR is greater than 4 (on combined treatment), then discontinue argatroban gtt. 4 hours later, repeat INR to check for therapeutic coumadin levels (goal here is 2.5-3.5 given mechanical valve). If therapeutic, permanently discontinue argatroban. If subtherapeutic, restart argatroban and repeat process again when INR is higher on combined therapy (i.e, 4.5)  -Given mechanical MVR, pt will require indefinite anticoagulation -Given markedly positive PF-4 antibody, suspect that confirmatory serotonin-release assay will also result as positive, confirming HIT diagnosis  -c/w argatroban gtt, platelets are responding nicely off heparinoids  -Given cardiology's concern for abnormal TTE, would clarify with cardiology appropriateness of full AC before planned EVELINA. Ideally, patient would not have anticoagulation stopped at any point, as HIT is a prothrombotic phenomenon  -If continuing warfarin bridge, please see instructions for discontinuation of argatroban as below (platelets should be greater than 150k before discontinuing argatroban):    -Continue argatroban and warfarin administration until INR is greater than 4 (on combined treatment), then discontinue argatroban gtt. 4 hours later, repeat INR to check for therapeutic coumadin levels (goal here is 2.5-3.5 given mechanical valve). If therapeutic, permanently discontinue argatroban. If subtherapeutic, restart argatroban and repeat process again when INR is higher on combined therapy (i.e, 4.5)  -Given mechanical MVR, pt will require indefinite anticoagulation

## 2017-06-23 NOTE — PROGRESS NOTE ADULT - ASSESSMENT
Probably acute embolic stroke in setting of recent MVR.  Mobile components noted attached to chordae on TTE.  Will recommend EVELINA.

## 2017-06-23 NOTE — PROGRESS NOTE ADULT - ASSESSMENT
38yo woman with MR s/p mechanical MVR in early June at OSH, now here with near syncopal event, progressive thrombocytopenia likely HIT

## 2017-06-23 NOTE — PROGRESS NOTE ADULT - ASSESSMENT
36 y/o female with a PMHx of severe MR S/P MVR at [facility] on 6/7 and discharged on 6/14 (discharged with a subtherapeutic INR on a Lovenox to Coumadin bridge but wasn't educated/didnt understand & never started coumadin), HTN, and HLD presents to ED with aphasia concerning for CVA now with acute worsening of baseline thrombocytopenia, HIT screen positive , NADINE pending, remains therapeutic on argatroban, plt improving.

## 2017-06-24 LAB
APTT BLD: 66.9 SEC — HIGH (ref 27.5–37.4)
BUN SERPL-MCNC: 18 MG/DL — SIGNIFICANT CHANGE UP (ref 7–23)
CALCIUM SERPL-MCNC: 9.6 MG/DL — SIGNIFICANT CHANGE UP (ref 8.4–10.5)
CHLORIDE SERPL-SCNC: 101 MMOL/L — SIGNIFICANT CHANGE UP (ref 98–107)
CO2 SERPL-SCNC: 16 MMOL/L — LOW (ref 22–31)
CREAT SERPL-MCNC: 0.68 MG/DL — SIGNIFICANT CHANGE UP (ref 0.5–1.3)
GLUCOSE SERPL-MCNC: 96 MG/DL — SIGNIFICANT CHANGE UP (ref 70–99)
HCT VFR BLD CALC: 27.8 % — LOW (ref 34.5–45)
HGB BLD-MCNC: 8.4 G/DL — LOW (ref 11.5–15.5)
INR BLD: 1.55 — HIGH (ref 0.88–1.17)
MAGNESIUM SERPL-MCNC: 1.8 MG/DL — SIGNIFICANT CHANGE UP (ref 1.6–2.6)
MCHC RBC-ENTMCNC: 24.7 PG — LOW (ref 27–34)
MCHC RBC-ENTMCNC: 30.2 % — LOW (ref 32–36)
MCV RBC AUTO: 81.8 FL — SIGNIFICANT CHANGE UP (ref 80–100)
PHOSPHATE SERPL-MCNC: 4.5 MG/DL — SIGNIFICANT CHANGE UP (ref 2.5–4.5)
PLATELET # BLD AUTO: 148 K/UL — LOW (ref 150–400)
PMV BLD: 10.7 FL — SIGNIFICANT CHANGE UP (ref 7–13)
POTASSIUM SERPL-MCNC: 4 MMOL/L — SIGNIFICANT CHANGE UP (ref 3.5–5.3)
POTASSIUM SERPL-SCNC: 4 MMOL/L — SIGNIFICANT CHANGE UP (ref 3.5–5.3)
PROTHROM AB SERPL-ACNC: 17.5 SEC — HIGH (ref 9.8–13.1)
RBC # BLD: 3.4 M/UL — LOW (ref 3.8–5.2)
RBC # FLD: 14.5 % — SIGNIFICANT CHANGE UP (ref 10.3–14.5)
SODIUM SERPL-SCNC: 137 MMOL/L — SIGNIFICANT CHANGE UP (ref 135–145)
WBC # BLD: 7.97 K/UL — SIGNIFICANT CHANGE UP (ref 3.8–10.5)
WBC # FLD AUTO: 7.97 K/UL — SIGNIFICANT CHANGE UP (ref 3.8–10.5)

## 2017-06-24 PROCEDURE — 99233 SBSQ HOSP IP/OBS HIGH 50: CPT

## 2017-06-24 PROCEDURE — 99232 SBSQ HOSP IP/OBS MODERATE 35: CPT

## 2017-06-24 RX ADMIN — Medication 100 MILLIGRAM(S): at 21:23

## 2017-06-24 RX ADMIN — ATORVASTATIN CALCIUM 40 MILLIGRAM(S): 80 TABLET, FILM COATED ORAL at 21:23

## 2017-06-24 RX ADMIN — Medication 100 MILLIGRAM(S): at 13:11

## 2017-06-24 RX ADMIN — Medication 325 MILLIGRAM(S): at 13:11

## 2017-06-24 RX ADMIN — Medication 100 MILLIGRAM(S): at 05:11

## 2017-06-24 NOTE — PROGRESS NOTE ADULT - ASSESSMENT
Probably acute embolic stroke in setting of recent MVR.  Mobile components noted attached to chordae on TTE.  As discussed above, low suspicion for endocarditis.  If thrombus, it is small and can be monitored for now; patient also on anticoagulation.  Most probable surgical material as per surgeon.  Will recommend follow-up echocardiogram in one week.

## 2017-06-24 NOTE — PROGRESS NOTE ADULT - ASSESSMENT
Assessment and Plan:   38 y/o female with a PMHx of severe MR S/P MVR at [facility] on 6/7 and discharged on 6/14 (discharged with a subtherapeutic INR on a Lovenox to Coumadin bridge but wasn't educated/didnt understand & never started coumadin), HTN, and HLD presents to ED with aphasia concerning for CVA now with acute worsening of baseline thrombocytopenia, HIT screen positive , NADINE pending, remains therapeutic on argatroban, plt improving.   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     thrombocytopenia 2/2 heparin;  HIT screen +;   Hep serologies and HIV negative (as other causes of thrombocytopenia); also no s/s of infection  -f/u NADINE  -per heme NO COUMADIN until plt >150;   Once plt >150 with c/w argatroban and add warfarin until INR is greater than 4 (on combined treatment), then discontinue argatroban gtt. 4 hours later, repeat INR to check for therapeutic coumadin levels (goal here is 2.5-3.5 given mechanical valve). If therapeutic, permanently discontinue argatroban. If subtherapeutic, restart argatroban and repeat process again when INR is higher on combined therapy (i.e, 4.5)  -Given mechanical MVR, pt will require indefinite anticoagulationthrombocytopenia 2/2 heparin; 58-->97-->112-->123 since changing to argatroban (per d/w OP CT surgeon office pre-op plt 265), no s/s of thrombosis, HIT screen +; Hep serologies and HIV negative (as other causes of thrombocytopenia); also no s/s of infecton  -f/u NADINE  -per heme NO COUMADIN until plt >150; appreciate heme c/s c/w argatroban per their note: Continue argatroban and warfarin administration until INR is greater than 4 (on combined treatment), then discontinue argatroban gtt. 4 hours later, repeat INR to check for therapeutic coumadin levels (goal here is 2.5-3.5 given mechanical valve). If therapeutic, permanently discontinue argatroban. If subtherapeutic, restart argatroban and repeat process again when INR is higher on combined therapy (i.e, 4.5)  -Given mechanical MVR, pt will require indefinite anticoagulation2.: Aphasia.   possible stroke   - given poor health literacy and possible CVA need bridging mary now w/ acute plt drop  will need inpatient bridge until therapeutic   - c/w argatroban gtt   INR goal 2.5-3.5, holding coumadin pending improvement in plt   for future reference valve compatible with MRI per OP CT surgeon.     3. MR (mitral regurgitation).     s/p robotic repair with mechanical valve  continue wound care   f/u with cardio  need repeat echo after 1 week   can be done as out patient   4. Anemia.  likely 2/2 post op status also hemolysis 2/2 valve  can start iron  transfuse <7  likely mild hemorrhoidal bleeding, monitor, bowel regimen.   5. HTN (hypertension).  monitor BP  6.HLD (hyperlipidemia).   continue statin

## 2017-06-24 NOTE — PROGRESS NOTE ADULT - SUBJECTIVE AND OBJECTIVE BOX
37y old  Female who presents with a chief complaint of Aphasia    SUBJECTIVE / OVERNIGHT EVENTS:  patient seen and examine at bed side   no acute issue over nights    MEDICATIONS  (STANDING):  atorvastatin 40milliGRAM(s) Oral at bedtime  ferrous    sulfate 325milliGRAM(s) Oral daily  argatroban Infusion 2MICROgram(s)/kG/Min IV Continuous <Continuous>  docusate sodium 100milliGRAM(s) Oral three times a day    MEDICATIONS  (PRN):  acetaminophen   Tablet. 650milliGRAM(s) Oral every 6 hours PRN Mild Pain (1 - 3)  senna 2Tablet(s) Oral at bedtime PRN Constipation      Vital Signs Last 24 Hrs  T(C): 36.8, Max: 36.8 (06-24 @ 05:00)  T(F): 98.2, Max: 98.2 (06-24 @ 05:00)  HR: 80 (80 - 99)  BP: 121/72 (112/70 - 121/72)  BP(mean): --  RR: 18 (18 - 18)  SpO2: 100% (100% - 100%)    PHYSICAL EXAM:  GENERAL: NAD, well-developed  HEAD:  Atraumatic, Normocephalic  EYES: EOMI, PERRLA, conjunctiva and sclera clear  NECK: Supple, No JVD  CHEST/LUNG: Clear to auscultation bilaterally; No wheeze  HEART: Regular, +click loudest in MV area   ABDOMEN: Soft, Nontender, Nondistended; Bowel sounds present  EXTREMITIES:   warm and well perfused, No clubbing, cyanosis, or edema  PSYCH: AAOx3  NEUROLOGY: non-focal  SKIN: R chest wall surgical incisions induration but no fluctuance, one slightly open without e/o infection, R groin incision with sq induration, no fluctuance, L groin wound appears to be dehisced with no purulence., + granulation sutures visible                             8.4    7.97  )-----------( 148      ( 24 Jun 2017 05:30 )             27.8       PT/INR - ( 24 Jun 2017 05:30 )   PT: 17.5 SEC;   INR: 1.55          PTT - ( 24 Jun 2017 05:30 )  PTT:66.9 SEC        06-24    137  |  101  |  18  ----------------------------<  96  4.0   |  16<L>  |  0.68    Ca    9.6      24 Jun 2017 05:30  Phos  4.5     06-24  Mg     1.8     06-24

## 2017-06-24 NOTE — PROGRESS NOTE ADULT - PROBLEM SELECTOR PLAN 1
F/U echocardiogram in one week.    Continue argatroban gtt as per protocol.  Platelet count improved..      Eventual anticoagulation with warfarin for INR goal 2.5 to 3.5 if patient is stable without evidence of bleeding in setting of thrombocytopenia.

## 2017-06-24 NOTE — PROGRESS NOTE ADULT - SUBJECTIVE AND OBJECTIVE BOX
Cardiology/Vascular Medicine Inpatient Progress Note    Asked by Dr. Davis to see patient re: anticoagulation management in setting of recent MVR.    Preliminary review of TTE: mobile components noted on chordae.  May represent residual surgical material or less likely thrombus. Doubt vegetation as patient has no symptoms or findings to support endocarditis.  Dr. Zimmer spoke with surgeon who performed valve replacement who thought this may be surgical material.  Will defer EVELINA and recommend surveillance TTE in 1 week to re-evaluate.  This can be performed with her outpatient cardiologist.  +Groin wound dehiscence--wound care consult ongoing.    Continuing with argatroban and eventual anticoagulation with warfarin.    Vital Signs Last 24 Hrs  T(C): 36.8, Max: 36.8 (06-24 @ 05:00)  T(F): 98.2, Max: 98.2 (06-24 @ 05:00)  HR: 80 (80 - 99)  BP: 121/72 (112/70 - 121/72)  BP(mean): --  RR: 18 (18 - 18)  SpO2: 100% (100% - 100%)    Appearance: Normal	  HEENT:   Normal oral mucosa, PERRL, EOMI	  Lymphatic: No lymphadenopathy  Cardiovascular: Normal S1 S2, No JVD, No murmurs, +crisp click, No edema  Respiratory: Lungs clear to auscultation	  Psychiatry: A & O x 3, Mood & affect appropriate  Gastrointestinal:  Soft, Non-tender, + BS	  Skin: No rashes, No ecchymoses, No cyanosis	  Neurologic: Non-focal  Extremities: Normal range of motion, No clubbing, cyanosis or edema  Vascular: Peripheral pulses palpable 2+ bilaterally    MEDICATIONS  (STANDING):  atorvastatin 40milliGRAM(s) Oral at bedtime  ferrous    sulfate 325milliGRAM(s) Oral daily  argatroban Infusion 2MICROgram(s)/kG/Min IV Continuous <Continuous>  docusate sodium 100milliGRAM(s) Oral three times a day    MEDICATIONS  (PRN):  acetaminophen   Tablet. 650milliGRAM(s) Oral every 6 hours PRN Mild Pain (1 - 3)  senna 2Tablet(s) Oral at bedtime PRN Constipation        LABS:	 	                            8.4    7.61  )-----------( 123      ( 23 Jun 2017 07:02 )             27.0     06-23    139  |  103  |  18  ----------------------------<  111<H>  4.1   |  19<L>  |  0.59    Ca    9.4      23 Jun 2017 07:02  Phos  4.3     06-23  Mg     1.8     06-23      PT/INR - ( 23 Jun 2017 07:02 )   PT: 18.2 SEC;   INR: 1.61          PTT - ( 23 Jun 2017 07:02 )  PTT:64.6 SEC    Patient name: ДМИТРИЙ ALY  YOB: 1980   Age: 36 (F)   MR#: [id number]  Study Date: 10/26/2016  Location: O/PSonographer: Lauren Finkelstein  2nd Sonographer: Alan Martin MD  Study quality: Technically good  Referring Physician: Doron Plaza MD  Blood Pressure: 122/82 mmHg  Height: 150 cm  Weight: 80 kg  BSA: 1.8 m2  ------------------------------------------------------------------------  PROCEDURE: Transesophageal and transthoracic  echocardiograms with 2-D, M-Mode and complete spectral and  color flow Doppler were performed in the echocardiography  laboratory.  Informed consent was first obtained for EVELINA.  The patient was sedated by Anesthesia.  The procedure was  monitored with automatic blood pressure monitoring, ECG  tracings and pulse oximetry.  Gag reflex was abolished with  topical Cetacaine and the transesophageal probe was placed  in the esophagus posterior to the heart without  complications.  The patient tolerated the procedure well.  Real-time and reconstructed 3-dimensional imaging was  performed.  Color Doppler analysis was carried out using  both 2D and 3D mapping.  Patient was injected with 10 cc's of aerosolized saline.  INDICATION: Nonrheumatic mitral (valve) insufficiency  (I34.0)  ------------------------------------------------------------------------  DIMENSIONS:  Dimensions:     Normal Values:  LA:     4.2 cm    2.0 - 4.0 cm  Ao:     2.5 cm    2.0 - 3.8 cm  SEPTUM: 0.8 cm    0.6 - 1.2 cm  PWT:    0.8 cm    0.6 - 1.1 cm  LVIDd:  4.7 cm 3.0 - 5.6 cm  LVIDs:  3.0 cm    1.8 - 4.0 cm  Derived Variables:  LVMI: 70 g/m2  RWT: 0.34  Fractional short: 36 %  Ejection Fraction (Teicholtz): 66 %  ------------------------------------------------------------------------  OBSERVATIONS:  Mitral Valve: Thickened mitral leaflets with mitral valve  prolapse involving the middle (A2) segment of the anterior  mitral leaflet. Severe mitral regurgitation with an  eccentric, posteriorly directed jet.  Vena contracta width  about  0.8 cm.  Systolic reversal of flow seen in the left  superior pulmonary vein.  Estimated regurgitant volume  about  100 mL (by the volumetric method).  Aortic Root: Normal aortic root, aortic arch, and  descending thoracic aorta.  Aortic Valve: Normal trileaflet aortic valve. No aortic  valve regurgitation seen.  Left Atrium: Mildly dilated left atrium.  LA volume index =  37 cc/m2.  No left atrium or left atrial appendage  thrombus.  Left Ventricle: Normal left ventricular systolic function.  No segmental wall motion abnormalities. Normal left  ventricular internal dimensions and wall thicknesses.  Right Heart: Normal right atrium. Normal right ventricular  size and function. Normal tricuspid valve.  Mild tricuspid  regurgitation. Normal pulmonic valve. Minimalpulmonic  regurgitation.  Pericardium/PleuraNormal pericardium with no pericardial  effusion.  Hemodynamic: Inadequate tricuspid regurgitation Doppler  envelope precludes estimation of RVSP. Contrast injection  demonstrates no evidence of a patent foramen ovale.  ------------------------------------------------------------------------  CONCLUSIONS:  1. Thickened mitral leaflets with mitral valve prolapse  involving the middle (A2) segment of the anterior mitral  leaflet. Severe mitral regurgitation with an eccentric,  posteriorly directed jet.  Vena contracta width about  0.8  cm.  Systolic reversal of flow seen in the left superior  pulmonary vein.  Estimated regurgitant volume about  100 mL  (by the volumetric method).  2. Normal trileafletaortic valve. No aortic valve  regurgitation seen.  3. Normal aortic root, aortic arch, and descending thoracic  aorta.  4. Mildly dilated left atrium.  LA volume index = 37 cc/m2.   No left atrium or left atrial appendage thrombus.  5. Normal left ventricular internal dimensions and wall  thicknesses.  6. Normal left ventricular systolic function. No segmental  wall motion abnormalities.  7. Normal right ventricular size and function.  8. Inadequate tricuspid regurgitation Doppler envelope  precludes estimation of RVSP.  9. Contrast injection demonstrates no evidence of a patent  foramen ovale.  ------------------------------------------------------------------------  Confirmed on  10/27/2016 - 07:32:03 by Alan Martin M.D.  ------------------------------------------------------------------------      EXAM:  CT BRAIN        PROCEDURE DATE:  Jun 19 2017     INTERPRETATION:  HISTORY: Fall from standing. Not talking.    Technique: CT of the head was performed without contrast.    Multiple contiguous axial images were acquired from the skullbaseto the   vertex without the administration of intravenous contrast.  Coronal and   sagittal reformations were made.    COMPARISON: CT head dated 6/18/2017.    FINDINGS:  The ventricles and sulci are within normal limits. There are no areas of   abnormal attenuation within the brain parenchyma. There is no   intraparenchymal hematoma, mass effect or midline shift. No abnormal   extra-axial fluid collections are present. There is no evidence of acute   transcortical territorial infarction.    The calvarium is intact. The visualized intraorbital compartments,   paranasal sinuses and mastoid complexes appear free of acute disease.    IMPRESSION:  No acute intracranial hemorrhage.  No evidence for acute transcortical territorial infarction.      CHEO OTERO M.D., ATTENDING RADIOLOGIST  This document has been electronically signed. Jun 19 2017 11:48AM

## 2017-06-25 LAB
APTT BLD: 69.5 SEC — HIGH (ref 27.5–37.4)
BUN SERPL-MCNC: 21 MG/DL — SIGNIFICANT CHANGE UP (ref 7–23)
CALCIUM SERPL-MCNC: 9.5 MG/DL — SIGNIFICANT CHANGE UP (ref 8.4–10.5)
CHLORIDE SERPL-SCNC: 101 MMOL/L — SIGNIFICANT CHANGE UP (ref 98–107)
CO2 SERPL-SCNC: 20 MMOL/L — LOW (ref 22–31)
CREAT SERPL-MCNC: 0.66 MG/DL — SIGNIFICANT CHANGE UP (ref 0.5–1.3)
GLUCOSE SERPL-MCNC: 107 MG/DL — HIGH (ref 70–99)
HCT VFR BLD CALC: 29.2 % — LOW (ref 34.5–45)
HGB BLD-MCNC: 9.1 G/DL — LOW (ref 11.5–15.5)
INR BLD: 1.54 — HIGH (ref 0.88–1.17)
MAGNESIUM SERPL-MCNC: 1.9 MG/DL — SIGNIFICANT CHANGE UP (ref 1.6–2.6)
MCHC RBC-ENTMCNC: 25.4 PG — LOW (ref 27–34)
MCHC RBC-ENTMCNC: 31.2 % — LOW (ref 32–36)
MCV RBC AUTO: 81.6 FL — SIGNIFICANT CHANGE UP (ref 80–100)
PHOSPHATE SERPL-MCNC: 4.1 MG/DL — SIGNIFICANT CHANGE UP (ref 2.5–4.5)
PLATELET # BLD AUTO: 178 K/UL — SIGNIFICANT CHANGE UP (ref 150–400)
PMV BLD: 10.6 FL — SIGNIFICANT CHANGE UP (ref 7–13)
POTASSIUM SERPL-MCNC: 4.1 MMOL/L — SIGNIFICANT CHANGE UP (ref 3.5–5.3)
POTASSIUM SERPL-SCNC: 4.1 MMOL/L — SIGNIFICANT CHANGE UP (ref 3.5–5.3)
PROTHROM AB SERPL-ACNC: 17.4 SEC — HIGH (ref 9.8–13.1)
RBC # BLD: 3.58 M/UL — LOW (ref 3.8–5.2)
RBC # FLD: 14.4 % — SIGNIFICANT CHANGE UP (ref 10.3–14.5)
SODIUM SERPL-SCNC: 136 MMOL/L — SIGNIFICANT CHANGE UP (ref 135–145)
WBC # BLD: 8.12 K/UL — SIGNIFICANT CHANGE UP (ref 3.8–10.5)
WBC # FLD AUTO: 8.12 K/UL — SIGNIFICANT CHANGE UP (ref 3.8–10.5)

## 2017-06-25 PROCEDURE — 99232 SBSQ HOSP IP/OBS MODERATE 35: CPT

## 2017-06-25 PROCEDURE — 99233 SBSQ HOSP IP/OBS HIGH 50: CPT

## 2017-06-25 RX ORDER — WARFARIN SODIUM 2.5 MG/1
7.5 TABLET ORAL ONCE
Qty: 0 | Refills: 0 | Status: COMPLETED | OUTPATIENT
Start: 2017-06-25 | End: 2017-06-25

## 2017-06-25 RX ADMIN — ATORVASTATIN CALCIUM 40 MILLIGRAM(S): 80 TABLET, FILM COATED ORAL at 22:09

## 2017-06-25 RX ADMIN — Medication 650 MILLIGRAM(S): at 23:00

## 2017-06-25 RX ADMIN — Medication 100 MILLIGRAM(S): at 13:02

## 2017-06-25 RX ADMIN — Medication 100 MILLIGRAM(S): at 05:40

## 2017-06-25 RX ADMIN — Medication 650 MILLIGRAM(S): at 14:00

## 2017-06-25 RX ADMIN — Medication 650 MILLIGRAM(S): at 22:08

## 2017-06-25 RX ADMIN — WARFARIN SODIUM 7.5 MILLIGRAM(S): 2.5 TABLET ORAL at 18:23

## 2017-06-25 RX ADMIN — Medication 100 MILLIGRAM(S): at 22:09

## 2017-06-25 RX ADMIN — Medication 650 MILLIGRAM(S): at 13:02

## 2017-06-25 RX ADMIN — SENNA PLUS 2 TABLET(S): 8.6 TABLET ORAL at 13:04

## 2017-06-25 RX ADMIN — Medication 325 MILLIGRAM(S): at 13:02

## 2017-06-25 NOTE — PROGRESS NOTE ADULT - ASSESSMENT
Assessment and Plan:   38 y/o female with a PMHx of severe MR S/P MVR at [facility] on 6/7 and discharged on 6/14 (discharged with a subtherapeutic INR on a Lovenox to Coumadin bridge but wasn't educated/didnt understand & never started coumadin), HTN, and HLD presents to ED with aphasia concerning for CVA now with acute worsening of baseline thrombocytopenia, HIT screen positive , NADINE pending, remains therapeutic on argatroban, plt improving.   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     thrombocytopenia 2/2 heparin;  HIT screen +;   Hep serologies and HIV negative (as other causes of thrombocytopenia); also no s/s of infection  -f/u NADINE  today platelet is 178 , start on coumadin    c/w argatroban and add warfarin until INR is greater than 4 (on combined treatment), then discontinue argatroban gtt. 4 hours later, repeat INR to check for therapeutic coumadin levels (goal here is 2.5-3.5 given mechanical valve). If therapeutic, permanently discontinue argatroban. If subtherapeutic, restart argatroban and repeat process again when INR is higher on combined therapy (i.e, 4.5)  -Given mechanical MVR, pt will require indefinite anticoagulationthrombocytopenia 2/2 heparin; 58-->97-->112-->123 since changing to argatroban (per d/w OP CT surgeon office pre-op plt 265), no s/s of thrombosis, HIT screen +; Hep serologies and HIV negative (as other causes of thrombocytopenia); also no s/s of infecton  -f/u NADINE  -per heme NO COUMADIN until plt >150; appreciate heme c/s c/w argatroban per their note: Continue argatroban and warfarin administration until INR is greater than 4 (on combined treatment), then discontinue argatroban gtt. 4 hours later, repeat INR to check for therapeutic coumadin levels (goal here is 2.5-3.5 given mechanical valve). If therapeutic, permanently discontinue argatroban. If subtherapeutic, restart argatroban and repeat process again when INR is higher on combined therapy (i.e, 4.5)  -Given mechanical MVR, pt will require indefinite anticoagulation2.: Aphasia.   possible stroke on  argatroban gtt   INR goal 2.5-3.5, restart coumadin  3. MR (mitral regurgitation).     s/p robotic repair with mechanical valve  continue wound care   f/u with cardio  need repeat echo after 1 week   can be done as out patient   4. Anemia.  likely 2/2 post op status also hemolysis 2/2 valve  can start iron  transfuse <7  likely mild hemorrhoidal bleeding, monitor, bowel regimen.   5. HTN (hypertension).  monitor BP  6.HLD (hyperlipidemia).   continue statin

## 2017-06-25 NOTE — PROGRESS NOTE ADULT - SUBJECTIVE AND OBJECTIVE BOX
Cardiology/Vascular Medicine Inpatient Progress Note    Asked by Dr. Davis to see patient re: anticoagulation management in setting of recent MVR.    Preliminary review of TTE: mobile components noted on chordae.  May represent residual surgical material or less likely thrombus. Doubt vegetation as patient has no symptoms or findings to support endocarditis.  Dr. Zimmer spoke with surgeon who performed valve replacement who thought this may be surgical material.  Will defer EVELINA and recommend surveillance TTE in 1 week to re-evaluate.  This can be performed with her outpatient cardiologist.  +Groin wound dehiscence--wound care consult ongoing.    Continuing with argatroban and eventual anticoagulation with warfarin.    Vital Signs Last 24 Hrs  T(C): 36.7, Max: 36.7 (06-25 @ 05:12)  T(F): 98, Max: 98 (06-25 @ 05:12)  HR: 80 (80 - 95)  BP: 129/65 (117/73 - 129/65)  BP(mean): --  RR: 18 (18 - 18)  SpO2: 100% (100% - 100%)    Appearance: Normal	  HEENT:   Normal oral mucosa, PERRL, EOMI	  Lymphatic: No lymphadenopathy  Cardiovascular: Normal S1 S2, No JVD, No murmurs, +crisp click, No edema  Respiratory: Lungs clear to auscultation	  Psychiatry: A & O x 3, Mood & affect appropriate  Gastrointestinal:  Soft, Non-tender, + BS	  Skin: No rashes, No ecchymoses, No cyanosis	  Neurologic: Non-focal  Extremities: Normal range of motion, No clubbing, cyanosis or edema  Vascular: Peripheral pulses palpable 2+ bilaterally    MEDICATIONS  (STANDING):  atorvastatin 40milliGRAM(s) Oral at bedtime  ferrous    sulfate 325milliGRAM(s) Oral daily  argatroban Infusion 2MICROgram(s)/kG/Min IV Continuous <Continuous>  docusate sodium 100milliGRAM(s) Oral three times a day    MEDICATIONS  (PRN):  acetaminophen   Tablet. 650milliGRAM(s) Oral every 6 hours PRN Mild Pain (1 - 3)  senna 2Tablet(s) Oral at bedtime PRN Constipation        LABS:	 	                          8.4    7.97  )-----------( 148      ( 24 Jun 2017 05:30 )             27.8     06-24    137  |  101  |  18  ----------------------------<  96  4.0   |  16<L>  |  0.68    Ca    9.6      24 Jun 2017 05:30  Phos  4.5     06-24  Mg     1.8     06-24      PT/INR - ( 24 Jun 2017 05:30 )   PT: 17.5 SEC;   INR: 1.55          PTT - ( 24 Jun 2017 05:30 )  PTT:66.9 SEC  Patient name: ДМИТРИЙ ALY  YOB: 1980   Age: 36 (F)   MR#: [id number]  Study Date: 10/26/2016  Location: O/PSonographer: Lauren Finkelstein  [facility] Sonographer: Alan Martin MD  Study quality: Technically good  Referring Physician: Doron Plaza MD  Blood Pressure: 122/82 mmHg  Height: 150 cm  Weight: 80 kg  BSA: 1.8 m2  ------------------------------------------------------------------------  PROCEDURE: Transesophageal and transthoracic  echocardiograms with 2-D, M-Mode and complete spectral and  color flow Doppler were performed in the echocardiography  laboratory.  Informed consent was first obtained for EVELINA.  The patient was sedated by Anesthesia.  The procedure was  monitored with automatic blood pressure monitoring, ECG  tracings and pulse oximetry.  Gag reflex was abolished with  topical Cetacaine and the transesophageal probe was placed  in the esophagus posterior to the heart without  complications.  The patient tolerated the procedure well.  Real-time and reconstructed 3-dimensional imaging was  performed.  Color Doppler analysis was carried out using  both 2D and 3D mapping.  Patient was injected with 10 cc's of aerosolized saline.  INDICATION: Nonrheumatic mitral (valve) insufficiency  (I34.0)  ------------------------------------------------------------------------  DIMENSIONS:  Dimensions:     Normal Values:  LA:     4.2 cm    2.0 - 4.0 cm  Ao:     2.5 cm    2.0 - 3.8 cm  SEPTUM: 0.8 cm    0.6 - 1.2 cm  PWT:    0.8 cm    0.6 - 1.1 cm  LVIDd:  4.7 cm 3.0 - 5.6 cm  LVIDs:  3.0 cm    1.8 - 4.0 cm  Derived Variables:  LVMI: 70 g/m2  RWT: 0.34  Fractional short: 36 %  Ejection Fraction (Teicholtz): 66 %  ------------------------------------------------------------------------  OBSERVATIONS:  Mitral Valve: Thickened mitral leaflets with mitral valve  prolapse involving the middle (A2) segment of the anterior  mitral leaflet. Severe mitral regurgitation with an  eccentric, posteriorly directed jet.  Vena contracta width  about  0.8 cm.  Systolic reversal of flow seen in the left  superior pulmonary vein.  Estimated regurgitant volume  about  100 mL (by the volumetric method).  Aortic Root: Normal aortic root, aortic arch, and  descending thoracic aorta.  Aortic Valve: Normal trileaflet aortic valve. No aortic  valve regurgitation seen.  Left Atrium: Mildly dilated left atrium.  LA volume index =  37 cc/m2.  No left atrium or left atrial appendage  thrombus.  Left Ventricle: Normal left ventricular systolic function.  No segmental wall motion abnormalities. Normal left  ventricular internal dimensions and wall thicknesses.  Right Heart: Normal right atrium. Normal right ventricular  size and function. Normal tricuspid valve.  Mild tricuspid  regurgitation. Normal pulmonic valve. Minimalpulmonic  regurgitation.  Pericardium/PleuraNormal pericardium with no pericardial  effusion.  Hemodynamic: Inadequate tricuspid regurgitation Doppler  envelope precludes estimation of RVSP. Contrast injection  demonstrates no evidence of a patent foramen ovale.  ------------------------------------------------------------------------  CONCLUSIONS:  1. Thickened mitral leaflets with mitral valve prolapse  involving the middle (A2) segment of the anterior mitral  leaflet. Severe mitral regurgitation with an eccentric,  posteriorly directed jet.  Vena contracta width about  0.8  cm.  Systolic reversal of flow seen in the left superior  pulmonary vein.  Estimated regurgitant volume about  100 mL  (by the volumetric method).  2. Normal trileafletaortic valve. No aortic valve  regurgitation seen.  3. Normal aortic root, aortic arch, and descending thoracic  aorta.  4. Mildly dilated left atrium.  LA volume index = 37 cc/m2.   No left atrium or left atrial appendage thrombus.  5. Normal left ventricular internal dimensions and wall  thicknesses.  6. Normal left ventricular systolic function. No segmental  wall motion abnormalities.  7. Normal right ventricular size and function.  8. Inadequate tricuspid regurgitation Doppler envelope  precludes estimation of RVSP.  9. Contrast injection demonstrates no evidence of a patent  foramen ovale.  ------------------------------------------------------------------------  Confirmed on  10/27/2016 - 07:32:03 by Alan Martin M.D.  ------------------------------------------------------------------------      EXAM:  CT BRAIN        PROCEDURE DATE:  Jun 19 2017     INTERPRETATION:  HISTORY: Fall from standing. Not talking.    Technique: CT of the head was performed without contrast.    Multiple contiguous axial images were acquired from the skullbaseto the   vertex without the administration of intravenous contrast.  Coronal and   sagittal reformations were made.    COMPARISON: CT head dated 6/18/2017.    FINDINGS:  The ventricles and sulci are within normal limits. There are no areas of   abnormal attenuation within the brain parenchyma. There is no   intraparenchymal hematoma, mass effect or midline shift. No abnormal   extra-axial fluid collections are present. There is no evidence of acute   transcortical territorial infarction.    The calvarium is intact. The visualized intraorbital compartments,   paranasal sinuses and mastoid complexes appear free of acute disease.    IMPRESSION:  No acute intracranial hemorrhage.  No evidence for acute transcortical territorial infarction.      CHEO OTERO M.D., ATTENDING RADIOLOGIST  This document has been electronically signed. Jun 19 2017 11:48AM

## 2017-06-25 NOTE — PROGRESS NOTE ADULT - SUBJECTIVE AND OBJECTIVE BOX
37y old  Female who presents with a chief complaint of Aphasia    SUBJECTIVE / OVERNIGHT EVENTS:  patient seen and examine at bed side   no acute issue over nights  patient want to go home and very anxious     MEDICATIONS  (STANDING):  atorvastatin 40milliGRAM(s) Oral at bedtime  ferrous    sulfate 325milliGRAM(s) Oral daily  argatroban Infusion 2MICROgram(s)/kG/Min IV Continuous <Continuous>  docusate sodium 100milliGRAM(s) Oral three times a day  warfarin 7.5milliGRAM(s) Oral once    MEDICATIONS  (PRN):  acetaminophen   Tablet. 650milliGRAM(s) Oral every 6 hours PRN Mild Pain (1 - 3)  senna 2Tablet(s) Oral at bedtime PRN Constipation    Vital Signs Last 24 Hrs  T(C): 36.7, Max: 36.7 (06-25 @ 05:12)  T(F): 98, Max: 98 (06-25 @ 05:12)  HR: 80 (80 - 95)  BP: 129/65 (117/73 - 129/65)  BP(mean): --  RR: 18 (18 - 18)  SpO2: 100% (100% - 100%)    PHYSICAL EXAM:  GENERAL: NAD, well-developed  HEAD:  Atraumatic, Normocephalic  EYES: EOMI, PERRLA, conjunctiva and sclera clear  NECK: Supple, No JVD  CHEST/LUNG: Clear to auscultation bilaterally; No wheeze  HEART: Regular, +click loudest in MV area   ABDOMEN: Soft, Nontender, Nondistended; Bowel sounds present  EXTREMITIES:   warm and well perfused, No clubbing, cyanosis, or edema  PSYCH: AAOx3  NEUROLOGY: non-focal  SKIN: R chest wall surgical incisions induration but no fluctuance, one slightly open without e/o infection, R groin incision with sq induration, no fluctuance, L groin wound appears to be dehisced with no purulence., + granulation sutures visible                           9.1    8.12  )-----------( 178      ( 25 Jun 2017 06:35 )             29.2       06-25    136  |  101  |  21  ----------------------------<  107<H>  4.1   |  20<L>  |  0.66    Ca    9.5      25 Jun 2017 06:35  Phos  4.1     06-25  Mg     1.9     06-25

## 2017-06-26 LAB
APTT BLD: 64.4 SEC — HIGH (ref 27.5–37.4)
BUN SERPL-MCNC: 22 MG/DL — SIGNIFICANT CHANGE UP (ref 7–23)
BUN SERPL-MCNC: 22 MG/DL — SIGNIFICANT CHANGE UP (ref 7–23)
CALCIUM SERPL-MCNC: 9.7 MG/DL — SIGNIFICANT CHANGE UP (ref 8.4–10.5)
CALCIUM SERPL-MCNC: 9.7 MG/DL — SIGNIFICANT CHANGE UP (ref 8.4–10.5)
CHLORIDE SERPL-SCNC: 98 MMOL/L — SIGNIFICANT CHANGE UP (ref 98–107)
CHLORIDE SERPL-SCNC: 98 MMOL/L — SIGNIFICANT CHANGE UP (ref 98–107)
CO2 SERPL-SCNC: 20 MMOL/L — LOW (ref 22–31)
CO2 SERPL-SCNC: 20 MMOL/L — LOW (ref 22–31)
CREAT SERPL-MCNC: 0.63 MG/DL — SIGNIFICANT CHANGE UP (ref 0.5–1.3)
CREAT SERPL-MCNC: 0.63 MG/DL — SIGNIFICANT CHANGE UP (ref 0.5–1.3)
GLUCOSE SERPL-MCNC: 101 MG/DL — HIGH (ref 70–99)
GLUCOSE SERPL-MCNC: 101 MG/DL — HIGH (ref 70–99)
HCT VFR BLD CALC: 28.9 % — LOW (ref 34.5–45)
HGB BLD-MCNC: 9 G/DL — LOW (ref 11.5–15.5)
INR BLD: 1.52 — HIGH (ref 0.88–1.17)
MAGNESIUM SERPL-MCNC: 1.6 MG/DL — SIGNIFICANT CHANGE UP (ref 1.6–2.6)
MCHC RBC-ENTMCNC: 25.4 PG — LOW (ref 27–34)
MCHC RBC-ENTMCNC: 31.1 % — LOW (ref 32–36)
MCV RBC AUTO: 81.4 FL — SIGNIFICANT CHANGE UP (ref 80–100)
PHOSPHATE SERPL-MCNC: 4.8 MG/DL — HIGH (ref 2.5–4.5)
PLATELET # BLD AUTO: 197 K/UL — SIGNIFICANT CHANGE UP (ref 150–400)
PMV BLD: 10.6 FL — SIGNIFICANT CHANGE UP (ref 7–13)
POTASSIUM SERPL-MCNC: 4.1 MMOL/L — SIGNIFICANT CHANGE UP (ref 3.5–5.3)
POTASSIUM SERPL-MCNC: 4.1 MMOL/L — SIGNIFICANT CHANGE UP (ref 3.5–5.3)
POTASSIUM SERPL-SCNC: 4.1 MMOL/L — SIGNIFICANT CHANGE UP (ref 3.5–5.3)
POTASSIUM SERPL-SCNC: 4.1 MMOL/L — SIGNIFICANT CHANGE UP (ref 3.5–5.3)
PROTHROM AB SERPL-ACNC: 17.2 SEC — HIGH (ref 9.8–13.1)
RBC # BLD: 3.55 M/UL — LOW (ref 3.8–5.2)
RBC # FLD: 14.3 % — SIGNIFICANT CHANGE UP (ref 10.3–14.5)
SODIUM SERPL-SCNC: 137 MMOL/L — SIGNIFICANT CHANGE UP (ref 135–145)
SODIUM SERPL-SCNC: 137 MMOL/L — SIGNIFICANT CHANGE UP (ref 135–145)
SRA INTERP SER-IMP: SIGNIFICANT CHANGE UP
WBC # BLD: 7.69 K/UL — SIGNIFICANT CHANGE UP (ref 3.8–10.5)
WBC # FLD AUTO: 7.69 K/UL — SIGNIFICANT CHANGE UP (ref 3.8–10.5)

## 2017-06-26 PROCEDURE — 99232 SBSQ HOSP IP/OBS MODERATE 35: CPT

## 2017-06-26 PROCEDURE — 99233 SBSQ HOSP IP/OBS HIGH 50: CPT

## 2017-06-26 RX ORDER — MAGNESIUM OXIDE 400 MG ORAL TABLET 241.3 MG
400 TABLET ORAL
Qty: 0 | Refills: 0 | Status: DISCONTINUED | OUTPATIENT
Start: 2017-06-26 | End: 2017-06-26

## 2017-06-26 RX ORDER — WARFARIN SODIUM 2.5 MG/1
7.5 TABLET ORAL ONCE
Qty: 0 | Refills: 0 | Status: DISCONTINUED | OUTPATIENT
Start: 2017-06-26 | End: 2017-06-26

## 2017-06-26 RX ORDER — WARFARIN SODIUM 2.5 MG/1
10 TABLET ORAL ONCE
Qty: 0 | Refills: 0 | Status: DISCONTINUED | OUTPATIENT
Start: 2017-06-26 | End: 2017-06-26

## 2017-06-26 RX ORDER — WARFARIN SODIUM 2.5 MG/1
11 TABLET ORAL ONCE
Qty: 0 | Refills: 0 | Status: COMPLETED | OUTPATIENT
Start: 2017-06-26 | End: 2017-06-26

## 2017-06-26 RX ADMIN — Medication 650 MILLIGRAM(S): at 23:20

## 2017-06-26 RX ADMIN — ARGATROBAN 9.58 MICROGRAM(S)/KG/MIN: 50 INJECTION, SOLUTION INTRAVENOUS at 09:30

## 2017-06-26 RX ADMIN — Medication 325 MILLIGRAM(S): at 13:11

## 2017-06-26 RX ADMIN — Medication 100 MILLIGRAM(S): at 22:21

## 2017-06-26 RX ADMIN — SENNA PLUS 2 TABLET(S): 8.6 TABLET ORAL at 22:21

## 2017-06-26 RX ADMIN — Medication 100 MILLIGRAM(S): at 13:11

## 2017-06-26 RX ADMIN — ATORVASTATIN CALCIUM 40 MILLIGRAM(S): 80 TABLET, FILM COATED ORAL at 22:21

## 2017-06-26 RX ADMIN — MAGNESIUM OXIDE 400 MG ORAL TABLET 400 MILLIGRAM(S): 241.3 TABLET ORAL at 09:30

## 2017-06-26 RX ADMIN — Medication 100 MILLIGRAM(S): at 05:31

## 2017-06-26 RX ADMIN — MAGNESIUM OXIDE 400 MG ORAL TABLET 400 MILLIGRAM(S): 241.3 TABLET ORAL at 13:11

## 2017-06-26 RX ADMIN — Medication 650 MILLIGRAM(S): at 22:21

## 2017-06-26 RX ADMIN — WARFARIN SODIUM 11 MILLIGRAM(S): 2.5 TABLET ORAL at 17:59

## 2017-06-26 NOTE — PROGRESS NOTE ADULT - PROBLEM SELECTOR PLAN 1
F/U echocardiogram in one week.    Continue argatroban gtt as per protocol.  Platelet count improved..      Eventual anticoagulation with warfarin for INR goal 2.5 to 3.5 if patient is stable without evidence of bleeding in setting of thrombocytopenia. Can f/u outside cardiologist and PMD.    Continue argatroban gtt as per protocol.  Anticoagulation with warfarin for INR goal 2.5 to 3.5 if patient is stable without evidence of bleeding in setting of thrombocytopenia.

## 2017-06-26 NOTE — PROGRESS NOTE ADULT - ASSESSMENT
Probably acute embolic stroke in setting of recent MVR.  Mobile components noted attached to chordae on TTE.  As discussed above, low suspicion for endocarditis.  If thrombus, it is small and can be monitored for now; patient also on anticoagulation.  Most probable surgical material as per surgeon.  Will recommend follow-up echocardiogram in one week. Unclear etiology for recent neurologic complaints, as two recent head CTs did not identify acute stroke (unable to obtain MRI because of mechanical valve).  Mobile components noted attached to chordae on TTE.  As discussed above, low suspicion for endocarditis.  If thrombus, it is small and can be monitored for now; patient also on anticoagulation.  Most probable surgical material as per surgeon.  Continue anticoagulation with argatroban and warfarin for INR goal 2.5 to 3.5.  Outpatient f/u with PMD.

## 2017-06-26 NOTE — PROGRESS NOTE ADULT - SUBJECTIVE AND OBJECTIVE BOX
Patient is a 37y old  Female who presents with a chief complaint of Aphasia    SUBJECTIVE / OVERNIGHT EVENTS:     Patient denies new complaints. Patient requesting to leave.  Asking why INR is lower.   Explained that we were unable to further dose coumadin until plt >150 which occurred on Sunday.  She was given 7.5 coumadin last night.  She asked me to speak with her  re: this.  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  Patient  requesting an alternative medication.  Advised that there are no real alternatives.   Called both hematology and her OP CT surgeon office who both recommend against discharge without INR 2.5   states she is driving him crazy and only wants to leave the hospital.      MEDICATIONS  (STANDING):  atorvastatin 40milliGRAM(s) Oral at bedtime  ferrous    sulfate 325milliGRAM(s) Oral daily  argatroban Infusion 2MICROgram(s)/kG/Min IV Continuous <Continuous>  docusate sodium 100milliGRAM(s) Oral three times a day  magnesium oxide 400milliGRAM(s) Oral three times a day with meals  warfarin 11milliGRAM(s) Oral once    MEDICATIONS  (PRN):  acetaminophen   Tablet. 650milliGRAM(s) Oral every 6 hours PRN Mild Pain (1 - 3)  senna 2Tablet(s) Oral at bedtime PRN Constipation      Vital Signs Last 24 Hrs  T(C): 36.7, Max: 37.3 (06-25 @ 21:55)  HR: 86 (86 - 100)  BP: 123/74 (110/62 - 123/74)  RR: 17 (17 - 18)  SpO2: 100% (98% - 100%)  Wt(kg): --    PHYSICAL EXAM:  GENERAL: NAD, well-developed  HEAD:  Atraumatic, Normocephalic  EYES: EOMI, PERRLA, conjunctiva and sclera clear  NECK: Supple, No JVD  CHEST/LUNG: Clear to auscultation bilaterally; No wheeze  HEART: Regular, +click loudest in MV area   ABDOMEN: Soft, Nontender, Nondistended; Bowel sounds present  EXTREMITIES:   warm and well perfused, No clubbing, cyanosis, or edema  PSYCH: AAOx3  NEUROLOGY: non-focal  SKIN: R chest wall surgical incisions induration but no fluctuance, one slightly open without e/o infection, R groin incision with sq induration, no fluctuance, L groin wound appears to be dehisced with no purulence., + granulation tissue       LABS:                        9.0    7.69  )-----------( 197      ( 26 Jun 2017 06:00 )             28.9     06-26    137  |  98  |  22  ----------------------------<  101<H>  4.1   |  20<L>  |  0.63    Ca    9.7      26 Jun 2017 06:00  Phos  4.8     06-26  Mg     1.6     06-26      PT/INR - ( 26 Jun 2017 06:00 )   PT: 17.2 SEC;   INR: 1.52          PTT - ( 26 Jun 2017 06:00 )  PTT:64.4 SEC          Consultant(s) Notes Reviewed:  cards    Care Discussed with Consultants/Other Providers: cards, heme

## 2017-06-26 NOTE — PROGRESS NOTE ADULT - PROBLEM SELECTOR PLAN 1
thrombocytopenia 2/2 heparin; 58-->97-->112-->123-->148-->178-->197 since changing to argatroban (per d/w OP CT surgeon office pre-op plt 265), no s/s of thrombosis, HIT screen + and NADINE +  -appreciate heme c/s c/w argatroban per their note: Once plt >150 with c/w argatroban and add warfarin until INR is greater than 4 (on combined treatment), then discontinue argatroban gtt. 4 hours later, repeat INR to check for therapeutic coumadin levels (goal here is 2.5-3.5 given mechanical valve). If therapeutic, permanently discontinue argatroban. If subtherapeutic, restart argatroban and repeat process again when INR is higher on combined therapy (i.e, 4.5), coumadin 11 mg today  -Given mechanical MVR, pt will require indefinite anticoagulation

## 2017-06-26 NOTE — PROGRESS NOTE ADULT - SUBJECTIVE AND OBJECTIVE BOX
Cardiology/Vascular Medicine Inpatient Progress Note    Asked by Dr. Davis to see patient re: anticoagulation management in setting of recent MVR.    Preliminary review of TTE: mobile components noted on chordae.  May represent residual surgical material or less likely thrombus. Doubt vegetation as patient has no symptoms or findings to support endocarditis.  Dr. Zimmer spoke with surgeon who performed valve replacement who thought this may be surgical material.  Will defer EVELINA and recommend surveillance TTE in 1 week to re-evaluate.  This can be performed with her outpatient cardiologist.  +Groin wound dehiscence--wound care consult ongoing.    Continuing with argatroban and eventual anticoagulation with warfarin.    Vital Signs Last 24 Hrs  T(C): 36.4, Max: 37.3 (06-25 @ 21:55)  T(F): 97.6, Max: 99.1 (06-25 @ 21:55)  HR: 94 (94 - 100)  BP: 110/62 (110/62 - 122/71)  BP(mean): --  RR: 18 (18 - 18)  SpO2: 98% (98% - 99%)    Appearance: Normal	  HEENT:   Normal oral mucosa, PERRL, EOMI	  Lymphatic: No lymphadenopathy  Cardiovascular: Normal S1 S2, No JVD, No murmurs, +crisp click, No edema  Respiratory: Lungs clear to auscultation	  Psychiatry: A & O x 3, Mood & affect appropriate  Gastrointestinal:  Soft, Non-tender, + BS	  Skin: No rashes, No ecchymoses, No cyanosis	  Neurologic: Non-focal  Extremities: Normal range of motion, No clubbing, cyanosis or edema  Vascular: Peripheral pulses palpable 2+ bilaterally    MEDICATIONS  (STANDING):  atorvastatin 40milliGRAM(s) Oral at bedtime  ferrous    sulfate 325milliGRAM(s) Oral daily  argatroban Infusion 2MICROgram(s)/kG/Min IV Continuous <Continuous>  docusate sodium 100milliGRAM(s) Oral three times a day    MEDICATIONS  (PRN):  acetaminophen   Tablet. 650milliGRAM(s) Oral every 6 hours PRN Mild Pain (1 - 3)  senna 2Tablet(s) Oral at bedtime PRN Constipation      LABS:	 	                          9.0    7.69  )-----------( 197      ( 26 Jun 2017 06:00 )             28.9     06-25    136  |  101  |  21  ----------------------------<  107<H>  4.1   |  20<L>  |  0.66    Ca    9.5      25 Jun 2017 06:35  Phos  4.1     06-25  Mg     1.9     06-25          Patient name: ДМИТРИЙ ALY  YOB: 1980   Age: 36 (F)   MR#: [id number]  Study Date: 10/26/2016  Location: O/PSonographer: Lauren Finkelstein  2nd Sonographer: Alan Martin MD  Study quality: Technically good  Referring Physician: Doron Plaza MD  Blood Pressure: 122/82 mmHg  Height: 150 cm  Weight: 80 kg  BSA: 1.8 m2  ------------------------------------------------------------------------  PROCEDURE: Transesophageal and transthoracic  echocardiograms with 2-D, M-Mode and complete spectral and  color flow Doppler were performed in the echocardiography  laboratory.  Informed consent was first obtained for EVELINA.  The patient was sedated by Anesthesia.  The procedure was  monitored with automatic blood pressure monitoring, ECG  tracings and pulse oximetry.  Gag reflex was abolished with  topical Cetacaine and the transesophageal probe was placed  in the esophagus posterior to the heart without  complications.  The patient tolerated the procedure well.  Real-time and reconstructed 3-dimensional imaging was  performed.  Color Doppler analysis was carried out using  both 2D and 3D mapping.  Patient was injected with 10 cc's of aerosolized saline.  INDICATION: Nonrheumatic mitral (valve) insufficiency  (I34.0)  ------------------------------------------------------------------------  DIMENSIONS:  Dimensions:     Normal Values:  LA:     4.2 cm    2.0 - 4.0 cm  Ao:     2.5 cm    2.0 - 3.8 cm  SEPTUM: 0.8 cm    0.6 - 1.2 cm  PWT:    0.8 cm    0.6 - 1.1 cm  LVIDd:  4.7 cm 3.0 - 5.6 cm  LVIDs:  3.0 cm    1.8 - 4.0 cm  Derived Variables:  LVMI: 70 g/m2  RWT: 0.34  Fractional short: 36 %  Ejection Fraction (Teicholtz): 66 %  ------------------------------------------------------------------------  OBSERVATIONS:  Mitral Valve: Thickened mitral leaflets with mitral valve  prolapse involving the middle (A2) segment of the anterior  mitral leaflet. Severe mitral regurgitation with an  eccentric, posteriorly directed jet.  Vena contracta width  about  0.8 cm.  Systolic reversal of flow seen in the left  superior pulmonary vein.  Estimated regurgitant volume  about  100 mL (by the volumetric method).  Aortic Root: Normal aortic root, aortic arch, and  descending thoracic aorta.  Aortic Valve: Normal trileaflet aortic valve. No aortic  valve regurgitation seen.  Left Atrium: Mildly dilated left atrium.  LA volume index =  37 cc/m2.  No left atrium or left atrial appendage  thrombus.  Left Ventricle: Normal left ventricular systolic function.  No segmental wall motion abnormalities. Normal left  ventricular internal dimensions and wall thicknesses.  Right Heart: Normal right atrium. Normal right ventricular  size and function. Normal tricuspid valve.  Mild tricuspid  regurgitation. Normal pulmonic valve. Minimalpulmonic  regurgitation.  Pericardium/PleuraNormal pericardium with no pericardial  effusion.  Hemodynamic: Inadequate tricuspid regurgitation Doppler  envelope precludes estimation of RVSP. Contrast injection  demonstrates no evidence of a patent foramen ovale.  ------------------------------------------------------------------------  CONCLUSIONS:  1. Thickened mitral leaflets with mitral valve prolapse  involving the middle (A2) segment of the anterior mitral  leaflet. Severe mitral regurgitation with an eccentric,  posteriorly directed jet.  Vena contracta width about  0.8  cm.  Systolic reversal of flow seen in the left superior  pulmonary vein.  Estimated regurgitant volume about  100 mL  (by the volumetric method).  2. Normal trileafletaortic valve. No aortic valve  regurgitation seen.  3. Normal aortic root, aortic arch, and descending thoracic  aorta.  4. Mildly dilated left atrium.  LA volume index = 37 cc/m2.   No left atrium or left atrial appendage thrombus.  5. Normal left ventricular internal dimensions and wall  thicknesses.  6. Normal left ventricular systolic function. No segmental  wall motion abnormalities.  7. Normal right ventricular size and function.  8. Inadequate tricuspid regurgitation Doppler envelope  precludes estimation of RVSP.  9. Contrast injection demonstrates no evidence of a patent  foramen ovale.  ------------------------------------------------------------------------  Confirmed on  10/27/2016 - 07:32:03 by Alan Martin M.D.  ------------------------------------------------------------------------      EXAM:  CT BRAIN        PROCEDURE DATE:  Jun 19 2017     INTERPRETATION:  HISTORY: Fall from standing. Not talking.    Technique: CT of the head was performed without contrast.    Multiple contiguous axial images were acquired from the skullbaseto the   vertex without the administration of intravenous contrast.  Coronal and   sagittal reformations were made.    COMPARISON: CT head dated 6/18/2017.    FINDINGS:  The ventricles and sulci are within normal limits. There are no areas of   abnormal attenuation within the brain parenchyma. There is no   intraparenchymal hematoma, mass effect or midline shift. No abnormal   extra-axial fluid collections are present. There is no evidence of acute   transcortical territorial infarction.    The calvarium is intact. The visualized intraorbital compartments,   paranasal sinuses and mastoid complexes appear free of acute disease.    IMPRESSION:  No acute intracranial hemorrhage.  No evidence for acute transcortical territorial infarction.      CHEO OTERO M.D., ATTENDING RADIOLOGIST  This document has been electronically signed. Jun 19 2017 11:48AM Cardiology/Vascular Medicine Inpatient Progress Note    Asked by Dr. Davis to see patient re: anticoagulation management in setting of recent MVR.    Patient wants to go home.    Continuing with argatroban and eventual anticoagulation with warfarin.    PT/INR - ( 26 Jun 2017 06:00 )   PT: 17.2 SEC;   INR: 1.52     PTT - ( 26 Jun 2017 06:00 )  PTT:64.4 SEC    Vital Signs Last 24 Hrs  T(C): 36.4, Max: 37.3 (06-25 @ 21:55)  T(F): 97.6, Max: 99.1 (06-25 @ 21:55)  HR: 94 (94 - 100)  BP: 110/62 (110/62 - 122/71)  BP(mean): --  RR: 18 (18 - 18)  SpO2: 98% (98% - 99%)    Appearance: Normal	  HEENT:   Normal oral mucosa, PERRL, EOMI	  Lymphatic: No lymphadenopathy  Cardiovascular: Normal S1 S2, No JVD, No murmurs, +crisp click, No edema  Respiratory: Lungs clear to auscultation	  Psychiatry: A & O x 3, Mood & affect appropriate  Gastrointestinal:  Soft, Non-tender, + BS	  Skin: No rashes, No ecchymoses, No cyanosis	  Neurologic: Non-focal  Extremities: Normal range of motion, No clubbing, cyanosis or edema  Vascular: Peripheral pulses palpable 2+ bilaterally    MEDICATIONS  (STANDING):  atorvastatin 40milliGRAM(s) Oral at bedtime  ferrous    sulfate 325milliGRAM(s) Oral daily  argatroban Infusion 2MICROgram(s)/kG/Min IV Continuous <Continuous>  docusate sodium 100milliGRAM(s) Oral three times a day    MEDICATIONS  (PRN):  acetaminophen   Tablet. 650milliGRAM(s) Oral every 6 hours PRN Mild Pain (1 - 3)  senna 2Tablet(s) Oral at bedtime PRN Constipation      LABS:	 	                          9.0    7.69  )-----------( 197      ( 26 Jun 2017 06:00 )             28.9     06-25    136  |  101  |  21  ----------------------------<  107<H>  4.1   |  20<L>  |  0.66    Ca    9.5      25 Jun 2017 06:35  Phos  4.1     06-25  Mg     1.9     06-25        Patient name: ДМИТРИЙ ALY  YOB: 1980   Age: 37 (F)   MR#: [id number]  Study Date: 6/22/2017  Location: 460D Sonographer: Sonia Craft [facility]  Study quality: Technically good  Referring Physician: Nisha Zimmer MD / Luis Malhotra MD  Blood Pressure: 118/71 mmHg  Height: 150 cm  Weight: 80 kg  BSA: 1.8 m2  ------------------------------------------------------------------------  PROCEDURE: Transthoracic echocardiogram with 2-D, M-Mode  and complete spectral and color flow Doppler.  INDICATION: Nonrheumatic mitral valve disorder, unspecified  (I34.9)  ------------------------------------------------------------------------  DIMENSIONS:  Dimensions:     Normal Values:  LA:       ---     2.0 - 4.0 cm  Ao:     2.5 cm    2.0 - 3.8 cm  SEPTUM: 0.7 cm    0.6 - 1.2 cm  PWT:    1.0 cm    0.6 - 1.1 cm  LVIDd:  4.4 cm    3.0 - 5.6 cm  LVIDs:    ---     1.8 - 4.0 cm  Derived Variables:  LVMI: 68 g/m2  RWT: 0.45  ------------------------------------------------------------------------  OBSERVATIONS:  Mitral Valve: Mechanical prosthetic mitral valve  replacement. The leaflets are not well visualized. Minimal  mitral regurgitation. Mean transmitral valve gradient  equals 4 mm Hg, which is probably normal in the setting of  a prosthetic valve.  Aortic Root: Normal aortic root.  Aortic Valve: Calcified trileaflet aortic valve with normal  opening.  Left Atrium: Normal left atrium.  LA volume index = 29  cc/m2.  Left Ventricle: Normal left ventricular systolic function.  No segmental wall motion abnormalities. Echodensity seen  within the left ventricle is probably calcified chord.  Right Heart: Normal right atrium. Normal right ventricular  size and function. Normal tricuspid valve. Mild tricuspid  regurgitation. Normal pulmonic valve. Minimal pulmonic  regurgitation.  Pericardium/PleuraNormal pericardium with no pericardial  effusion.  Hemodynamic: Estimated right ventricular systolic pressure  equals 36 mm Hg, assuming right atrial pressure equals 10  mm Hg, consistent with borderline pulmonary hypertension.  ------------------------------------------------------------------------  CONCLUSIONS:  1. Mechanical prosthetic mitral valve replacement. The  leaflets are not well visualized. Minimal mitral  regurgitation. Mean transmitral valve gradient equals 4 mm  Hg, which is probably normal in the setting of a prosthetic  valve.  2. Calcified trileaflet aortic valve with normal opening.  3. Normal left ventricular systolic function. No segmental  wall motion abnormalities. Echodensity seen within the left  ventricle is probably calcified chord.  4. Normal right ventricular size and function.  5. Estimated pulmonary artery systolic pressure equals 36  mm Hg, assuming right atrial pressure equals 10  mm Hg,  consistent with borderline pulmonary hypertension.  No obvious cardiac source of aphasia is identified, consider  EVELINA for further workup, if clinically warranted.  ------------------------------------------------------------------------  Confirmed on  6/24/2017 - 16:36:07 by RONNIE Wolff  ------------------------------------------------------------------------        EXAM:  CT BRAIN        PROCEDURE DATE:  Jun 19 2017     INTERPRETATION:  HISTORY: Fall from standing. Not talking.    Technique: CT of the head was performed without contrast.    Multiple contiguous axial images were acquired from the skullbaseto the   vertex without the administration of intravenous contrast.  Coronal and   sagittal reformations were made.    COMPARISON: CT head dated 6/18/2017.    FINDINGS:  The ventricles and sulci are within normal limits. There are no areas of   abnormal attenuation within the brain parenchyma. There is no   intraparenchymal hematoma, mass effect or midline shift. No abnormal   extra-axial fluid collections are present. There is no evidence of acute   transcortical territorial infarction.    The calvarium is intact. The visualized intraorbital compartments,   paranasal sinuses and mastoid complexes appear free of acute disease.    IMPRESSION:  No acute intracranial hemorrhage.  No evidence for acute transcortical territorial infarction.      CHEO OTERO M.D., ATTENDING RADIOLOGIST  This document has been electronically signed. Jun 19 2017 11:48AM

## 2017-06-26 NOTE — PROGRESS NOTE ADULT - PROBLEM SELECTOR PLAN 5
Started on argatroban gtt for acute drop in platelet count. HIT Ab + Started on argatroban gtt for acute drop in platelet count. HIT Ab +  INR goal 2.5 to 3.5

## 2017-06-26 NOTE — PROGRESS NOTE ADULT - ASSESSMENT
36 y/o female with a PMHx of severe MR S/P MVR at [facility] on 6/7, HTN, and HLD presents to ED with aphasia concerning for CVA now with acute worsening of baseline thrombocytopenia, HIT screen positive , NADINE positive confirming HIT, remains therapeutic on argatroban, plt improving, now on coumadin bridge.

## 2017-06-27 DIAGNOSIS — F43.20 ADJUSTMENT DISORDER, UNSPECIFIED: ICD-10-CM

## 2017-06-27 LAB
APTT BLD: 61.4 SEC — HIGH (ref 27.5–37.4)
INR BLD: 1.55 — HIGH (ref 0.88–1.17)
PROTHROM AB SERPL-ACNC: 17.5 SEC — HIGH (ref 9.8–13.1)

## 2017-06-27 PROCEDURE — 99232 SBSQ HOSP IP/OBS MODERATE 35: CPT

## 2017-06-27 PROCEDURE — 99356: CPT

## 2017-06-27 PROCEDURE — 99233 SBSQ HOSP IP/OBS HIGH 50: CPT

## 2017-06-27 PROCEDURE — 99223 1ST HOSP IP/OBS HIGH 75: CPT

## 2017-06-27 RX ORDER — WARFARIN SODIUM 2.5 MG/1
11 TABLET ORAL ONCE
Qty: 0 | Refills: 0 | Status: COMPLETED | OUTPATIENT
Start: 2017-06-27 | End: 2017-06-27

## 2017-06-27 RX ORDER — SENNA PLUS 8.6 MG/1
2 TABLET ORAL AT BEDTIME
Qty: 0 | Refills: 0 | Status: DISCONTINUED | OUTPATIENT
Start: 2017-06-27 | End: 2017-06-30

## 2017-06-27 RX ORDER — POLYETHYLENE GLYCOL 3350 17 G/17G
17 POWDER, FOR SOLUTION ORAL DAILY
Qty: 0 | Refills: 0 | Status: DISCONTINUED | OUTPATIENT
Start: 2017-06-27 | End: 2017-06-30

## 2017-06-27 RX ORDER — PHENYLEPHRINE-SHARK LIVER OIL-MINERAL OIL-PETROLATUM RECTAL OINTMENT
1 OINTMENT (GRAM) RECTAL
Qty: 0 | Refills: 0 | Status: DISCONTINUED | OUTPATIENT
Start: 2017-06-27 | End: 2017-06-30

## 2017-06-27 RX ADMIN — Medication 100 MILLIGRAM(S): at 21:46

## 2017-06-27 RX ADMIN — WARFARIN SODIUM 11 MILLIGRAM(S): 2.5 TABLET ORAL at 18:11

## 2017-06-27 RX ADMIN — PHENYLEPHRINE-SHARK LIVER OIL-MINERAL OIL-PETROLATUM RECTAL OINTMENT 1 SUPPOSITORY(S): at 16:44

## 2017-06-27 RX ADMIN — Medication 325 MILLIGRAM(S): at 12:25

## 2017-06-27 RX ADMIN — ARGATROBAN 9.58 MICROGRAM(S)/KG/MIN: 50 INJECTION, SOLUTION INTRAVENOUS at 21:39

## 2017-06-27 RX ADMIN — Medication 100 MILLIGRAM(S): at 06:02

## 2017-06-27 RX ADMIN — Medication 100 MILLIGRAM(S): at 12:27

## 2017-06-27 RX ADMIN — ARGATROBAN 9.58 MICROGRAM(S)/KG/MIN: 50 INJECTION, SOLUTION INTRAVENOUS at 07:19

## 2017-06-27 RX ADMIN — SENNA PLUS 2 TABLET(S): 8.6 TABLET ORAL at 21:46

## 2017-06-27 RX ADMIN — ATORVASTATIN CALCIUM 40 MILLIGRAM(S): 80 TABLET, FILM COATED ORAL at 21:46

## 2017-06-27 NOTE — BEHAVIORAL HEALTH ASSESSMENT NOTE - DESCRIPTION
severe MR S/P mechanical MVR at [facility] on 6/7/17 with Dr. Geri Mueller, HTN and HLD

## 2017-06-27 NOTE — DISCHARGE NOTE ADULT - MEDICATION SUMMARY - MEDICATIONS TO STOP TAKING
I will STOP taking the medications listed below when I get home from the hospital:    Aspirin Enteric Coated 81 mg oral delayed release tablet  -- 1 tab(s) by mouth once a day    fenofibrate 54 mg oral tablet  -- 1 tab(s) by mouth once a day    Lasix 40 mg oral tablet  -- 1 tab(s) by mouth once a day    Lasix 20 mg oral tablet  -- 1 tab(s) by mouth once a day    gabapentin 100 mg oral capsule  -- 1 cap(s) by mouth 3 times a day    gabapentin 100 mg oral capsule  -- 1 cap(s) by mouth 2 times a day    Lovenox 80 mg/0.8 mL injectable solution  --  injectable 2 times a day

## 2017-06-27 NOTE — PROGRESS NOTE ADULT - SUBJECTIVE AND OBJECTIVE BOX
Patient is a 37y old  Female who presents with a chief complaint of Aphasia      SUBJECTIVE / OVERNIGHT EVENTS:    No complaints.  No CP, SOB.   Just asking to leave.  Once again explained why we advise against that until INR in range.      No tele events.    MEDICATIONS  (STANDING):  atorvastatin 40 milliGRAM(s) Oral at bedtime  ferrous    sulfate 325 milliGRAM(s) Oral daily  argatroban Infusion 2 MICROgram(s)/kG/Min (9.576 mL/Hr) IV Continuous <Continuous>  docusate sodium 100 milliGRAM(s) Oral three times a day  warfarin 11 milliGRAM(s) Oral once    MEDICATIONS  (PRN):  acetaminophen   Tablet. 650 milliGRAM(s) Oral every 6 hours PRN Mild Pain (1 - 3)  senna 2 Tablet(s) Oral at bedtime PRN Constipation      Vital Signs Last 24 Hrs  T(C): 36.4 (06-27-17 @ 06:02), Max: 36.6 (06-26-17 @ 22:18)  HR: 94 (06-27-17 @ 06:02) (94 - 96)  BP: 95/57 (06-27-17 @ 06:02) (95/57 - 103/57)  RR: 18 (06-27-17 @ 06:02) (17 - 18)  SpO2: 100% (06-27-17 @ 06:02) (100% - 100%)    PHYSICAL EXAM:  GENERAL: NAD, well-developed  HEAD:  Atraumatic, Normocephalic  EYES: EOMI, PERRLA, conjunctiva and sclera clear  NECK: Supple, No JVD  CHEST/LUNG: Clear to auscultation bilaterally; No wheeze  HEART: Regular, +click loudest in MV area   ABDOMEN: Soft, Nontender, Nondistended; Bowel sounds present  EXTREMITIES:   warm and well perfused, No clubbing, cyanosis, or edema  PSYCH: AAOx3  NEUROLOGY: non-focal  SKIN: R chest wall surgical incisions induration but no fluctuance, one slightly open without e/o infection, R groin incision with sq induration, no fluctuance, L groin wound appears to be dehisced with no purulence., + granulation tissue       LABS:                        9.0    7.69  )-----------( 197      ( 26 Jun 2017 06:00 )             28.9     06-26    137  |  98  |  22  ----------------------------<  101<H>  4.1   |  20<L>  |  0.63    Ca    9.7      26 Jun 2017 06:00  Phos  4.8     06-26  Mg     1.6     06-26      PT/INR - ( 27 Jun 2017 06:09 )   PT: 17.5 SEC;   INR: 1.55          PTT - ( 27 Jun 2017 06:09 )  PTT:61.4 SEC      Consultant(s) Notes Reviewed:  cardiology, psych    Care Discussed with Consultants/Other Providers: cardiology

## 2017-06-27 NOTE — BEHAVIORAL HEALTH ASSESSMENT NOTE - NSBHCHARTREVIEWVS_PSY_A_CORE FT
Vital Signs Last 24 Hrs  T(C): 36.4 (27 Jun 2017 06:02), Max: 36.7 (26 Jun 2017 13:28)  T(F): 97.5 (27 Jun 2017 06:02), Max: 98 (26 Jun 2017 13:28)  HR: 94 (27 Jun 2017 06:02) (86 - 96)  BP: 95/57 (27 Jun 2017 06:02) (95/57 - 123/74)  BP(mean): --  RR: 18 (27 Jun 2017 06:02) (17 - 18)  SpO2: 100% (27 Jun 2017 06:02) (100% - 100%)

## 2017-06-27 NOTE — DISCHARGE NOTE ADULT - CARE PROVIDER_API CALL
Amanuel Sparks (CHAPARRITA), Internal Medicine  [id number] [place] [address]  Phone: [phone]  Fax: [phone]

## 2017-06-27 NOTE — BEHAVIORAL HEALTH ASSESSMENT NOTE - NSBHCHARTREVIEWLAB_PSY_A_CORE FT
Prothrombin Time and INR, Plasma in AM (06.27.17 @ 06:09)    Prothrombin Time, Plasma: 17.5 SEC    INR: 1.55    06-26    137  |  98  |  22  ----------------------------<  101<H>  4.1   |  20<L>  |  0.63    Ca    9.7      26 Jun 2017 06:00  Phos  4.8     06-26  Mg     1.6     06-26                            9.0    7.69  )-----------( 197      ( 26 Jun 2017 06:00 )             28.9

## 2017-06-27 NOTE — PROGRESS NOTE ADULT - ASSESSMENT
Unclear etiology for recent neurologic complaints, as two recent head CTs did not identify acute stroke (unable to obtain MRI because of mechanical valve).  Mobile components noted attached to chordae on TTE.  As discussed above, low suspicion for endocarditis.  If thrombus, it is small and can be monitored for now; patient also on anticoagulation.  Most probable surgical material as per surgeon.  Continue anticoagulation with argatroban and warfarin for INR goal 2.5 to 3.5.  Outpatient f/u with PMD.

## 2017-06-27 NOTE — BEHAVIORAL HEALTH ASSESSMENT NOTE - SUMMARY
36 y/o female with a PMHx of severe MR S/P mechanical MVR at [facility] on 6/7/17 with Dr. Geri Mueller, HTN and HLD presents to ED with dysphagia. Pt has been requesting to leave AMA.  Psych consulted for capacity 38 y/o female with a PMHx of severe MR S/P mechanical MVR at [facility] on 6/7/17 with Dr. Geri Mueller, HTN and HLD presents to ED with dysphagia. Pt has been requesting to leave AMA.  Psych consulted for capacity.  Pt is able to express consistent choice and understand information.  However, pt does not fully appreciate consequences and is only able to focus on achieving INR level.  Pt does not have capacity to leave AMA at this time.  Refuses any psychotropics at this time.

## 2017-06-27 NOTE — BEHAVIORAL HEALTH ASSESSMENT NOTE - NSBHCONSFOLLOWNEEDS_PSY_A_CORE
Patient needs further psychiatric safety assessment prior to discharge reassess psychiatric needs/Patient needs further psychiatric safety assessment prior to discharge

## 2017-06-27 NOTE — DISCHARGE NOTE ADULT - CARE PLAN
Principal Discharge DX:	Aphasia  Goal:	remain stroke free  Instructions for follow-up, activity and diet:	You may have had a small stroke due to your surgery, symptoms resolved.    You should maintain your INR within range 2.5-3.5  Secondary Diagnosis:	HIT (heparin-induced thrombocytopenia)  Goal:	keep platelets within range, avoid heparin  Instructions for follow-up, activity and diet:	You developed an allergy to heparin/lovenox (all related drugs) and it caused your platelets to drop.  You pre surgery platelets were 265, when you were discharged from [place] they were 114 and here 109 and dropped even further to 58.  You were treated with argatroban and your platelets recovered.  AVOID ALL HEPARIN PRODUCTs  Secondary Diagnosis:	Mitral valve disorder  Goal:	heart health  Instructions for follow-up, activity and diet:	You should follow-up with your surgeon Geri Mueller, we notified his office of your hospital stay, updated them of your heparin allergy, and advised them of your discharge today.  Please seen them in their office and maintain your INR 2.5-3.5.  You have appt with your PCP on July 3rd at 10 am.  Secondary Diagnosis:	Anemia due to other cause  Goal:	maintain normal hemoblobin  Instructions for follow-up, activity and diet:	continue with your iron  Secondary Diagnosis:	Hyperlipidemia, unspecified hyperlipidemia type  Instructions for follow-up, activity and diet:	continue with your cholesterol pill Principal Discharge DX:	Aphasia  Goal:	remain stroke free  Instructions for follow-up, activity and diet:	You may have had a small stroke due to your surgery, symptoms resolved.  You should maintain your INR within range 2.5-3.5  Secondary Diagnosis:	HIT (heparin-induced thrombocytopenia)  Goal:	keep platelets within range, avoid heparin  Instructions for follow-up, activity and diet:	*****You developed an allergy to heparin/lovenox (all related drugs) and it caused your platelets to drop.  You pre surgery platelets were 265, when you were discharged from [place] they were 114 and here 109 and dropped even further to 58.  You were treated with argatroban and your platelets recovered. ****** AVOID ALL HEPARIN Products****  Secondary Diagnosis:	Mitral valve disorder  Goal:	heart health  Instructions for follow-up, activity and diet:	You should follow-up with your surgeon Geri Mueller, we notified his office of your hospital stay, updated them of your heparin allergy, and advised them of your discharge today.  Please seen them in their office and maintain your INR 2.5-3.5.   Coumadin 13 mg at night x 3 days  on 6/30, 7/1 and 7/2 and has PCP appt on July 3rd @ 10 am.  Secondary Diagnosis:	Anemia due to other cause  Goal:	maintain normal hemoglobin  Instructions for follow-up, activity and diet:	continue with your iron  Secondary Diagnosis:	Hyperlipidemia, unspecified hyperlipidemia type  Goal:	Lipid control  Instructions for follow-up, activity and diet:	continue with your cholesterol pill  Secondary Diagnosis:	S/P MVR (mitral valve replacement)  Goal:	wound healing  Instructions for follow-up, activity and diet:	wound care: Anterior 4-5th Intercoastal Space area- clean with Saf Clens. Pat dry. Apply Liquid barrier film to periwound skin. Apply Medihoney gel to wound base. Cover with foam with borders. Change daily. Left groin-Gently Clean with NS. Apply Liquid barrier film to periwound skin. Cover wound base with Aquacel AG. Cover with foam with borders. Please follow up with your surgeon in 1 week Principal Discharge DX:	Aphasia  Goal:	remain stroke free  Instructions for follow-up, activity and diet:	You may have had a small stroke due to your surgery, symptoms resolved.  You should maintain your INR within range 2.5-3.5  Secondary Diagnosis:	HIT (heparin-induced thrombocytopenia)  Goal:	keep platelets within range, avoid heparin  Instructions for follow-up, activity and diet:	*****You developed an allergy to heparin/lovenox (all related drugs) and it caused your platelets to drop.  You pre surgery platelets were 265, when you were discharged from [place] they were 114 and here 109 and dropped even further to 58.  You were treated with argatroban and your platelets recovered. ****** AVOID ALL HEPARIN Products****  Secondary Diagnosis:	Mitral valve disorder  Goal:	heart health  Instructions for follow-up, activity and diet:	You should follow-up with your surgeon Geri Mueller, we notified his office of your hospital stay, updated them of your heparin allergy, and advised them of your discharge today.  Please seen them in their office and maintain your INR 2.5-3.5.   Coumadin 13 mg at night x 3 days  on 6/30, 7/1 and 7/2 and has PCP appt on July 3rd @ 10 am.  Secondary Diagnosis:	Anemia due to other cause  Goal:	maintain normal hemoglobin  Instructions for follow-up, activity and diet:	continue with your iron  Secondary Diagnosis:	Hyperlipidemia, unspecified hyperlipidemia type  Goal:	Lipid control  Instructions for follow-up, activity and diet:	continue with your cholesterol pill  Secondary Diagnosis:	S/P MVR (mitral valve replacement)  Goal:	wound healing  Instructions for follow-up, activity and diet:	wound care: Anterior 4-5th Intercoastal Space area- clean with Saf Clens. Pat dry. Apply Liquid barrier film to periwound skin. Apply Medihoney gel to wound base. Cover with foam with borders. Change daily. Left groin-Gently Clean with NS. Apply Liquid barrier film to periwound skin. Cover wound base with Aquacel AG. Cover with foam with borders. Please follow up with your surgeon in 1 week Principal Discharge DX:	Aphasia  Goal:	remain stroke free  Instructions for follow-up, activity and diet:	You may have had a small stroke due to your surgery, symptoms resolved.  You should maintain your INR within range 2.5-3.5. Take Coumadin total 13 mg until 7/3/17 and discuss further dose of Coumadin with PCP on July 3rd at 10 am**** take 2 tabs of coumadin 5 mg =10 mg and 3 tabs of 1 mg for total 13 mg  Secondary Diagnosis:	HIT (heparin-induced thrombocytopenia)  Goal:	keep platelets within range, avoid heparin  Instructions for follow-up, activity and diet:	*****You developed an allergy to heparin/lovenox (all related drugs) and it caused your platelets to drop.  You pre surgery platelets were 265, when you were discharged from [place] they were 114 and here 109 and dropped even further to 58.  You were treated with argatroban and your platelets recovered. ****** AVOID ALL HEPARIN Products****  Secondary Diagnosis:	Mitral valve disorder  Goal:	heart health  Instructions for follow-up, activity and diet:	You should follow-up with your surgeon Geri Mueller, we notified his office of your hospital stay, updated them of your heparin allergy, and advised them of your discharge today.  Please seen them in their office and maintain your INR 2.5-3.5.   Coumadin 13 mg at night x 3 days  on 6/30, 7/1 and 7/2 and has PCP appt on July 3rd @ 10 am.  Secondary Diagnosis:	Anemia due to other cause  Goal:	maintain normal hemoglobin  Instructions for follow-up, activity and diet:	continue with your iron  Secondary Diagnosis:	Hyperlipidemia, unspecified hyperlipidemia type  Goal:	Lipid control  Instructions for follow-up, activity and diet:	continue with your cholesterol pill  Secondary Diagnosis:	S/P MVR (mitral valve replacement)  Goal:	wound healing  Instructions for follow-up, activity and diet:	wound care: Anterior 4-5th Intercoastal Space area- clean with Saf Clens. Pat dry. Apply Liquid barrier film to periwound skin. Apply Medihoney gel to wound base. Cover with foam with borders. Change daily. Left groin-Gently Clean with NS. Apply Liquid barrier film to periwound skin. Cover wound base with Aquacel AG. Cover with foam with borders. Please follow up with your surgeon in 1 week Principal Discharge DX:	Aphasia  Goal:	remain stroke free  Instructions for follow-up, activity and diet:	You may have had a small stroke due to your surgery, symptoms resolved.  You should maintain your INR within range 2.5-3.5. Take Coumadin total 13 mg until 7/3/17 and discuss further dose of Coumadin with PCP on July 3rd at 10 am**** take 2 tabs of coumadin 5 mg =10 mg and 3 tabs of 1 mg for total 13 mg  Secondary Diagnosis:	HIT (heparin-induced thrombocytopenia)  Goal:	keep platelets within range, avoid heparin  Instructions for follow-up, activity and diet:	*****You developed an allergy to heparin/lovenox (all related drugs) and it caused your platelets to drop.  You pre surgery platelets were 265, when you were discharged from [place] they were 114 and here 109 and dropped even further to 58.  You were treated with argatroban and your platelets recovered. ****** AVOID ALL HEPARIN Products****  Secondary Diagnosis:	Mitral valve disorder  Goal:	heart health  Instructions for follow-up, activity and diet:	You should follow-up with your surgeon Geri Mueller, we notified his office of your hospital stay, updated them of your heparin allergy, and advised them of your discharge today.  Please seen them in their office and maintain your INR 2.5-3.5.   Coumadin 13 mg at night x 3 days  on 6/30, 7/1 and 7/2 and has PCP appt on July 3rd @ 10 am.  Secondary Diagnosis:	Anemia due to other cause  Goal:	maintain normal hemoglobin  Instructions for follow-up, activity and diet:	continue with your iron  Secondary Diagnosis:	Hyperlipidemia, unspecified hyperlipidemia type  Goal:	Lipid control  Instructions for follow-up, activity and diet:	continue with your cholesterol pill  Secondary Diagnosis:	S/P MVR (mitral valve replacement)  Goal:	wound healing  Instructions for follow-up, activity and diet:	wound care: Anterior 4-5th Intercoastal Space area- clean with Saf Clens. Pat dry. Apply Liquid barrier film to periwound skin. Apply Medihoney gel to wound base. Cover with foam with borders. Change daily. Left groin-Gently Clean with NS. Apply Liquid barrier film to periwound skin. Cover wound base with Aquacel AG. Cover with foam with borders. Please follow up with your surgeon in 1 week Principal Discharge DX:	Aphasia  Goal:	remain stroke free  Instructions for follow-up, activity and diet:	You may have had a small stroke due to your surgery, symptoms resolved.  You should maintain your INR within range 2.5-3.5. Take Coumadin total 13 mg until 7/3/17 and discuss further dose of Coumadin with PCP on July 3rd at 10 am**** take 2 tabs of coumadin 5 mg =10 mg and 3 tabs of 1 mg for total 13 mg  Secondary Diagnosis:	HIT (heparin-induced thrombocytopenia)  Goal:	keep platelets within range, avoid heparin  Instructions for follow-up, activity and diet:	*****You developed an allergy to heparin/lovenox (all related drugs) and it caused your platelets to drop.  You pre surgery platelets were 265, when you were discharged from [place] they were 114 and here 109 and dropped even further to 58.  You were treated with argatroban and your platelets recovered. ****** AVOID ALL HEPARIN Products****  Secondary Diagnosis:	Mitral valve disorder  Goal:	heart health  Instructions for follow-up, activity and diet:	You should follow-up with your surgeon Geri Mueller, we notified his office of your hospital stay, updated them of your heparin allergy, and advised them of your discharge today.  Please seen them in their office and maintain your INR 2.5-3.5.   Coumadin 13 mg at night x 3 days  on 6/30, 7/1 and 7/2 and has PCP appt on July 3rd @ 10 am.  Secondary Diagnosis:	Anemia due to other cause  Goal:	maintain normal hemoglobin  Instructions for follow-up, activity and diet:	continue with your iron  Secondary Diagnosis:	Hyperlipidemia, unspecified hyperlipidemia type  Goal:	Lipid control  Instructions for follow-up, activity and diet:	continue with your cholesterol pill  Secondary Diagnosis:	S/P MVR (mitral valve replacement)  Goal:	wound healing  Instructions for follow-up, activity and diet:	wound care: Anterior 4-5th Intercoastal Space area- clean with Saf Clens. Pat dry. Apply Liquid barrier film to periwound skin. Apply Medihoney gel to wound base. Cover with foam with borders. Change daily. Left groin-Gently Clean with NS. Apply Liquid barrier film to periwound skin. Cover wound base with Aquacel AG. Cover with foam with borders. Please follow up with your surgeon in 1 week Principal Discharge DX:	Aphasia  Goal:	remain stroke free  Instructions for follow-up, activity and diet:	You may have had a small stroke due to your surgery, symptoms resolved.  You should maintain your INR within range 2.5-3.5. Take Coumadin total 13 mg until 7/3/17 and discuss further dose of Coumadin with PCP on July 3rd at 10 am**** take 2 tabs of coumadin 5 mg =10 mg and 3 tabs of 1 mg for total 13 mg  Secondary Diagnosis:	HIT (heparin-induced thrombocytopenia)  Goal:	keep platelets within range, avoid heparin  Instructions for follow-up, activity and diet:	*****You developed an allergy to heparin/lovenox (all related drugs) and it caused your platelets to drop.  You pre surgery platelets were 265, when you were discharged from [place] they were 114 and here 109 and dropped even further to 58.  You were treated with argatroban and your platelets recovered. ****** AVOID ALL HEPARIN Products****  Secondary Diagnosis:	Mitral valve disorder  Goal:	heart health  Instructions for follow-up, activity and diet:	You should follow-up with your surgeon Geri Mueller, we notified his office of your hospital stay, updated them of your heparin allergy, and advised them of your discharge today.  Please seen them in their office and maintain your INR 2.5-3.5.   Coumadin 13 mg at night x 3 days  on 6/30, 7/1 and 7/2 and has PCP appt on July 3rd @ 10 am.  Secondary Diagnosis:	Anemia due to other cause  Goal:	maintain normal hemoglobin  Instructions for follow-up, activity and diet:	continue with your iron  Secondary Diagnosis:	Hyperlipidemia, unspecified hyperlipidemia type  Goal:	Lipid control  Instructions for follow-up, activity and diet:	continue with your cholesterol pill  Secondary Diagnosis:	S/P MVR (mitral valve replacement)  Goal:	wound healing  Instructions for follow-up, activity and diet:	wound care: Anterior 4-5th Intercoastal Space area- clean with Saf Clens. Pat dry. Apply Liquid barrier film to periwound skin. Apply Medihoney gel to wound base. Cover with foam with borders. Change daily. Left groin-Gently Clean with NS. Apply Liquid barrier film to periwound skin. Cover wound base with Aquacel AG. Cover with foam with borders. Please follow up with your surgeon in 1 week

## 2017-06-27 NOTE — DISCHARGE NOTE ADULT - SECONDARY DIAGNOSIS.
HIT (heparin-induced thrombocytopenia) Mitral valve disorder Anemia due to other cause Hyperlipidemia, unspecified hyperlipidemia type S/P MVR (mitral valve replacement)

## 2017-06-27 NOTE — PROGRESS NOTE ADULT - PROBLEM SELECTOR PLAN 4
likely 2/2 post op status also hemolysis 2/2 valve  c/w iron, improving H/H  transfuse <7  likely mild hemorrhoidal bleeding, monitor, bowel regimen

## 2017-06-27 NOTE — DISCHARGE NOTE ADULT - PATIENT PORTAL LINK FT
“You can access the Follow[employer] Patient Portal, offered by [facility], by registering with the following website: [email or web address]”

## 2017-06-27 NOTE — DISCHARGE NOTE ADULT - MEDICATION SUMMARY - MEDICATIONS TO TAKE
I will START or STAY ON the medications listed below when I get home from the hospital:    Coumadin 5 mg oral tablet  -- take 2 tab(s) by mouth once a day with 3 tabs of 1 mg total 13 mg on 6/30, 7/1, 7/2  -- Indication: For S/P MVR (mitral valve replacement)    simvastatin 40 mg oral tablet  -- 1 tab(s) by mouth once a day (at bedtime)  -- Indication: For HLD (hyperlipidemia)    lidocaine 4% topical film  -- Apply on skin to affected area   -- Indication: For pain control     ferrous sulfate 325 mg (65 mg elemental iron) oral tablet  -- 1 tab(s) by mouth once a day  -- Indication: For Anemia     polyethylene glycol 3350 oral powder for reconstitution  -- 17 gram(s) by mouth once a day  -- Indication: For constipation     senna oral tablet  -- 2 tab(s) by mouth once a day, As Needed  -- Indication: For constipation     docusate sodium 100 mg oral capsule  -- 1 cap(s) by mouth 2 times a day  -- Indication: For constipation     pantoprazole 40 mg oral delayed release tablet  -- 1 tab(s) by mouth once a day  -- Indication: For Need for prophylactic measure    Vitamin D2 50,000 intl units (1.25 mg) oral capsule  -- 1 cap(s) by mouth once a week  -- Indication: For Supplement I will START or STAY ON the medications listed below when I get home from the hospital:    Coumadin 5 mg oral tablet  -- take 2 tab(s) by mouth once a day with 3 tabs of 1 mg total 13 mg on 6/30, 7/1, 7/2  -- Indication: For S/P MVR (mitral valve replacement)    Coumadin 1 mg oral tablet  -- 3 tab(s) by mouth once a day, in addition to 5 mg (2tabs) total 13 mg daily  -- Do not take this drug if you are pregnant.  It is very important that you take or use this exactly as directed.  Do not skip doses or discontinue unless directed by your doctor.  Obtain medical advice before taking any non-prescription drugs as some may affect the action of this medication.    -- Indication: For S/P MVR (mitral valve replacement)    simvastatin 40 mg oral tablet  -- 1 tab(s) by mouth once a day (at bedtime)  -- Indication: For HLD (hyperlipidemia)    lidocaine 4% topical film  -- Apply on skin to affected area   -- Indication: For pain control     ferrous sulfate 325 mg (65 mg elemental iron) oral tablet  -- 1 tab(s) by mouth once a day  -- Indication: For Anemia     polyethylene glycol 3350 oral powder for reconstitution  -- 17 gram(s) by mouth once a day  -- Indication: For constipation     senna oral tablet  -- 2 tab(s) by mouth once a day, As Needed  -- Indication: For constipation     docusate sodium 100 mg oral capsule  -- 1 cap(s) by mouth 2 times a day  -- Indication: For constipation     pantoprazole 40 mg oral delayed release tablet  -- 1 tab(s) by mouth once a day  -- Indication: For Need for prophylactic measure    Vitamin D2 50,000 intl units (1.25 mg) oral capsule  -- 1 cap(s) by mouth once a week  -- Indication: For Supplement

## 2017-06-27 NOTE — DIETITIAN INITIAL EVALUATION ADULT. - OTHER INFO
Initial Dietitian Evaluation 2/2 to extended length of stay. Patient denies any nausea/vomiting/diarrhea/constipation or difficulty chewing and swallowing. Patient reports good PO intake at present and prior to admission. Pt with limited engagement in RDN visit, possibly related to language barrier.  Pt refuses  phone service.  Pt aware that RDN remains available.  RN endorses good PO .

## 2017-06-27 NOTE — DIETITIAN INITIAL EVALUATION ADULT. - PERTINENT LABORATORY DATA
06-26 Na137 mmol/L Glu 101 mg/dL<H> K+ 4.1 mmol/L Cr  0.63 mg/dL BUN 22 mg/dL Phos 4.8 mg/dL<H> Alb n/a   PAB n/a

## 2017-06-27 NOTE — PROGRESS NOTE ADULT - PROBLEM SELECTOR PLAN 1
thrombocytopenia 2/2 heparin; 58-->97-->112-->123-->148-->178-->197 since changing to argatroban (per d/w OP CT surgeon office pre-op plt 265), no s/s of thrombosis, HIT screen + and NADINE +  -appreciate heme c/s c/w argatroban per their note: Once plt >150 with c/w argatroban and add warfarin until INR is greater than 4 (on combined treatment), then discontinue argatroban gtt. 4 hours later, repeat INR to check for therapeutic coumadin levels (goal here is 2.5-3.5 given mechanical valve). If therapeutic, permanently discontinue argatroban. If subtherapeutic, restart argatroban and repeat process again when INR is higher on combined therapy (i.e, 4.5), coumadin 11 mg today  -Given mechanical MVR, pt will require indefinite anticoagulation  -psych involved as patient/ state they want to leave, seem to understand superficially risk/benefit but unclear if actually understand overall consequences of subtherapuetic INR in context of valve

## 2017-06-27 NOTE — BEHAVIORAL HEALTH ASSESSMENT NOTE - CASE SUMMARY
38 yo Persian female with adjustment disorder  1-patient cannot leave AMA  2-if anxious ativan 1mg prn Q8 hours

## 2017-06-27 NOTE — DISCHARGE NOTE ADULT - INSTRUCTIONS
heart healthy wound care: Anterior 4-5th Intercoastal Space area- clean with Saf Clens. Pat dry. Apply Liquid barrier film to periwound skin. Apply Medihoney gel to wound base. Cover with foam with borders. Change daily. Left groin-Gently Clean with NS. Apply Liquid barrier film to periwound skin. Cover wound base with Aquacel AG. Cover with foam with borders. You have appt with your PCP on July 3rd at 10 am.

## 2017-06-27 NOTE — DISCHARGE NOTE ADULT - OTHER SIGNIFICANT FINDINGS
Admit Diagnosis) Aphasia  (PMH) Osteomyelitis of arm  (PMH) MR (mitral regurgitation)  (PMH) HLD (hyperlipidemia)  (PMH) HTN (hypertension)  (PMH) MVP (mitral valve prolapse)  (Principal DC/DX) Aphasia  (Problem/DX) Adjustment disorder, unspecified type  (Problem/DX) HIT (heparin-induced thrombocytopenia)  (Problem/DX) Thrombocytopenia  (Problem/DX) Anticoagulation management encounter  (Problem/DX) Essential hypertension  (Problem/DX) Mitral valve disorder  (Problem/DX) Need for prophylactic measure  (Problem/DX) HLD (hyperlipidemia)  (Problem/DX) HTN (hypertension)  (Problem/DX) Anemia  (Problem/DX) MR (mitral regurgitation)  (Problem/DX) Aphasia  (PSH) S/P MVR (mitral valve replacement)  ([facility]) S/P

## 2017-06-27 NOTE — BEHAVIORAL HEALTH ASSESSMENT NOTE - HPI (INCLUDE ILLNESS QUALITY, SEVERITY, DURATION, TIMING, CONTEXT, MODIFYING FACTORS, ASSOCIATED SIGNS AND SYMPTOMS)
HPI:  36 y/o female with a PMHx of severe MR S/P mechanical MVR at [facility] on 6/7/17 with Dr. Geri Mueller, HTN and HLD presents to ED with dysphagia since this morning. Pt was found to have severe MR and underwent successful mechanical mitral valve replacement on June 7, 2017 at [facility] with Dr. Geri Mueller. Pt was hospitalized until June 14, 2017 and was being given Lovenox with a bridge to Coumadin. Pt was apparently discharged with a subtherapeutic INR (as per , INR was less than 2.5) and was given Lovenox injections. Pt had a visiting nurse come to the house on Margarette 15, 2017 (the day after discharge) to educate the patient on how to inject the Lovenox. Pt and her  have been able to self inject the Lovenox since then but patient has not been taking Coumadin. As per , they were told to start Coumadin after her Lovenox injections were completed. Last night, patient's  was playing with their 4 year old toddler and he feel off his father's shoulders. When this happened, pt became very scared, startled and started shaking uncontrollably. Pt subsequently lost consciousness while sitting on the sofa. Pt did not have any head or bodily trauma.  called 911 immediately. Pt was unconsciousness for over 5 minutes until EMS came. EMS was able to shake her and wake her up, but since then she has been completely aphasic and expresses generalized weakness. Pt is independent at baseline. Since she has been here,  reports that her aphasia is improving but she is not back at her baseline functionality. Pt denies fever, chills, recent travel, headache, dizziness, visual deficits, tinnitus, chest pain, shortness of breath, palpitations, abdominal pain, N/V/D/C, hematochezia, melena, dysuria, hematuria, urinary/fecal incontinence, aura, tongue lacerations. Upon arrival to ED, EKG revealed NSR at 94 bpm, Q wave III, TWI III, AVF. Pt ruled out for ACS with two sets of negative cardiac enzymes. CT head revealed no acute intracranial hemorrhage, mass effect, or shift. H/H: 8.0/26.6. Platelets: 109. Pt is now admitted to telemetry. (19 Jun 2017 08:17)    Seen and examined at bedside.  Pt is irritable and repeatedly asking about her INR levels.  States she needs to be home with her daughter. HPI:  38 y/o female with a PMHx of severe MR S/P mechanical MVR at [facility] on 6/7/17 with Dr. Geri Mueller, HTN and HLD presents to ED with dysphagia since this morning. Pt was found to have severe MR and underwent successful mechanical mitral valve replacement on June 7, 2017 at [facility] with Dr. Geri Mueller. Pt was hospitalized until June 14, 2017 and was being given Lovenox with a bridge to Coumadin. Pt was apparently discharged with a subtherapeutic INR (as per , INR was less than 2.5) and was given Lovenox injections. Pt had a visiting nurse come to the house on Margarette 15, 2017 (the day after discharge) to educate the patient on how to inject the Lovenox. Pt and her  have been able to self inject the Lovenox since then but patient has not been taking Coumadin. As per , they were told to start Coumadin after her Lovenox injections were completed. Last night, patient's  was playing with their 4 year old toddler and he feel off his father's shoulders. When this happened, pt became very scared, startled and started shaking uncontrollably. Pt subsequently lost consciousness while sitting on the sofa. Pt did not have any head or bodily trauma.  called 911 immediately. Pt was unconsciousness for over 5 minutes until EMS came. EMS was able to shake her and wake her up, but since then she has been completely aphasic and expresses generalized weakness. Pt is independent at baseline. Since she has been here,  reports that her aphasia is improving but she is not back at her baseline functionality. Pt denies fever, chills, recent travel, headache, dizziness, visual deficits, tinnitus, chest pain, shortness of breath, palpitations, abdominal pain, N/V/D/C, hematochezia, melena, dysuria, hematuria, urinary/fecal incontinence, aura, tongue lacerations. Upon arrival to ED, EKG revealed NSR at 94 bpm, Q wave III, TWI III, AVF. Pt ruled out for ACS with two sets of negative cardiac enzymes. CT head revealed no acute intracranial hemorrhage, mass effect, or shift. H/H: 8.0/26.6. Platelets: 109. Pt is now admitted to telemetry. (19 Jun 2017 08:17)    Seen and examined at bedside.  Pt is irritable and repeatedly asking about her INR levels.  States she needs to be home with her daughter.  Feels frustrated with prolonged hospital stay.  Endorses difficulty sleeping in hospital setting.  Pt denies mood or psychotic symptoms.  Denies SI/HI/AH/VH.    Had family meeting with pt and pt's .  Discussed at length with Dr. Zimmer about need for continued hospitalization and high risk of stroke or clot at this time.  Pt's  verbalizes understanding.

## 2017-06-27 NOTE — DISCHARGE NOTE ADULT - PLAN OF CARE
You may have had a small stroke due to your surgery, symptoms resolved.    You should maintain your INR within range 2.5-3.5 remain stroke free keep platelets within range, avoid heparin You developed an allergy to heparin/lovenox (all related drugs) and it caused your platelets to drop.  You pre surgery platelets were 265, when you were discharged from [place] they were 114 and here 109 and dropped even further to 58.  You were treated with argatroban and your platelets recovered.  AVOID ALL HEPARIN PRODUCTs heart health You should follow-up with your surgeon Geri Mueller, we notified his office of your hospital stay, updated them of your heparin allergy, and advised them of your discharge today.  Please seen them in their office and maintain your INR 2.5-3.5.  You have appt with your PCP on July 3rd at 10 am. maintain normal hemoblobin continue with your iron continue with your cholesterol pill Lipid control wound healing wound care: Anterior 4-5th Intercoastal Space area- clean with Saf Clens. Pat dry. Apply Liquid barrier film to periwound skin. Apply Medihoney gel to wound base. Cover with foam with borders. Change daily. Left groin-Gently Clean with NS. Apply Liquid barrier film to periwound skin. Cover wound base with Aquacel AG. Cover with foam with borders. Please follow up with your surgeon in 1 week You may have had a small stroke due to your surgery, symptoms resolved.  You should maintain your INR within range 2.5-3.5 *****You developed an allergy to heparin/lovenox (all related drugs) and it caused your platelets to drop.  You pre surgery platelets were 265, when you were discharged from [place] they were 114 and here 109 and dropped even further to 58.  You were treated with argatroban and your platelets recovered. ****** AVOID ALL HEPARIN Products**** You should follow-up with your surgeon Geri Mueller, we notified his office of your hospital stay, updated them of your heparin allergy, and advised them of your discharge today.  Please seen them in their office and maintain your INR 2.5-3.5.   Coumadin 13 mg at night x 3 days  on 6/30, 7/1 and 7/2 and has PCP appt on July 3rd @ 10 am. maintain normal hemoglobin You may have had a small stroke due to your surgery, symptoms resolved.  You should maintain your INR within range 2.5-3.5. Take Coumadin total 13 mg until 7/3/17 and discuss further dose of Coumadin with PCP on July 3rd at 10 am**** take 2 tabs of coumadin 5 mg =10 mg and 3 tabs of 1 mg for total 13 mg

## 2017-06-27 NOTE — BEHAVIORAL HEALTH ASSESSMENT NOTE - AXIS III
severe MR S/P mechanical MVR at [facility] on 6/7/17 with Dr. Geri Mueller, HTN and HLD

## 2017-06-27 NOTE — PROGRESS NOTE ADULT - PROBLEM SELECTOR PLAN 1
Can f/u outside cardiologist and PMD.    Continue argatroban gtt as per protocol.  Anticoagulation with warfarin for INR goal 2.5 to 3.5 if patient is stable without evidence of bleeding in setting of thrombocytopenia.

## 2017-06-27 NOTE — BEHAVIORAL HEALTH ASSESSMENT NOTE - NSBHCONSULTRECOMMENDOTHER_PSY_A_CORE FT
1 - Pt cannot leave AMA  2 - No standing medications needed at this time.  3 - For anxiety, Ativan 1mg PO q8h  4 - Will follow

## 2017-06-27 NOTE — PROGRESS NOTE ADULT - SUBJECTIVE AND OBJECTIVE BOX
Cardiology/Vascular Medicine Inpatient Progress Note    Asked by Dr. Davis to see patient re: anticoagulation management in setting of recent MVR.    Patient wants to go home.    Continuing with argatroban and eventual anticoagulation with warfarin.    PT/INR - ( 26 Jun 2017 06:00 )   PT: 17.2 SEC;   INR: 1.52     PTT - ( 26 Jun 2017 06:00 )  PTT:64.4 SEC    Vital Signs Last 24 Hrs  T(C): 36.4, Max: 37.3 (06-25 @ 21:55)  T(F): 97.6, Max: 99.1 (06-25 @ 21:55)  HR: 94 (94 - 100)  BP: 110/62 (110/62 - 122/71)  BP(mean): --  RR: 18 (18 - 18)  SpO2: 98% (98% - 99%)    Appearance: Normal	  HEENT:   Normal oral mucosa, PERRL, EOMI	  Lymphatic: No lymphadenopathy  Cardiovascular: Normal S1 S2, No JVD, No murmurs, +crisp click, No edema  Respiratory: Lungs clear to auscultation	  Psychiatry: A & O x 3, Mood & affect appropriate  Gastrointestinal:  Soft, Non-tender, + BS	  Skin: No rashes, No ecchymoses, No cyanosis	  Neurologic: Non-focal  Extremities: Normal range of motion, No clubbing, cyanosis or edema  Vascular: Peripheral pulses palpable 2+ bilaterally    MEDICATIONS  (STANDING):  atorvastatin 40milliGRAM(s) Oral at bedtime  ferrous    sulfate 325milliGRAM(s) Oral daily  argatroban Infusion 2MICROgram(s)/kG/Min IV Continuous <Continuous>  docusate sodium 100milliGRAM(s) Oral three times a day    MEDICATIONS  (PRN):  acetaminophen   Tablet. 650milliGRAM(s) Oral every 6 hours PRN Mild Pain (1 - 3)  senna 2Tablet(s) Oral at bedtime PRN Constipation      LABS:	 	                          9.0    7.69  )-----------( 197      ( 26 Jun 2017 06:00 )             28.9     06-25    136  |  101  |  21  ----------------------------<  107<H>  4.1   |  20<L>  |  0.66    Ca    9.5      25 Jun 2017 06:35  Phos  4.1     06-25  Mg     1.9     06-25        Patient name: ДМИТРИЙ ALY  YOB: 1980   Age: 37 (F)   MR#: [id number]  Study Date: 6/22/2017  Location: 460D Sonographer: Sonia Craft [facility]  Study quality: Technically good  Referring Physician: Nisha Zimmer MD / Luis Malhotra MD  Blood Pressure: 118/71 mmHg  Height: 150 cm  Weight: 80 kg  BSA: 1.8 m2  ------------------------------------------------------------------------  PROCEDURE: Transthoracic echocardiogram with 2-D, M-Mode  and complete spectral and color flow Doppler.  INDICATION: Nonrheumatic mitral valve disorder, unspecified  (I34.9)  ------------------------------------------------------------------------  DIMENSIONS:  Dimensions:     Normal Values:  LA:       ---     2.0 - 4.0 cm  Ao:     2.5 cm    2.0 - 3.8 cm  SEPTUM: 0.7 cm    0.6 - 1.2 cm  PWT:    1.0 cm    0.6 - 1.1 cm  LVIDd:  4.4 cm    3.0 - 5.6 cm  LVIDs:    ---     1.8 - 4.0 cm  Derived Variables:  LVMI: 68 g/m2  RWT: 0.45  ------------------------------------------------------------------------  OBSERVATIONS:  Mitral Valve: Mechanical prosthetic mitral valve  replacement. The leaflets are not well visualized. Minimal  mitral regurgitation. Mean transmitral valve gradient  equals 4 mm Hg, which is probably normal in the setting of  a prosthetic valve.  Aortic Root: Normal aortic root.  Aortic Valve: Calcified trileaflet aortic valve with normal  opening.  Left Atrium: Normal left atrium.  LA volume index = 29  cc/m2.  Left Ventricle: Normal left ventricular systolic function.  No segmental wall motion abnormalities. Echodensity seen  within the left ventricle is probably calcified chord.  Right Heart: Normal right atrium. Normal right ventricular  size and function. Normal tricuspid valve. Mild tricuspid  regurgitation. Normal pulmonic valve. Minimal pulmonic  regurgitation.  Pericardium/PleuraNormal pericardium with no pericardial  effusion.  Hemodynamic: Estimated right ventricular systolic pressure  equals 36 mm Hg, assuming right atrial pressure equals 10  mm Hg, consistent with borderline pulmonary hypertension.  ------------------------------------------------------------------------  CONCLUSIONS:  1. Mechanical prosthetic mitral valve replacement. The  leaflets are not well visualized. Minimal mitral  regurgitation. Mean transmitral valve gradient equals 4 mm  Hg, which is probably normal in the setting of a prosthetic  valve.  2. Calcified trileaflet aortic valve with normal opening.  3. Normal left ventricular systolic function. No segmental  wall motion abnormalities. Echodensity seen within the left  ventricle is probably calcified chord.  4. Normal right ventricular size and function.  5. Estimated pulmonary artery systolic pressure equals 36  mm Hg, assuming right atrial pressure equals 10  mm Hg,  consistent with borderline pulmonary hypertension.  No obvious cardiac source of aphasia is identified, consider  EVELINA for further workup, if clinically warranted.  ------------------------------------------------------------------------  Confirmed on  6/24/2017 - 16:36:07 by RONNIE Wolff  ------------------------------------------------------------------------        EXAM:  CT BRAIN        PROCEDURE DATE:  Jun 19 2017     INTERPRETATION:  HISTORY: Fall from standing. Not talking.    Technique: CT of the head was performed without contrast.    Multiple contiguous axial images were acquired from the skullbaseto the   vertex without the administration of intravenous contrast.  Coronal and   sagittal reformations were made.    COMPARISON: CT head dated 6/18/2017.    FINDINGS:  The ventricles and sulci are within normal limits. There are no areas of   abnormal attenuation within the brain parenchyma. There is no   intraparenchymal hematoma, mass effect or midline shift. No abnormal   extra-axial fluid collections are present. There is no evidence of acute   transcortical territorial infarction.    The calvarium is intact. The visualized intraorbital compartments,   paranasal sinuses and mastoid complexes appear free of acute disease.    IMPRESSION:  No acute intracranial hemorrhage.  No evidence for acute transcortical territorial infarction.      CHEO OTERO M.D., ATTENDING RADIOLOGIST  This document has been electronically signed. Jun 19 2017 11:48AM

## 2017-06-27 NOTE — DISCHARGE NOTE ADULT - HOSPITAL COURSE
36 y/o female with a PMHx of severe MR S/P mechanical MVR at [facility] on 6/7/17 with Dr. Geri Mueller, HTN and HLD presents to ED with dysphagia since this morning. Pt was found to have severe MR and underwent successful mechanical mitral valve replacement on June 7, 2017 at [facility] with Dr. Geri Mueller. Pt was hospitalized until June 14, 2017 and was being given Lovenox with a bridge to Coumadin. Pt was apparently discharged with a subtherapeutic INR (as per , INR was less than 2.5) and was given Lovenox injections. Pt had a visiting nurse come to the house on Margarette 15, 2017 (the day after discharge) to educate the patient on how to inject the Lovenox. Pt and her  have been able to self inject the Lovenox since then but patient has not been taking Coumadin. As per , they were told to start Coumadin after her Lovenox injections were completed. Last night, patient's  was playing with their 4 year old toddler and he feel off his father's shoulders. When this happened, pt became very scared, startled and started shaking uncontrollably. Pt subsequently lost consciousness while sitting on the sofa. Pt did not have any head or bodily trauma.  called 911 immediately. Pt was unconsciousness for over 5 minutes until EMS came. EMS was able to shake her and wake her up, but since then she has been completely aphasic and expresses generalized weakness. Pt is independent at baseline. Since she has been here,  reports that her aphasia is improving but she is not back at her baseline functionality. Pt denies fever, chills, recent travel, headache, dizziness, visual deficits, tinnitus, chest pain, shortness of breath, palpitations, abdominal pain, N/V/D/C, hematochezia, melena, dysuria, hematuria, urinary/fecal incontinence, aura, tongue lacerations. Upon arrival to ED, EKG revealed NSR at 94 bpm, Q wave III, TWI III, AVF. Pt ruled out for ACS with two sets of negative cardiac enzymes. CT head revealed no acute intracranial hemorrhage, mass effect, or shift. H/H: 8.0/26.6. Platelets: 109. Pt is now admitted to telemetry.   EKG: NSR at 94 bpm, Q wave III, TWI III, AVF  CE x2: Negative  CT head: No acute intracranial hemorrhage, mass effect, or shift.  H/H: 8.0/26.6  Platelets: 109    6/19 Neurology consult: CT head negative suspicion for central process is low. Would rule out CVA with CT angio head/neck now and MRI head without contrast in CDU overnight. Would get psych consult.    6/19 CT angio head/neck: Patent intracranial and cervical vasculature without major vessel occlusion, stenosis, aneurysm or dissection.  6/19 CT head: No acute intracranial hemorrhage. No evidence for acute transcortical territorial infarction.    6/20: Med INR 2.5-.3.5 Valve is MRI safe if needed, TTE, transfuse for H/H <7  6/20 Cards (monzon) ehp to coumadin  6/20 Rehab: Aphasia resolved. No functional deficits at this time. Rec PT at home to increase endurance after MVR. Neuro f/u.  6/20 Med: Possible HIT, Blue top PLT, LDH/Hapto, hold coumadin until Heme eval  6/22_Meds:  Thrombocytopenia: 100s to 58--> 97 since changing to argatroban, no s/s of thrombosis, concern for possible HIT pending HIT screen result. argatroban gtt, hold coumadin until ?plt 150 however baseline was lower so will f/u with heme. HIT screen sent checked HIV and hep serologies for other etiologies of low plt, HIV neg, hep pending. Aphasia: pt without aphasia or weakness today. given poor health literacy and possible CVA need bridging mary now w/ acute plt drop  will need inpatient bridge. c/w argatroban gtt  2.5-3.5, holding coumadin pending improvement in plt, MR: s/p robotic repair with mechanical valve  ac as above: local incision care and monitoring. Anemia. likely 2/2 post op status also hemolysis 2/2 valve, can start iron, transfuse <7 likely mild hemorrhoidal bleeding, monitor, bowel regimen. HTN: monitor off meds, at goal.  HLD (hyperlipidemia). Plan: c/w statin.     LE Doppler: No evidence of bilateral lower extremity deep venous thrombosis.    6/22_Cards: Problem: Mitral valve disorder.  Plan: Heparin stopped and argatroban gtt started in setting of platelet count drop. c/w anticoagulation with warfarin for INR goal 2.5 to 3.5 if patient is stable without evidence of bleeding in setting of thrombocytopenia. Heme c/s, review labs for platelet clumping. Aphasia.  Supportive management for stroke.  Aspirin held, on statin. HTN: BPs acceptable.  HLD on statin. AC management encounter.  Plan: Started on argatroban gtt for acute drop in platelet count. Thrombocytopenia.  6/23 cards: awaiting TTE, continue with AC and bridging per hem  6/23 hem: postive PF4 ab, confirmatory serotonin release assay likely also positive, confirming HIT, c/w argatroban. would clarify with cardiology appropriateness of full AC before planned EVELINA. Ideally, patient would not have anticoagulation stopped at any point, as HIT is a prothrombotic phenomenon. dose coumadin when plt above 150 then continue both argatroban and coumadin until INR above 4, once above 4 d/c argatroban and repeat INR 4 hr laters, if thereaputic INR 2.5-3.5 then can d/c argatroban gtt if not restart gtt  6/23 med: f/u hem c/s. no coumadin until plt above 150. awaiting EVELINA. wound care c/s placed f/u  6/23 wound care: Anterior 4-5th Intercoastal Space area- clean with Saf Clens. Pat dry. Apply Liquid barrier film to periwound skin. Apply Medihoney gel to wound base. Cover with foam with borders. Change daily. Left groin-Gently Clean with NS. Apply Liquid barrier film to periwound skin. Cover wound base with Aquacel AG. Cover with foam with borders. Change daily. d/w vascualr regarding cannulated open wound   6/24 Cardio: Mitral valve disorder.  Plan: F/U echocardiogram in one week.  Continue argatroban gtt as per protocol.  Platelet count improved..    Eventual anticoagulation with warfarin for INR goal 2.5 to 3.5 if patient is stable without evidence of bleeding in setting of thrombocytopenia.   ·  Aphasia.  Plan: Supportive management for stroke.  Aspirin held, on statin.   ·  Essential hypertension.  Plan: BPs acceptable.   ·  HLD (hyperlipidemia).  Plan: On statin.   · Anticoagulation management encounter.  Plan: Started on argatroban gtt for acute drop in platelet count. HIT Ab +.   Thrombocytopenia.    6/24 Med: 36 y/o female with a PMHx of severe MR S/P MVR at [facility] on 6/7 and discharged on 6/14 (discharged with a subtherapeutic INR on a Lovenox to Coumadin bridge but wasn't educated/didnt understand & never started coumadin), HTN, and HLD presents to ED with aphasia concerning for CVA now with acute worsening of baseline thrombocytopenia, HIT screen positive , NADINE pending, remains therapeutic on argatroban, plt improving.   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     thrombocytopenia 2/2 heparin;  HIT screen +;   Hep serologies and HIV negative (as other causes of thrombocytopenia); also no s/s of infection  -f/u NADINE  -per heme NO COUMADIN until plt >150;   Once plt >150 with c/w argatroban and add warfarin until INR is greater than 4 (on combined treatment), then discontinue argatroban gtt. 4 hours later, repeat INR to check for therapeutic coumadin levels (goal here is 2.5-3.5 given mechanical valve). If therapeutic, permanently discontinue argatroban. If subtherapeutic, restart argatroban and repeat process again when INR is higher on combined therapy (i.e, 4.5)  -Given mechanical MVR, pt will require indefinite anticoagulationthrombocytopenia 2/2 heparin; 58-->97-->112-->123 since changing to argatroban (per d/w OP CT surgeon office pre-op plt 265), no s/s of thrombosis, HIT screen +; Hep serologies and HIV negative (as other causes of thrombocytopenia); also no s/s of infecton  -f/u NADINE  -per heme NO COUMADIN until plt >150; appreciate heme c/s c/w argatroban per their note: Continue argatroban and warfarin administration until INR is greater than 4 (on combined treatment), then discontinue argatroban gtt. 4 hours later, repeat INR to check for therapeutic coumadin levels (goal here is 2.5-3.5 given mechanical valve). If therapeutic, permanently discontinue argatroban. If subtherapeutic, restart argatroban and repeat process again when INR is higher on combined therapy (i.e, 4.5)  -Given mechanical MVR, pt will require indefinite anticoagulation2.: Aphasia.   possible stroke   - given poor health literacy and possible CVA need bridging mary now w/ acute plt drop  will need inpatient bridge until therapeutic   - c/w argatroban gtt   INR goal 2.5-3.5, holding coumadin pending improvement in plt   for future reference valve compatible with MRI per OP CT surgeon.     3. MR (mitral regurgitation).     s/p robotic repair with mechanical valve  continue wound care   f/u with cardio  need repeat echo after 1 week   can be done as out patient   4. Anemia.  likely 2/2 post op status also hemolysis 2/2 valve  can start iron  transfuse <7  likely mild hemorrhoidal bleeding, monitor, bowel regimen.   5. HTN (hypertension).  monitor BP  6.HLD (hyperlipidemia).   continue statin     Psych  -if anxious ativan 1mg prn Q8 hours, no capacity for AMA    6/27/17 > Transfer to ADS service 305A - MAR aware  report given to NOE Molina - hospitalist on call aware 36 y/o female with a PMHx of severe MR S/P mechanical MVR at [facility] on 6/7/17 with Dr. Geri Mueller, HTN and HLD presents to ED with dysphagia since this morning. Pt was found to have severe MR and underwent successful mechanical mitral valve replacement on June 7, 2017 at [facility] with Dr. Geri Mueller. Pt was hospitalized until June 14, 2017 and was being given Lovenox with a bridge to Coumadin. Pt was apparently discharged with a subtherapeutic INR (as per , INR was less than 2.5) and was given Lovenox injections. Pt had a visiting nurse come to the house on Margarette 15, 2017 (the day after discharge) to educate the patient on how to inject the Lovenox. Pt and her  have been able to self inject the Lovenox since then but patient has not been taking Coumadin. As per , they were told to start Coumadin after her Lovenox injections were completed. Last night, patient's  was playing with their 4 year old toddler and he feel off his father's shoulders. When this happened, pt became very scared, startled and started shaking uncontrollably. Pt subsequently lost consciousness while sitting on the sofa. Pt did not have any head or bodily trauma.  called 911 immediately. Pt was unconsciousness for over 5 minutes until EMS came. EMS was able to shake her and wake her up, but since then she has been completely aphasic and expresses generalized weakness. Pt is independent at baseline. Since she has been here,  reports that her aphasia is improving but she is not back at her baseline functionality. Pt denies fever, chills, recent travel, headache, dizziness, visual deficits, tinnitus, chest pain, shortness of breath, palpitations, abdominal pain, N/V/D/C, hematochezia, melena, dysuria, hematuria, urinary/fecal incontinence, aura, tongue lacerations. Upon arrival to ED, EKG revealed NSR at 94 bpm, Q wave III, TWI III, AVF. Pt ruled out for ACS with two sets of negative cardiac enzymes. CT head revealed no acute intracranial hemorrhage, mass effect, or shift. H/H: 8.0/26.6. Platelets: 109. Pt is now admitted to telemetry.     Patient Had CTH and CTA which were negative, no MRI as concerns for MVR (however later confirmed MRI safe however symptoms had resolved).  Soon after admission patient was without aphasia or other neurological deficit however while bridging heparin/coumadin for MVR was noted to have acute worsening of baseline thrombocytopenia from 100s to 58, heparin immediately discontinued, started on argatroban, seen by hematology. Patient was continued on argatroban until platelets recovered to greater than 150 and then was restarted on coumadin/argatroban bridge.  Patient with some coumadin resistance requiring high doses.  On 6/30 INR 2.97 argatroban held and INR remeasured and was 2.5.  TTE with possible small thrombus, discussed with OP CT surgeon surgical closure device vs small thrombus but would not changed management, would just recommend continuation of anticoagulation.  LE doppler negative for DVT.  In regards to blood pressure remained normal off meds.   Had one chest wall wound and L groin wound from surgery that were dehisced, seen by wound care, appeared to be healing.  Patient is now stable for discharge.  Will take coumadin 13 at night x 3 days and has PCP appt on July 3rd @ 10 am.  Both CT surgeon and PCP updated regarding hospital course, HIT diagnosis, and planned discharge.  PT recommend home PT.        wound care: Anterior 4-5th Intercoastal Space area- clean with Saf Clens. Pat dry. Apply Liquid barrier film to periwound skin. Apply Medihoney gel to wound base. Cover with foam with borders. Change daily. Left groin-Gently Clean with NS. Apply Liquid barrier film to periwound skin. Cover wound base with Aquacel AG. Cover with foam with borders. 38 y/o female with a PMHx of severe MR S/P mechanical MVR at [facility] on 6/7/17 with Dr. Geri Mueller, HTN and HLD presents to ED with dysphagia since this morning. Pt was found to have severe MR and underwent successful mechanical mitral valve replacement on June 7, 2017 at [facility] with Dr. Geri Mueller. Pt was hospitalized until June 14, 2017 and was being given Lovenox with a bridge to Coumadin. Pt was apparently discharged with a subtherapeutic INR (as per , INR was less than 2.5) and was given Lovenox injections. Pt had a visiting nurse come to the house on Margarette 15, 2017 (the day after discharge) to educate the patient on how to inject the Lovenox. Pt and her  have been able to self inject the Lovenox since then but patient has not been taking Coumadin. As per , they were told to start Coumadin after her Lovenox injections were completed. Last night, patient's  was playing with their 4 year old toddler and he feel off his father's shoulders. When this happened, pt became very scared, startled and started shaking uncontrollably. Pt subsequently lost consciousness while sitting on the sofa. Pt did not have any head or bodily trauma.  called 911 immediately. Pt was unconsciousness for over 5 minutes until EMS came. EMS was able to shake her and wake her up, but since then she has been completely aphasic and expresses generalized weakness. Pt is independent at baseline. Since she has been here,  reports that her aphasia is improving but she is not back at her baseline functionality. Pt denies fever, chills, recent travel, headache, dizziness, visual deficits, tinnitus, chest pain, shortness of breath, palpitations, abdominal pain, N/V/D/C, hematochezia, melena, dysuria, hematuria, urinary/fecal incontinence, aura, tongue lacerations. Upon arrival to ED, EKG revealed NSR at 94 bpm, Q wave III, TWI III, AVF. Pt ruled out for ACS with two sets of negative cardiac enzymes. CT head revealed no acute intracranial hemorrhage, mass effect, or shift. H/H: 8.0/26.6. Platelets: 109. Pt is now admitted to telemetry.     Patient Had CTH and CTA which were negative, no MRI as concerns for MVR (however later confirmed MRI safe however symptoms had resolved).  Soon after admission patient was without aphasia or other neurological deficit however while bridging heparin/coumadin for MVR was noted to have acute worsening of baseline thrombocytopenia from 100s to 58, heparin immediately discontinued, started on argatroban, seen by hematology. HIT screen + and NADINE +, HIT confirmed, no evidence of thrombosis.  Patient and  educated regarding HIT diagnosis and need to avoid all heparin products.  Patient was continued on argatroban until platelets recovered to greater than 150 and then was restarted on coumadin/argatroban bridge.  Patient with some coumadin resistance requiring high doses.  On 6/30 INR 2.97 argatroban drip held and 2 hours later repeat INR remeasured and was 2.5.  TTE with possible small thrombus, discussed with OP CT surgeon surgical closure device vs small thrombus but would not changed management, would just recommend continuation of anticoagulation.  LE doppler negative for DVT.  In regards to blood pressure remained normal off meds.   Had one chest wall wound and L groin wound from surgery that were dehisced, seen by wound care, appeared to be healing.  Patient is now stable for discharge.  Will take coumadin 13 at night x 3 days and has PCP appt on July 3rd @ 10 am.  Both CT surgeon and PCP updated regarding hospital course, HIT diagnosis, and planned discharge.  PT recommend home PT.   Of note patient was very obstinate wanting to leave hospital prior to therapuetic INR achieved even though numerous conversations regarding risk/benefit had thus psychiatry was called to assess for capacity to leave AMA which she lacked due to inability to understand risk/benefit ultimately patient stayed without issue until INR reached goal 2.5. 38 y/o female with a PMHx of severe MR S/P mechanical MVR at [facility] on 6/7/17 with Dr. Geri Mueller, HTN and HLD presents to ED with dysphagia since this morning. Pt was found to have severe MR and underwent successful mechanical mitral valve replacement on June 7, 2017 at [facility] with Dr. Geri Mueller. Pt was hospitalized until June 14, 2017 and was being given Lovenox with a bridge to Coumadin. Pt was apparently discharged with a subtherapeutic INR (as per , INR was less than 2.5) and was given Lovenox injections. Pt had a visiting nurse come to the house on Margarette 15, 2017 (the day after discharge) to educate the patient on how to inject the Lovenox. Pt and her  have been able to self inject the Lovenox since then but patient has not been taking Coumadin. As per , they were told to start Coumadin after her Lovenox injections were completed. Last night, patient's  was playing with their 4 year old toddler and he feel off his father's shoulders. When this happened, pt became very scared, startled and started shaking uncontrollably. Pt subsequently lost consciousness while sitting on the sofa. Pt did not have any head or bodily trauma.  called 911 immediately. Pt was unconsciousness for over 5 minutes until EMS came. EMS was able to shake her and wake her up, but since then she has been completely aphasic and expresses generalized weakness. Pt is independent at baseline. Since she has been here,  reports that her aphasia is improving but she is not back at her baseline functionality. Pt denies fever, chills, recent travel, headache, dizziness, visual deficits, tinnitus, chest pain, shortness of breath, palpitations, abdominal pain, N/V/D/C, hematochezia, melena, dysuria, hematuria, urinary/fecal incontinence, aura, tongue lacerations. Upon arrival to ED, EKG revealed NSR at 94 bpm, Q wave III, TWI III, AVF. Pt ruled out for ACS with two sets of negative cardiac enzymes. CT head revealed no acute intracranial hemorrhage, mass effect, or shift. H/H: 8.0/26.6. Platelets: 109. Pt is now admitted to telemetry.     Patient Had CTH and CTA which were negative, no MRI as concerns for MVR (however later confirmed MRI safe however symptoms had resolved).  Soon after admission patient was without aphasia or other neurological deficit however while bridging heparin/coumadin for MVR was noted to have acute worsening of baseline thrombocytopenia from 100s to 58, heparin immediately discontinued, started on argatroban, seen by hematology. HIT screen + and NADINE +, HIT confirmed, no evidence of thrombosis.  Patient and  educated regarding HIT diagnosis and need to avoid all heparin products.  Patient was continued on argatroban until platelets recovered to greater than 150 and then was restarted on coumadin/argatroban bridge.  Patient with some coumadin resistance requiring high doses.  On 6/30 INR 2.97 argatroban drip held and 4 hours later repeat INR remeasured and was 2.5.  TTE with possible small thrombus, discussed with OP CT surgeon surgical closure device vs small thrombus but would not changed management, would just recommend continuation of anticoagulation.  LE doppler negative for DVT.  In regards to blood pressure remained normal off meds.   Had one chest wall wound and L groin wound from surgery that were dehisced, seen by wound care, appeared to be healing.  Patient is now stable for discharge.  Will take coumadin 13 at night x 3 days and has PCP appt on July 3rd @ 10 am.  Both CT surgeon and PCP updated regarding hospital course, HIT diagnosis, and planned discharge.  PT recommend home PT.       Of note patient was very obstinate wanting to leave hospital prior to therapeutic INR achieved even though numerous conversations regarding risk/benefit had occurred (citing prior long hospital, inconvenience, boredom, and missing her 5 yo child) thus psychiatry was called to assess for capacity to leave AMA which she lacked due to inability to understand risk/benefit ultimately patient stayed without issue until INR reached goal 2.5.

## 2017-06-27 NOTE — PROGRESS NOTE ADULT - ASSESSMENT
36 y/o female with a PMHx of severe MR S/P MVR at [facility] on 6/7, HTN, and HLD presents to ED with aphasia concerning for CVA now with acute worsening of baseline thrombocytopenia, HIT screen positive , NADINE positive confirming HIT, remains therapeutic on argatroban, plt improving, now on coumadin bridge.

## 2017-06-28 LAB
APTT BLD: 59.4 SEC — HIGH (ref 27.5–37.4)
HCT VFR BLD CALC: 30.3 % — LOW (ref 34.5–45)
HGB BLD-MCNC: 9 G/DL — LOW (ref 11.5–15.5)
INR BLD: 1.86 — HIGH (ref 0.88–1.17)
MCHC RBC-ENTMCNC: 24.6 PG — LOW (ref 27–34)
MCHC RBC-ENTMCNC: 29.7 % — LOW (ref 32–36)
MCV RBC AUTO: 82.8 FL — SIGNIFICANT CHANGE UP (ref 80–100)
PLATELET # BLD AUTO: 269 K/UL — SIGNIFICANT CHANGE UP (ref 150–400)
PMV BLD: 10.1 FL — SIGNIFICANT CHANGE UP (ref 7–13)
PROTHROM AB SERPL-ACNC: 21.1 SEC — HIGH (ref 9.8–13.1)
RBC # BLD: 3.66 M/UL — LOW (ref 3.8–5.2)
RBC # FLD: 14.5 % — SIGNIFICANT CHANGE UP (ref 10.3–14.5)
WBC # BLD: 7.71 K/UL — SIGNIFICANT CHANGE UP (ref 3.8–10.5)
WBC # FLD AUTO: 7.71 K/UL — SIGNIFICANT CHANGE UP (ref 3.8–10.5)

## 2017-06-28 PROCEDURE — 99233 SBSQ HOSP IP/OBS HIGH 50: CPT

## 2017-06-28 PROCEDURE — 99232 SBSQ HOSP IP/OBS MODERATE 35: CPT

## 2017-06-28 RX ORDER — WARFARIN SODIUM 2.5 MG/1
11 TABLET ORAL ONCE
Qty: 0 | Refills: 0 | Status: DISCONTINUED | OUTPATIENT
Start: 2017-06-28 | End: 2017-06-28

## 2017-06-28 RX ORDER — WARFARIN SODIUM 2.5 MG/1
15 TABLET ORAL ONCE
Qty: 0 | Refills: 0 | Status: COMPLETED | OUTPATIENT
Start: 2017-06-28 | End: 2017-06-28

## 2017-06-28 RX ADMIN — SENNA PLUS 2 TABLET(S): 8.6 TABLET ORAL at 22:24

## 2017-06-28 RX ADMIN — ARGATROBAN 9.58 MICROGRAM(S)/KG/MIN: 50 INJECTION, SOLUTION INTRAVENOUS at 14:08

## 2017-06-28 RX ADMIN — WARFARIN SODIUM 15 MILLIGRAM(S): 2.5 TABLET ORAL at 17:38

## 2017-06-28 RX ADMIN — Medication 650 MILLIGRAM(S): at 18:15

## 2017-06-28 RX ADMIN — Medication 650 MILLIGRAM(S): at 17:40

## 2017-06-28 RX ADMIN — Medication 100 MILLIGRAM(S): at 06:25

## 2017-06-28 RX ADMIN — ATORVASTATIN CALCIUM 40 MILLIGRAM(S): 80 TABLET, FILM COATED ORAL at 22:24

## 2017-06-28 RX ADMIN — Medication 100 MILLIGRAM(S): at 22:24

## 2017-06-28 RX ADMIN — Medication 100 MILLIGRAM(S): at 14:07

## 2017-06-28 RX ADMIN — POLYETHYLENE GLYCOL 3350 17 GRAM(S): 17 POWDER, FOR SOLUTION ORAL at 14:07

## 2017-06-28 RX ADMIN — Medication 325 MILLIGRAM(S): at 14:07

## 2017-06-28 NOTE — PROGRESS NOTE ADULT - SUBJECTIVE AND OBJECTIVE BOX
Patient is a 37y old  Female who presents with a chief complaint of Aphasia    SUBJECTIVE / OVERNIGHT EVENTS:    No complaints.  No bleeding.  No chest pain, no SOB, slept well.      MEDICATIONS  (STANDING):  atorvastatin 40 milliGRAM(s) Oral at bedtime  ferrous    sulfate 325 milliGRAM(s) Oral daily  argatroban Infusion 2 MICROgram(s)/kG/Min (9.576 mL/Hr) IV Continuous <Continuous>  docusate sodium 100 milliGRAM(s) Oral three times a day  senna 2 Tablet(s) Oral at bedtime  polyethylene glycol 3350 17 Gram(s) Oral daily  warfarin 11 milliGRAM(s) Oral once    MEDICATIONS  (PRN):  acetaminophen   Tablet. 650 milliGRAM(s) Oral every 6 hours PRN Mild Pain (1 - 3)  phenylephrine 0.25% Suppository 1 Suppository(s) Rectal four times a day PRN hemorrhoidall pain/ irritation  LORazepam     Tablet 1 milliGRAM(s) Oral every 8 hours PRN Anxiety      Vital Signs Last 24 Hrs  T(C): 37.1 (06-28-17 @ 06:23), Max: 37.1 (06-28-17 @ 06:23)  HR: 87 (06-28-17 @ 06:23) (85 - 98)  BP: 106/65 (06-28-17 @ 06:23) (106/65 - 110/65)  RR: 17 (06-28-17 @ 06:23) (17 - 18)  SpO2: 100% (06-28-17 @ 06:23) (100% - 100%)      PHYSICAL EXAM:  GENERAL: NAD, well-developed  HEAD:  Atraumatic, Normocephalic  EYES: EOMI, PERRLA, conjunctiva and sclera clear  NECK: Supple, No JVD  CHEST/LUNG: Clear to auscultation bilaterally; No wheeze  HEART: Regular, +click loudest in MV area   ABDOMEN: Soft, Nontender, Nondistended; Bowel sounds present  EXTREMITIES:   warm and well perfused, No clubbing, cyanosis, or edema  PSYCH: AAOx3  NEUROLOGY: non-focal  SKIN: R chest wall surgical incisions induration but no fluctuance, one slightly open without e/o infection, R groin incision with sq induration, no fluctuance, L groin wound appears to be dehisced with no purulence, + granulation tissue       LABS:                        9.0    7.71  )-----------( 269      ( 28 Jun 2017 06:15 )             30.3     PT/INR - ( 28 Jun 2017 06:15 )   PT: 21.1 SEC;   INR: 1.86     PTT - ( 28 Jun 2017 06:15 )  PTT:59.4 SEC        Consultant(s) Notes Reviewed:  cards, psych    Care Discussed with Consultants/Other Providers: psych

## 2017-06-28 NOTE — PROGRESS NOTE ADULT - SUBJECTIVE AND OBJECTIVE BOX
Cardiology/Vascular Medicine Inpatient Progress Note    Asked by Dr. Davis to see patient re: anticoagulation management in setting of recent MVR.    No new complaints.  No new neurologic or cardiac issues.    Continuing with argatroban and warfarin.  INR goal is 2.5 to 3.5.    PT/INR - ( 28 Jun 2017 06:15 )   PT: 21.1 SEC;   INR: 1.86     PTT - ( 28 Jun 2017 06:15 )  PTT:59.4 SEC    Vital Signs Last 24 Hrs  T(C): 36.8 (28 Jun 2017 14:05), Max: 37.1 (28 Jun 2017 06:23)  T(F): 98.2 (28 Jun 2017 14:05), Max: 98.8 (28 Jun 2017 06:23)  HR: 91 (28 Jun 2017 14:05) (85 - 98)  BP: 103/66 (28 Jun 2017 14:05) (103/66 - 110/65)  BP(mean): --  RR: 17 (28 Jun 2017 14:05) (17 - 18)  SpO2: 100% (28 Jun 2017 14:05) (100% - 100%)    Appearance: Normal	  HEENT:   Normal oral mucosa, PERRL, EOMI	  Lymphatic: No lymphadenopathy  Cardiovascular: Normal S1 S2, No JVD, No murmurs, +crisp click, No edema  Respiratory: Lungs clear to auscultation	  Psychiatry: A & O x 3, Mood & affect appropriate  Gastrointestinal:  Soft, Non-tender, + BS	  Skin: No rashes, No ecchymoses, No cyanosis	  Neurologic: Non-focal  Extremities: Normal range of motion, No clubbing, cyanosis or edema  Vascular: Peripheral pulses palpable 2+ bilaterally    MEDICATIONS  (STANDING):  atorvastatin 40 milliGRAM(s) Oral at bedtime  ferrous    sulfate 325 milliGRAM(s) Oral daily  argatroban Infusion 2 MICROgram(s)/kG/Min (9.576 mL/Hr) IV Continuous <Continuous>  docusate sodium 100 milliGRAM(s) Oral three times a day  senna 2 Tablet(s) Oral at bedtime  polyethylene glycol 3350 17 Gram(s) Oral daily    MEDICATIONS  (PRN):  acetaminophen   Tablet. 650 milliGRAM(s) Oral every 6 hours PRN Mild Pain (1 - 3)  phenylephrine 0.25% Suppository 1 Suppository(s) Rectal four times a day PRN hemorrhoidall pain/ irritation  LORazepam     Tablet 1 milliGRAM(s) Oral every 8 hours PRN Anxiety      LABS:	 	                          9.0    7.71  )-----------( 269      ( 28 Jun 2017 06:15 )             30.3     PT/INR - ( 28 Jun 2017 06:15 )   PT: 21.1 SEC;   INR: 1.86     PTT - ( 28 Jun 2017 06:15 )  PTT:59.4 SEC      Patient name: ДМИТРИЙ ALY  YOB: 1980   Age: 37 (F)   MR#: [id number]  Study Date: 6/22/2017  Location: [facility] Sonographer: Sonia Craft [facility]  Study quality: Technically good  Referring Physician: Nisha Zimmer MD / Luis Malhotra MD  Blood Pressure: 118/71 mmHg  Height: 150 cm  Weight: 80 kg  BSA: 1.8 m2  ------------------------------------------------------------------------  PROCEDURE: Transthoracic echocardiogram with 2-D, M-Mode  and complete spectral and color flow Doppler.  INDICATION: Nonrheumatic mitral valve disorder, unspecified  (I34.9)  ------------------------------------------------------------------------  DIMENSIONS:  Dimensions:     Normal Values:  LA:       ---     2.0 - 4.0 cm  Ao:     2.5 cm    2.0 - 3.8 cm  SEPTUM: 0.7 cm    0.6 - 1.2 cm  PWT:    1.0 cm    0.6 - 1.1 cm  LVIDd:  4.4 cm    3.0 - 5.6 cm  LVIDs:    ---     1.8 - 4.0 cm  Derived Variables:  LVMI: 68 g/m2  RWT: 0.45  ------------------------------------------------------------------------  OBSERVATIONS:  Mitral Valve: Mechanical prosthetic mitral valve  replacement. The leaflets are not well visualized. Minimal  mitral regurgitation. Mean transmitral valve gradient  equals 4 mm Hg, which is probably normal in the setting of  a prosthetic valve.  Aortic Root: Normal aortic root.  Aortic Valve: Calcified trileaflet aortic valve with normal  opening.  Left Atrium: Normal left atrium.  LA volume index = 29  cc/m2.  Left Ventricle: Normal left ventricular systolic function.  No segmental wall motion abnormalities. Echodensity seen  within the left ventricle is probably calcified chord.  Right Heart: Normal right atrium. Normal right ventricular  size and function. Normal tricuspid valve. Mild tricuspid  regurgitation. Normal pulmonic valve. Minimal pulmonic  regurgitation.  Pericardium/PleuraNormal pericardium with no pericardial  effusion.  Hemodynamic: Estimated right ventricular systolic pressure  equals 36 mm Hg, assuming right atrial pressure equals 10  mm Hg, consistent with borderline pulmonary hypertension.  ------------------------------------------------------------------------  CONCLUSIONS:  1. Mechanical prosthetic mitral valve replacement. The  leaflets are not well visualized. Minimal mitral  regurgitation. Mean transmitral valve gradient equals 4 mm  Hg, which is probably normal in the setting of a prosthetic  valve.  2. Calcified trileaflet aortic valve with normal opening.  3. Normal left ventricular systolic function. No segmental  wall motion abnormalities. Echodensity seen within the left  ventricle is probably calcified chord.  4. Normal right ventricular size and function.  5. Estimated pulmonary artery systolic pressure equals 36  mm Hg, assuming right atrial pressure equals 10  mm Hg,  consistent with borderline pulmonary hypertension.  No obvious cardiac source of aphasia is identified, consider  EVELINA for further workup, if clinically warranted.  ------------------------------------------------------------------------  Confirmed on  6/24/2017 - 16:36:07 by RONNIE Wolff  ------------------------------------------------------------------------        EXAM:  CT BRAIN        PROCEDURE DATE:  Jun 19 2017     INTERPRETATION:  HISTORY: Fall from standing. Not talking.    Technique: CT of the head was performed without contrast.    Multiple contiguous axial images were acquired from the skullbaseto the   vertex without the administration of intravenous contrast.  Coronal and   sagittal reformations were made.    COMPARISON: CT head dated 6/18/2017.    FINDINGS:  The ventricles and sulci are within normal limits. There are no areas of   abnormal attenuation within the brain parenchyma. There is no   intraparenchymal hematoma, mass effect or midline shift. No abnormal   extra-axial fluid collections are present. There is no evidence of acute   transcortical territorial infarction.    The calvarium is intact. The visualized intraorbital compartments,   paranasal sinuses and mastoid complexes appear free of acute disease.    IMPRESSION:  No acute intracranial hemorrhage.  No evidence for acute transcortical territorial infarction.      CHEO OTERO M.D., ATTENDING RADIOLOGIST  This document has been electronically signed. Jun 19 2017 11:48AM

## 2017-06-28 NOTE — PROGRESS NOTE ADULT - PROBLEM SELECTOR PLAN 1
due to HIT, plt recovered to presurgical baseline on argatroban, HIT screen + and NADINE +; Hep serologies/HIV negative  -c/w argatroban & warfarin until INR is greater than 4 (on combined treatment), then discontinue argatroban gtt. 4 hours later, repeat INR to check for therapeutic coumadin levels (goal here is 2.5-3.5 given mechanical valve). If therapeutic, permanently discontinue argatroban. If subtherapeutic, restart argatroban and repeat process again when INR is higher on combined therapy (i.e, 4.5)  - coumadin 11 mg today, daily INR  -Given mechanical MVR, pt will require indefinite anticoagulation  -psych involved as patient/ state they want to leave, seem to understand superficially risk/benefit but unclear if actually understand overall consequences of subtherapuetic INR in context of valve; pt lacks capacity to leave AMA due to HIT, plt recovered to presurgical baseline on argatroban, HIT screen + and NADINE +; Hep serologies/HIV negative  -c/w argatroban & warfarin until INR is greater than 4 (on combined treatment), then discontinue argatroban gtt. 4 hours later, repeat INR to check for therapeutic coumadin levels (goal here is 2.5-3.5 given mechanical valve). If therapeutic, permanently discontinue argatroban. If subtherapeutic, restart argatroban and repeat process again when INR is higher on combined therapy (i.e, 4.5)  - coumadin 15 mg today, daily INR  -Given mechanical MVR, pt will require indefinite anticoagulation  -psych involved as patient/ state they want to leave, seem to understand superficially risk/benefit but unclear if actually understand overall consequences of subtherapuetic INR in context of valve; pt lacks capacity to leave AMA

## 2017-06-28 NOTE — PROGRESS NOTE ADULT - ASSESSMENT
38 y/o female with a PMHx of severe MR S/P MVR at [facility] on 6/7, HTN, and HLD presents to ED with aphasia concerning for CVA now with acute worsening of baseline thrombocytopenia, HIT screen positive , NADINE positive confirming HIT, remains therapeutic on argatroban, plt improving, now on coumadin bridge.

## 2017-06-28 NOTE — PROGRESS NOTE ADULT - PROBLEM SELECTOR PLAN 4
likely 2/2 post op status also hemolysis 2/2 valve  c/w iron, improving H/H and stable at 9  transfuse <7   bowel regimen

## 2017-06-29 LAB
APTT BLD: 65.8 SEC — HIGH (ref 27.5–37.4)
BASOPHILS # BLD AUTO: 0.01 K/UL — SIGNIFICANT CHANGE UP (ref 0–0.2)
BASOPHILS NFR BLD AUTO: 0.1 % — SIGNIFICANT CHANGE UP (ref 0–2)
EOSINOPHIL # BLD AUTO: 0.08 K/UL — SIGNIFICANT CHANGE UP (ref 0–0.5)
EOSINOPHIL NFR BLD AUTO: 1.1 % — SIGNIFICANT CHANGE UP (ref 0–6)
HCT VFR BLD CALC: 28.3 % — LOW (ref 34.5–45)
HGB BLD-MCNC: 8.8 G/DL — LOW (ref 11.5–15.5)
IMM GRANULOCYTES # BLD AUTO: 0.04 # — SIGNIFICANT CHANGE UP
IMM GRANULOCYTES NFR BLD AUTO: 0.6 % — SIGNIFICANT CHANGE UP (ref 0–1.5)
INR BLD: 2.48 — HIGH (ref 0.88–1.17)
LYMPHOCYTES # BLD AUTO: 3.03 K/UL — SIGNIFICANT CHANGE UP (ref 1–3.3)
LYMPHOCYTES # BLD AUTO: 43.3 % — SIGNIFICANT CHANGE UP (ref 13–44)
MCHC RBC-ENTMCNC: 25.4 PG — LOW (ref 27–34)
MCHC RBC-ENTMCNC: 31.1 % — LOW (ref 32–36)
MCV RBC AUTO: 81.8 FL — SIGNIFICANT CHANGE UP (ref 80–100)
MONOCYTES # BLD AUTO: 0.38 K/UL — SIGNIFICANT CHANGE UP (ref 0–0.9)
MONOCYTES NFR BLD AUTO: 5.4 % — SIGNIFICANT CHANGE UP (ref 2–14)
NEUTROPHILS # BLD AUTO: 3.46 K/UL — SIGNIFICANT CHANGE UP (ref 1.8–7.4)
NEUTROPHILS NFR BLD AUTO: 49.5 % — SIGNIFICANT CHANGE UP (ref 43–77)
NRBC # FLD: 0 — SIGNIFICANT CHANGE UP
PLATELET # BLD AUTO: 248 K/UL — SIGNIFICANT CHANGE UP (ref 150–400)
PMV BLD: 10 FL — SIGNIFICANT CHANGE UP (ref 7–13)
PROTHROM AB SERPL-ACNC: 28.3 SEC — HIGH (ref 9.8–13.1)
RBC # BLD: 3.46 M/UL — LOW (ref 3.8–5.2)
RBC # FLD: 14.4 % — SIGNIFICANT CHANGE UP (ref 10.3–14.5)
WBC # BLD: 7 K/UL — SIGNIFICANT CHANGE UP (ref 3.8–10.5)
WBC # FLD AUTO: 7 K/UL — SIGNIFICANT CHANGE UP (ref 3.8–10.5)

## 2017-06-29 PROCEDURE — 99232 SBSQ HOSP IP/OBS MODERATE 35: CPT

## 2017-06-29 PROCEDURE — 99233 SBSQ HOSP IP/OBS HIGH 50: CPT

## 2017-06-29 RX ORDER — ZOLPIDEM TARTRATE 10 MG/1
5 TABLET ORAL AT BEDTIME
Qty: 0 | Refills: 0 | Status: DISCONTINUED | OUTPATIENT
Start: 2017-06-29 | End: 2017-06-30

## 2017-06-29 RX ORDER — WARFARIN SODIUM 2.5 MG/1
15 TABLET ORAL ONCE
Qty: 0 | Refills: 0 | Status: COMPLETED | OUTPATIENT
Start: 2017-06-29 | End: 2017-06-29

## 2017-06-29 RX ADMIN — SENNA PLUS 2 TABLET(S): 8.6 TABLET ORAL at 22:42

## 2017-06-29 RX ADMIN — ATORVASTATIN CALCIUM 40 MILLIGRAM(S): 80 TABLET, FILM COATED ORAL at 22:42

## 2017-06-29 RX ADMIN — POLYETHYLENE GLYCOL 3350 17 GRAM(S): 17 POWDER, FOR SOLUTION ORAL at 13:12

## 2017-06-29 RX ADMIN — Medication 100 MILLIGRAM(S): at 13:12

## 2017-06-29 RX ADMIN — ARGATROBAN 9.58 MICROGRAM(S)/KG/MIN: 50 INJECTION, SOLUTION INTRAVENOUS at 19:48

## 2017-06-29 RX ADMIN — WARFARIN SODIUM 15 MILLIGRAM(S): 2.5 TABLET ORAL at 18:41

## 2017-06-29 RX ADMIN — PHENYLEPHRINE-SHARK LIVER OIL-MINERAL OIL-PETROLATUM RECTAL OINTMENT 1 SUPPOSITORY(S): at 22:42

## 2017-06-29 RX ADMIN — ARGATROBAN 9.58 MICROGRAM(S)/KG/MIN: 50 INJECTION, SOLUTION INTRAVENOUS at 13:13

## 2017-06-29 RX ADMIN — Medication 325 MILLIGRAM(S): at 13:12

## 2017-06-29 RX ADMIN — Medication 100 MILLIGRAM(S): at 22:42

## 2017-06-29 NOTE — PROGRESS NOTE ADULT - SUBJECTIVE AND OBJECTIVE BOX
Patient is a 37y old  Female who presents with a chief complaint of Aphasia    SUBJECTIVE / OVERNIGHT EVENTS:    Patient reports dry cough and mild pain at surgical site when coughing   No fever, no chills  No focal weakness  Ambulating  Wants to leave     MEDICATIONS  (STANDING):  atorvastatin 40 milliGRAM(s) Oral at bedtime  ferrous    sulfate 325 milliGRAM(s) Oral daily  argatroban Infusion 2 MICROgram(s)/kG/Min (9.576 mL/Hr) IV Continuous <Continuous>  docusate sodium 100 milliGRAM(s) Oral three times a day  senna 2 Tablet(s) Oral at bedtime  polyethylene glycol 3350 17 Gram(s) Oral daily  warfarin 15 milliGRAM(s) Oral once    MEDICATIONS  (PRN):  acetaminophen   Tablet. 650 milliGRAM(s) Oral every 6 hours PRN Mild Pain (1 - 3)  phenylephrine 0.25% Suppository 1 Suppository(s) Rectal four times a day PRN hemorrhoidall pain/ irritation  LORazepam     Tablet 1 milliGRAM(s) Oral every 8 hours PRN Anxiety      Vital Signs Last 24 Hrs  T(C): 36.8 (06-29-17 @ 06:16), Max: 37.2 (06-28-17 @ 21:24)  HR: 77 (06-29-17 @ 06:16) (77 - 91)  BP: 106/63 (06-29-17 @ 06:16) (102/63 - 106/63)  RR: 18 (06-29-17 @ 06:16) (17 - 18)  SpO2: 100% (06-29-17 @ 06:16) (100% - 100%)      PHYSICAL EXAM:  GENERAL: NAD, well-developed  HEAD:  Atraumatic, Normocephalic  EYES: EOMI, PERRLA, conjunctiva and sclera clear  NECK: Supple, No JVD  CHEST/LUNG: Clear to auscultation bilaterally; No wheeze  HEART: Regular, +click loudest in MV area   ABDOMEN: Soft, Nontender, Nondistended; Bowel sounds present  EXTREMITIES:   warm and well perfused, No clubbing, cyanosis, or edema  PSYCH: AAOx3  NEUROLOGY: non-focal  SKIN: R chest wall surgical incisions induration but no fluctuance, one slightly open without e/o infection, R groin incision with sq induration, no fluctuance, L groin wound appears to be dehisced with no purulence, + granulation tissue   LABS:                        8.8    7.00  )-----------( 248      ( 29 Jun 2017 05:12 )             28.3       PT/INR - ( 29 Jun 2017 05:12 )   PT: 28.3 SEC;   INR: 2.48      PTT - ( 29 Jun 2017 05:12 )  PTT:65.8 SEC      Consultant(s) Notes Reviewed:  cards

## 2017-06-29 NOTE — PROGRESS NOTE ADULT - ASSESSMENT
38 y/o female with a PMHx of severe MR S/P MVR at [facility] on 6/7, HTN, and HLD presents to ED with aphasia concerning for CVA now with acute worsening of baseline thrombocytopenia, HIT screen positive , NADINE positive confirming HIT, remains therapeutic on argatroban, plt improving, now on coumadin bridge.

## 2017-06-29 NOTE — PROGRESS NOTE ADULT - PROBLEM SELECTOR PLAN 1
Can f/u outside cardiologist and PMD.    INR 2.48.  OK for discharge with close f/u with PMD and coumadin clinic.

## 2017-06-29 NOTE — PROGRESS NOTE ADULT - SUBJECTIVE AND OBJECTIVE BOX
Cardiology/Vascular Medicine Inpatient Progress Note    Asked by Dr. Davis to see patient re: anticoagulation management in setting of recent MVR.    No new complaints.  No new neurologic or cardiac issues.    Continuing with argatroban and warfarin.  INR goal is 2.5 to 3.5.  INR 2.48 today.  OK for discharge from cardiac perspective with close f/u with coumadin clinic.    PT/INR - ( 29 Jun 2017 05:12 )   PT: 28.3 SEC;   INR: 2.48     PTT - ( 29 Jun 2017 05:12 )  PTT:65.8 SEC    Vital Signs Last 24 Hrs  T(C): 36.8 (29 Jun 2017 06:16), Max: 37.2 (28 Jun 2017 21:24)  T(F): 98.2 (29 Jun 2017 06:16), Max: 99 (28 Jun 2017 21:24)  HR: 77 (29 Jun 2017 06:16) (77 - 91)  BP: 106/63 (29 Jun 2017 06:16) (102/63 - 106/63)  BP(mean): --  RR: 18 (29 Jun 2017 06:16) (17 - 18)  SpO2: 100% (29 Jun 2017 06:16) (100% - 100%)      Appearance: Normal	  HEENT:   Normal oral mucosa, PERRL, EOMI	  Lymphatic: No lymphadenopathy  Cardiovascular: Normal S1 S2, No JVD, No murmurs, +crisp click, No edema  Respiratory: Lungs clear to auscultation	  Psychiatry: A & O x 3, Mood & affect appropriate  Gastrointestinal:  Soft, Non-tender, + BS	  Skin: No rashes, No ecchymoses, No cyanosis	  Neurologic: Non-focal  Extremities: Normal range of motion, No clubbing, cyanosis or edema  Vascular: Peripheral pulses palpable 2+ bilaterally    MEDICATIONS  (STANDING):  atorvastatin 40 milliGRAM(s) Oral at bedtime  ferrous    sulfate 325 milliGRAM(s) Oral daily  argatroban Infusion 2 MICROgram(s)/kG/Min (9.576 mL/Hr) IV Continuous <Continuous>  docusate sodium 100 milliGRAM(s) Oral three times a day  senna 2 Tablet(s) Oral at bedtime  polyethylene glycol 3350 17 Gram(s) Oral daily    MEDICATIONS  (PRN):  acetaminophen   Tablet. 650 milliGRAM(s) Oral every 6 hours PRN Mild Pain (1 - 3)  phenylephrine 0.25% Suppository 1 Suppository(s) Rectal four times a day PRN hemorrhoidall pain/ irritation  LORazepam     Tablet 1 milliGRAM(s) Oral every 8 hours PRN Anxiety      LABS:	 	                                   8.8    7.00  )-----------( 248      ( 29 Jun 2017 05:12 )             28.3         Patient name: ДМИТРИЙ ALY  YOB: 1980   Age: 37 (F)   MR#: [id number]  Study Date: 6/22/2017  [facility] Sonographer: Sonia Craft [facility]  Study quality: Technically good  Referring Physician: Nisha Zimmer MD / Luis Malhotra MD  Blood Pressure: 118/71 mmHg  Height: 150 cm  Weight: 80 kg  BSA: 1.8 m2  ------------------------------------------------------------------------  PROCEDURE: Transthoracic echocardiogram with 2-D, M-Mode  and complete spectral and color flow Doppler.  INDICATION: Nonrheumatic mitral valve disorder, unspecified  (I34.9)  ------------------------------------------------------------------------  DIMENSIONS:  Dimensions:     Normal Values:  LA:       ---     2.0 - 4.0 cm  Ao:     2.5 cm    2.0 - 3.8 cm  SEPTUM: 0.7 cm    0.6 - 1.2 cm  PWT:    1.0 cm    0.6 - 1.1 cm  LVIDd:  4.4 cm    3.0 - 5.6 cm  LVIDs:    ---     1.8 - 4.0 cm  Derived Variables:  LVMI: 68 g/m2  RWT: 0.45  ------------------------------------------------------------------------  OBSERVATIONS:  Mitral Valve: Mechanical prosthetic mitral valve  replacement. The leaflets are not well visualized. Minimal  mitral regurgitation. Mean transmitral valve gradient  equals 4 mm Hg, which is probably normal in the setting of  a prosthetic valve.  Aortic Root: Normal aortic root.  Aortic Valve: Calcified trileaflet aortic valve with normal  opening.  Left Atrium: Normal left atrium.  LA volume index = 29  cc/m2.  Left Ventricle: Normal left ventricular systolic function.  No segmental wall motion abnormalities. Echodensity seen  within the left ventricle is probably calcified chord.  Right Heart: Normal right atrium. Normal right ventricular  size and function. Normal tricuspid valve. Mild tricuspid  regurgitation. Normal pulmonic valve. Minimal pulmonic  regurgitation.  Pericardium/PleuraNormal pericardium with no pericardial  effusion.  Hemodynamic: Estimated right ventricular systolic pressure  equals 36 mm Hg, assuming right atrial pressure equals 10  mm Hg, consistent with borderline pulmonary hypertension.  ------------------------------------------------------------------------  CONCLUSIONS:  1. Mechanical prosthetic mitral valve replacement. The  leaflets are not well visualized. Minimal mitral  regurgitation. Mean transmitral valve gradient equals 4 mm  Hg, which is probably normal in the setting of a prosthetic  valve.  2. Calcified trileaflet aortic valve with normal opening.  3. Normal left ventricular systolic function. No segmental  wall motion abnormalities. Echodensity seen within the left  ventricle is probably calcified chord.  4. Normal right ventricular size and function.  5. Estimated pulmonary artery systolic pressure equals 36  mm Hg, assuming right atrial pressure equals 10  mm Hg,  consistent with borderline pulmonary hypertension.  No obvious cardiac source of aphasia is identified, consider  EVELINA for further workup, if clinically warranted.  ------------------------------------------------------------------------  Confirmed on  6/24/2017 - 16:36:07 by Chandana Tran M.D. RPVI  ------------------------------------------------------------------------        EXAM:  CT BRAIN        PROCEDURE DATE:  Jun 19 2017     INTERPRETATION:  HISTORY: Fall from standing. Not talking.    Technique: CT of the head was performed without contrast.    Multiple contiguous axial images were acquired from the skullbaseto the   vertex without the administration of intravenous contrast.  Coronal and   sagittal reformations were made.    COMPARISON: CT head dated 6/18/2017.    FINDINGS:  The ventricles and sulci are within normal limits. There are no areas of   abnormal attenuation within the brain parenchyma. There is no   intraparenchymal hematoma, mass effect or midline shift. No abnormal   extra-axial fluid collections are present. There is no evidence of acute   transcortical territorial infarction.    The calvarium is intact. The visualized intraorbital compartments,   paranasal sinuses and mastoid complexes appear free of acute disease.    IMPRESSION:  No acute intracranial hemorrhage.  No evidence for acute transcortical territorial infarction.      CHEO OTERO M.D., ATTENDING RADIOLOGIST  This document has been electronically signed. Jun 19 2017 11:48AM

## 2017-06-29 NOTE — PROGRESS NOTE ADULT - PROBLEM SELECTOR PLAN 1
due to HIT, plt recovered to presurgical baseline on argatroban, HIT screen + and NADINE +; Hep serologies/HIV negative  -c/w argatroban & warfarin until INR is greater than 4 (on combined treatment), then discontinue argatroban gtt. 4 hours later, repeat INR to check for therapeutic coumadin levels (goal here is 2.5-3.5 given mechanical valve). If therapeutic, permanently discontinue argatroban. If subtherapeutic, restart argatroban and repeat process again when INR is higher on combined therapy (i.e, 4.5)  - coumadin 15 mg today, daily INR  -Given mechanical MVR, pt will require indefinite anticoagulation  -psych involved; pt lacks capacity to leave AMA

## 2017-06-30 VITALS
TEMPERATURE: 99 F | RESPIRATION RATE: 17 BRPM | DIASTOLIC BLOOD PRESSURE: 81 MMHG | OXYGEN SATURATION: 100 % | HEART RATE: 94 BPM | SYSTOLIC BLOOD PRESSURE: 103 MMHG

## 2017-06-30 DIAGNOSIS — K64.8 OTHER HEMORRHOIDS: ICD-10-CM

## 2017-06-30 LAB
APTT BLD: 65.8 SEC — HIGH (ref 27.5–37.4)
INR BLD: 2.52 — HIGH (ref 0.88–1.17)
INR BLD: 2.97 — HIGH (ref 0.88–1.17)
PROTHROM AB SERPL-ACNC: 28.8 SEC — HIGH (ref 9.8–13.1)
PROTHROM AB SERPL-ACNC: 34 SEC — HIGH (ref 9.8–13.1)

## 2017-06-30 PROCEDURE — 99239 HOSP IP/OBS DSCHRG MGMT >30: CPT

## 2017-06-30 PROCEDURE — 99232 SBSQ HOSP IP/OBS MODERATE 35: CPT

## 2017-06-30 RX ORDER — GABAPENTIN 400 MG/1
1 CAPSULE ORAL
Qty: 0 | Refills: 0 | COMMUNITY

## 2017-06-30 RX ORDER — ENOXAPARIN SODIUM 100 MG/ML
0 INJECTION SUBCUTANEOUS
Qty: 0 | Refills: 0 | COMMUNITY

## 2017-06-30 RX ORDER — FUROSEMIDE 40 MG
1 TABLET ORAL
Qty: 0 | Refills: 0 | COMMUNITY

## 2017-06-30 RX ORDER — WARFARIN SODIUM 2.5 MG/1
13 TABLET ORAL ONCE
Qty: 0 | Refills: 0 | Status: DISCONTINUED | OUTPATIENT
Start: 2017-06-30 | End: 2017-06-30

## 2017-06-30 RX ORDER — WARFARIN SODIUM 2.5 MG/1
2 TABLET ORAL
Qty: 0 | Refills: 0 | COMMUNITY
Start: 2017-06-30

## 2017-06-30 RX ORDER — WARFARIN SODIUM 2.5 MG/1
3 TABLET ORAL
Qty: 9 | Refills: 0 | OUTPATIENT
Start: 2017-06-30 | End: 2017-07-03

## 2017-06-30 RX ORDER — WARFARIN SODIUM 2.5 MG/1
15 TABLET ORAL ONCE
Qty: 0 | Refills: 0 | Status: DISCONTINUED | OUTPATIENT
Start: 2017-06-30 | End: 2017-06-30

## 2017-06-30 RX ORDER — FERROUS SULFATE 325(65) MG
1 TABLET ORAL
Qty: 30 | Refills: 0 | OUTPATIENT
Start: 2017-06-30 | End: 2017-07-30

## 2017-06-30 RX ORDER — WARFARIN SODIUM 2.5 MG/1
1 TABLET ORAL
Qty: 0 | Refills: 0 | COMMUNITY

## 2017-06-30 RX ORDER — POLYETHYLENE GLYCOL 3350 17 G/17G
17 POWDER, FOR SOLUTION ORAL
Qty: 0 | Refills: 0 | COMMUNITY
Start: 2017-06-30

## 2017-06-30 RX ADMIN — Medication 325 MILLIGRAM(S): at 11:55

## 2017-06-30 NOTE — PROGRESS NOTE ADULT - ATTENDING COMMENTS
D/w  at length on phone x 2.  If wishes to leave would have to be AMA given risk for stroke or other embolic event in context of both post-op, valve replacement and HIT.  For now he is agreement with plan but would like high dose of coumadin given.
Called Dr. Geri Mueller at [facility] to update regarding HIT, ?thrombus, and wound dehiscence.  Appreciated called, will update when discharged.
Possible dc today if INR >2.5 at 12 pm off gtt    40 min spent on dispo planning

## 2017-06-30 NOTE — PROGRESS NOTE ADULT - PROBLEM SELECTOR PLAN 2
history not very c/w stroke however if was embolic stroke would be most likely given recent intervention, symptoms seem to be resolved, pt without aphasia or weakness today  - given poor health literacy and possible CVA need bridging mary now w/ acute plt drop  will need inpatient bridge until therapeutic   - c/w argatroban gtt     - INR goal 2.5-3.5, holding coumadin pending improvement in plt  - for future reference valve compatible with MRI per OP CT surgeon
Supportive management for stroke symptoms.  No neurologic sequelae.  Aspirin held, on statin.
Supportive management for stroke.  Aspirin held, on statin.
Supportive management for stroke.  Aspirin held, on statin.    Plan for EVELINA
history not very c/w stroke however if was embolic stroke would be most likely given recent intervention, symptoms  resolved, pt without aphasia or weakness   - given poor health literacy and possible CVA + mechanical MV + HIT  will need inpatient bridge until therapeutic   - c/w argatroban gtt + coumadin (INR goal 2.5-3.5)  - for future reference valve compatible with MRI per OP CT surgeon
history not very c/w stroke however if was embolic stroke would be most likely given recent intervention, symptoms seem to be resolved, pt without aphasia or weakness today  given poor health literacy and possible CVA need bridging mary now w/ acute plt drop  start argatroban gtt   INR goal 2.5-3.5, holding pending improvement in plt, kailyn driscoll  Called OP CT surgeon at [facility] to update him re: case, states valve is MRI compatible if MRI however unlikely to  at this time
history not very c/w stroke however if was embolic stroke would be most likely given recent intervention, symptoms seem to be resolved, pt without aphasia or weakness today  given poor health literacy and possible CVA need bridging mary now w/ acute plt drop  will need inpatient bridge  - c/w argatroban gtt   - INR goal 2.5-3.5, holding coumadin pending improvement in plt  - for future reference valve compatible with MRI per OP CT surgeon
s/p robotic repair with mechanical valve  ac as above
Supportive management for stroke.  Aspirin held, on statin.

## 2017-06-30 NOTE — PROGRESS NOTE ADULT - PROBLEM SELECTOR PROBLEM 5
Anticoagulation management encounter
Anticoagulation management encounter
HTN (hypertension)
Anticoagulation management encounter
HLD (hyperlipidemia)
HTN (hypertension)
Internal hemorrhoids
Anticoagulation management encounter

## 2017-06-30 NOTE — PROGRESS NOTE ADULT - PROBLEM SELECTOR PROBLEM 4
HLD (hyperlipidemia)
HLD (hyperlipidemia)
Anemia
HLD (hyperlipidemia)
HTN (hypertension)

## 2017-06-30 NOTE — PROGRESS NOTE ADULT - PROBLEM SELECTOR PLAN 1
Can f/u outside cardiologist and PMD.    OK for discharge with close f/u with PMD and coumadin clinic.

## 2017-06-30 NOTE — PROGRESS NOTE ADULT - PROBLEM SELECTOR PROBLEM 6
HLD (hyperlipidemia)
HTN (hypertension)
Need for prophylactic measure
Thrombocytopenia

## 2017-06-30 NOTE — PROGRESS NOTE ADULT - PROBLEM SELECTOR PROBLEM 3
Essential hypertension
Essential hypertension
MR (mitral regurgitation)
Anemia
Essential hypertension
MR (mitral regurgitation)

## 2017-06-30 NOTE — PROGRESS NOTE ADULT - PROBLEM SELECTOR PLAN 6
c/w statin
monitor off meds, at goal
on heparin gtt

## 2017-06-30 NOTE — PROGRESS NOTE ADULT - PROBLEM SELECTOR PROBLEM 2
Aphasia
MR (mitral regurgitation)

## 2017-06-30 NOTE — PROGRESS NOTE ADULT - PROBLEM SELECTOR PLAN 3
s/p robotic repair with mechanical valve  ac as above  local incision care and monitoring, f/u wound care note
BPs acceptable.
likely 2/2 post op status also hemolysis 2/2 valve  can start iron  transfuse <7
s/p robotic repair with mechanical valve  ac as above
s/p robotic repair with mechanical valve  ac as above  local incision care and monitoring
s/p robotic repair with mechanical valve  c/w ac as above   local incision care and monitoring
BPs acceptable.

## 2017-06-30 NOTE — PROGRESS NOTE ADULT - ASSESSMENT
36 y/o female with a PMHx of severe MR S/P MVR at [facility] on 6/7, HTN, and HLD presents to ED with aphasia concerning for CVA now with acute worsening of baseline thrombocytopenia (HIT screen positive , NADINE positive) due to HIT, remains therapeutic on argatroban, plt improving, now on coumadin bridge, INR 2.9 will dc argatroban and check INR.

## 2017-06-30 NOTE — PROGRESS NOTE ADULT - PROBLEM SELECTOR PLAN 1
due to HIT, plt recovered to presurgical baseline on argatroban  - given anxiety to leave will check INR off argatroban today (c/w argatroban & warfarin until INR is greater than 4 (on combined treatment), then discontinue argatroban gtt. 4 hours later, repeat INR to check for therapeutic coumadin levels (goal here is 2.5-3.5 given mechanical valve). If therapeutic, permanently discontinue argatroban. If subtherapeutic, restart argatroban and repeat process again when INR is higher on combined therapy (i.e, 4.5))  -Given mechanical MVR, pt will require indefinite anticoagulation  -psych involved; pt lacks capacity to leave AMA  -coumadin 15 mg today

## 2017-06-30 NOTE — PROGRESS NOTE ADULT - PROBLEM SELECTOR PROBLEM 1
HIT (heparin-induced thrombocytopenia)
Aphasia
HIT (heparin-induced thrombocytopenia)
Mitral valve disorder
Thrombocytopenia
Thrombocytopenia
HIT (heparin-induced thrombocytopenia)

## 2017-06-30 NOTE — PROGRESS NOTE ADULT - SUBJECTIVE AND OBJECTIVE BOX
Cardiology/Vascular Medicine Inpatient Progress Note    Asked by Dr. Davis to see patient re: anticoagulation management in setting of recent MVR.    No new complaints.  No new neurologic or cardiac issues.    INR 2.97 today  OK for discharge from cardiac perspective with close f/u with coumadin clinic.    PT/INR - ( 30 Jun 2017 06:15 )   PT: 34.0 SEC;   INR: 2.97     PTT - ( 30 Jun 2017 06:15 )  PTT:65.8 SEC    Vital Signs Last 24 Hrs  T(C): 37 (30 Jun 2017 07:02), Max: 37.2 (29 Jun 2017 22:14)  T(F): 98.6 (30 Jun 2017 07:02), Max: 98.9 (29 Jun 2017 22:14)  HR: 89 (30 Jun 2017 07:02) (89 - 99)  BP: 106/67 (30 Jun 2017 07:02) (106/67 - 134/82)  BP(mean): --  RR: 18 (30 Jun 2017 07:02) (17 - 18)  SpO2: 99% (30 Jun 2017 07:02) (99% - 100%)    Appearance: Normal	  HEENT:   Normal oral mucosa, PERRL, EOMI	  Lymphatic: No lymphadenopathy  Cardiovascular: Normal S1 S2, No JVD, No murmurs, +crisp click, No edema  Respiratory: Lungs clear to auscultation	  Psychiatry: A & O x 3, Mood & affect appropriate  Gastrointestinal:  Soft, Non-tender, + BS	  Skin: No rashes, No ecchymoses, No cyanosis	  Neurologic: Non-focal  Extremities: Normal range of motion, No clubbing, cyanosis or edema  Vascular: Peripheral pulses palpable 2+ bilaterally    MEDICATIONS  (STANDING):  atorvastatin 40 milliGRAM(s) Oral at bedtime  ferrous    sulfate 325 milliGRAM(s) Oral daily  argatroban Infusion 2 MICROgram(s)/kG/Min (9.576 mL/Hr) IV Continuous <Continuous>  docusate sodium 100 milliGRAM(s) Oral three times a day  senna 2 Tablet(s) Oral at bedtime  polyethylene glycol 3350 17 Gram(s) Oral daily    MEDICATIONS  (PRN):  acetaminophen   Tablet. 650 milliGRAM(s) Oral every 6 hours PRN Mild Pain (1 - 3)  phenylephrine 0.25% Suppository 1 Suppository(s) Rectal four times a day PRN hemorrhoidall pain/ irritation  LORazepam     Tablet 1 milliGRAM(s) Oral every 8 hours PRN Anxiety  zolpidem 5 milliGRAM(s) Oral at bedtime PRN Insomnia      LABS:	 	                                  8.8    7.00  )-----------( 248      ( 29 Jun 2017 05:12 )             28.3           Patient name: ДМИТРИЙ ALY  YOB: 1980   Age: 37 (F)   MR#: [id number]  Study Date: 6/22/2017  Location: [facility] Sonographer: Sonia Craft [facility]  Study quality: Technically good  Referring Physician: Nisha Zimmer MD / Luis Malhotra MD  Blood Pressure: 118/71 mmHg  Height: 150 cm  Weight: 80 kg  BSA: 1.8 m2  ------------------------------------------------------------------------  PROCEDURE: Transthoracic echocardiogram with 2-D, M-Mode  and complete spectral and color flow Doppler.  INDICATION: Nonrheumatic mitral valve disorder, unspecified  (I34.9)  ------------------------------------------------------------------------  DIMENSIONS:  Dimensions:     Normal Values:  LA:       ---     2.0 - 4.0 cm  Ao:     2.5 cm    2.0 - 3.8 cm  SEPTUM: 0.7 cm    0.6 - 1.2 cm  PWT:    1.0 cm    0.6 - 1.1 cm  LVIDd:  4.4 cm    3.0 - 5.6 cm  LVIDs:    ---     1.8 - 4.0 cm  Derived Variables:  LVMI: 68 g/m2  RWT: 0.45  ------------------------------------------------------------------------  OBSERVATIONS:  Mitral Valve: Mechanical prosthetic mitral valve  replacement. The leaflets are not well visualized. Minimal  mitral regurgitation. Mean transmitral valve gradient  equals 4 mm Hg, which is probably normal in the setting of  a prosthetic valve.  Aortic Root: Normal aortic root.  Aortic Valve: Calcified trileaflet aortic valve with normal  opening.  Left Atrium: Normal left atrium.  LA volume index = 29  cc/m2.  Left Ventricle: Normal left ventricular systolic function.  No segmental wall motion abnormalities. Echodensity seen  within the left ventricle is probably calcified chord.  Right Heart: Normal right atrium. Normal right ventricular  size and function. Normal tricuspid valve. Mild tricuspid  regurgitation. Normal pulmonic valve. Minimal pulmonic  regurgitation.  Pericardium/PleuraNormal pericardium with no pericardial  effusion.  Hemodynamic: Estimated right ventricular systolic pressure  equals 36 mm Hg, assuming right atrial pressure equals 10  mm Hg, consistent with borderline pulmonary hypertension.  ------------------------------------------------------------------------  CONCLUSIONS:  1. Mechanical prosthetic mitral valve replacement. The  leaflets are not well visualized. Minimal mitral  regurgitation. Mean transmitral valve gradient equals 4 mm  Hg, which is probably normal in the setting of a prosthetic  valve.  2. Calcified trileaflet aortic valve with normal opening.  3. Normal left ventricular systolic function. No segmental  wall motion abnormalities. Echodensity seen within the left  ventricle is probably calcified chord.  4. Normal right ventricular size and function.  5. Estimated pulmonary artery systolic pressure equals 36  mm Hg, assuming right atrial pressure equals 10  mm Hg,  consistent with borderline pulmonary hypertension.  No obvious cardiac source of aphasia is identified, consider  EVELINA for further workup, if clinically warranted.  ------------------------------------------------------------------------  Confirmed on  6/24/2017 - 16:36:07 by Chandana Tran M.D. RPVI  ------------------------------------------------------------------------        EXAM:  CT BRAIN        PROCEDURE DATE:  Jun 19 2017     INTERPRETATION:  HISTORY: Fall from standing. Not talking.    Technique: CT of the head was performed without contrast.    Multiple contiguous axial images were acquired from the skullbaseto the   vertex without the administration of intravenous contrast.  Coronal and   sagittal reformations were made.    COMPARISON: CT head dated 6/18/2017.    FINDINGS:  The ventricles and sulci are within normal limits. There are no areas of   abnormal attenuation within the brain parenchyma. There is no   intraparenchymal hematoma, mass effect or midline shift. No abnormal   extra-axial fluid collections are present. There is no evidence of acute   transcortical territorial infarction.    The calvarium is intact. The visualized intraorbital compartments,   paranasal sinuses and mastoid complexes appear free of acute disease.    IMPRESSION:  No acute intracranial hemorrhage.  No evidence for acute transcortical territorial infarction.      CHEO OTERO M.D., ATTENDING RADIOLOGIST  This document has been electronically signed. Jun 19 2017 11:48AM

## 2017-06-30 NOTE — PROGRESS NOTE ADULT - SUBJECTIVE AND OBJECTIVE BOX
Patient is a 36 yo Female who presents with a chief complaint of Aphasia    SUBJECTIVE / OVERNIGHT EVENTS:    Patient without SOB or CP.  Still dry cough but no fevers, chills, no worse.  Patient having BM, no diarrhea or blood but states rectal burning after BM, relieved with suppositories.  No focal deficits, ambulating.  Tolerating diet.       MEDICATIONS  (STANDING):  atorvastatin 40 milliGRAM(s) Oral at bedtime  ferrous    sulfate 325 milliGRAM(s) Oral daily  argatroban Infusion 2 MICROgram(s)/kG/Min (9.576 mL/Hr) IV Continuous <Continuous>  docusate sodium 100 milliGRAM(s) Oral three times a day  senna 2 Tablet(s) Oral at bedtime  polyethylene glycol 3350 17 Gram(s) Oral daily    MEDICATIONS  (PRN):  acetaminophen   Tablet. 650 milliGRAM(s) Oral every 6 hours PRN Mild Pain (1 - 3)  phenylephrine 0.25% Suppository 1 Suppository(s) Rectal four times a day PRN hemorrhoidall pain/ irritation  LORazepam     Tablet 1 milliGRAM(s) Oral every 8 hours PRN Anxiety  zolpidem 5 milliGRAM(s) Oral at bedtime PRN Insomnia    Vital Signs Last 24 Hrs  T(C): 37 (06-30-17 @ 07:02), Max: 37.2 (06-29-17 @ 22:14)  HR: 89 (06-30-17 @ 07:02) (89 - 99)  BP: 106/67 (06-30-17 @ 07:02) (106/67 - 134/82)  RR: 18 (06-30-17 @ 07:02) (17 - 18)  SpO2: 99% (06-30-17 @ 07:02) (99% - 100%)    PHYSICAL EXAM:  GENERAL: NAD, well-developed  HEAD:  Atraumatic, Normocephalic  EYES: EOMI, PERRLA, conjunctiva and sclera clear  NECK: Supple, No JVD  CHEST/LUNG: Clear to auscultation bilaterally; No wheeze  HEART: Regular, +click loudest in MV area   ABDOMEN: Soft, Nontender, Nondistended; Bowel sounds present  EXTREMITIES:   warm and well perfused, No clubbing, cyanosis, or edema  PSYCH: AAOx3  NEUROLOGY: non-focal  SKIN: R chest wall surgical incisions induration but no fluctuance, one slightly open without e/o infection (improving skin almost closed), R groin incision with sq induration, no fluctuance, L groin wound appears to be dehisced with no purulence, + granulation tissue (closing slowly)    LABS:                        8.8    7.00  )-----------( 248      ( 29 Jun 2017 05:12 )             28.3       PT/INR - ( 30 Jun 2017 06:15 )   PT: 34.0 SEC;   INR: 2.97     PTT - ( 30 Jun 2017 06:15 )  PTT:65.8 SEC    Consultant(s) Notes Reviewed:  cardiology    Care Discussed with Consultants/Other Providers: PCP (dr. Aguilar)

## 2017-06-30 NOTE — PROGRESS NOTE ADULT - PROBLEM SELECTOR PROBLEM 7
Need for prophylactic measure
HLD (hyperlipidemia)
Need for prophylactic measure

## 2018-05-01 ENCOUNTER — OUTPATIENT (OUTPATIENT)
Dept: OUTPATIENT SERVICES | Facility: HOSPITAL | Age: 38
LOS: 1 days | End: 2018-05-01

## 2018-05-01 DIAGNOSIS — Z98.891 HISTORY OF UTERINE SCAR FROM PREVIOUS SURGERY: Chronic | ICD-10-CM

## 2018-05-01 DIAGNOSIS — Z95.2 PRESENCE OF PROSTHETIC HEART VALVE: Chronic | ICD-10-CM

## 2018-05-29 ENCOUNTER — TRANSCRIPTION ENCOUNTER (OUTPATIENT)
Age: 38
End: 2018-05-29

## 2018-05-29 ENCOUNTER — EMERGENCY (EMERGENCY)
Facility: HOSPITAL | Age: 38
LOS: 1 days | Discharge: HOME | End: 2018-05-29
Attending: EMERGENCY MEDICINE | Admitting: INTERNAL MEDICINE
Payer: MEDICAID

## 2018-05-29 VITALS
HEART RATE: 69 BPM | TEMPERATURE: 98 F | SYSTOLIC BLOOD PRESSURE: 137 MMHG | OXYGEN SATURATION: 99 % | RESPIRATION RATE: 18 BRPM | DIASTOLIC BLOOD PRESSURE: 79 MMHG

## 2018-05-29 VITALS
DIASTOLIC BLOOD PRESSURE: 55 MMHG | OXYGEN SATURATION: 100 % | RESPIRATION RATE: 18 BRPM | TEMPERATURE: 98 F | HEART RATE: 71 BPM | SYSTOLIC BLOOD PRESSURE: 103 MMHG

## 2018-05-29 DIAGNOSIS — Z95.2 PRESENCE OF PROSTHETIC HEART VALVE: Chronic | ICD-10-CM

## 2018-05-29 DIAGNOSIS — Z98.891 HISTORY OF UTERINE SCAR FROM PREVIOUS SURGERY: Chronic | ICD-10-CM

## 2018-05-29 DIAGNOSIS — R07.9 CHEST PAIN, UNSPECIFIED: ICD-10-CM

## 2018-05-29 LAB
ALBUMIN SERPL ELPH-MCNC: 4.2 G/DL — SIGNIFICANT CHANGE UP (ref 3.3–5)
ALP SERPL-CCNC: 60 U/L — SIGNIFICANT CHANGE UP (ref 40–120)
ALT FLD-CCNC: 29 U/L — SIGNIFICANT CHANGE UP (ref 4–33)
APTT BLD: 57.5 SEC — HIGH (ref 27.5–37.4)
AST SERPL-CCNC: 27 U/L — SIGNIFICANT CHANGE UP (ref 4–32)
BASOPHILS # BLD AUTO: 0.01 K/UL — SIGNIFICANT CHANGE UP (ref 0–0.2)
BASOPHILS NFR BLD AUTO: 0.2 % — SIGNIFICANT CHANGE UP (ref 0–2)
BILIRUB SERPL-MCNC: 0.4 MG/DL — SIGNIFICANT CHANGE UP (ref 0.2–1.2)
BUN SERPL-MCNC: 13 MG/DL — SIGNIFICANT CHANGE UP (ref 7–23)
CALCIUM SERPL-MCNC: 9.4 MG/DL — SIGNIFICANT CHANGE UP (ref 8.4–10.5)
CHLORIDE SERPL-SCNC: 100 MMOL/L — SIGNIFICANT CHANGE UP (ref 98–107)
CO2 SERPL-SCNC: 21 MMOL/L — LOW (ref 22–31)
CREAT SERPL-MCNC: 0.53 MG/DL — SIGNIFICANT CHANGE UP (ref 0.5–1.3)
EOSINOPHIL # BLD AUTO: 0.09 K/UL — SIGNIFICANT CHANGE UP (ref 0–0.5)
EOSINOPHIL NFR BLD AUTO: 1.7 % — SIGNIFICANT CHANGE UP (ref 0–6)
GLUCOSE SERPL-MCNC: 111 MG/DL — HIGH (ref 70–99)
HCG SERPL-ACNC: < 5 MIU/ML — SIGNIFICANT CHANGE UP
HCT VFR BLD CALC: 39.9 % — SIGNIFICANT CHANGE UP (ref 34.5–45)
HGB BLD-MCNC: 12.9 G/DL — SIGNIFICANT CHANGE UP (ref 11.5–15.5)
IMM GRANULOCYTES # BLD AUTO: 0.01 # — SIGNIFICANT CHANGE UP
IMM GRANULOCYTES NFR BLD AUTO: 0.2 % — SIGNIFICANT CHANGE UP (ref 0–1.5)
INR BLD: 3.48 — HIGH (ref 0.88–1.17)
LYMPHOCYTES # BLD AUTO: 2.36 K/UL — SIGNIFICANT CHANGE UP (ref 1–3.3)
LYMPHOCYTES # BLD AUTO: 45.4 % — HIGH (ref 13–44)
MCHC RBC-ENTMCNC: 26.1 PG — LOW (ref 27–34)
MCHC RBC-ENTMCNC: 32.3 % — SIGNIFICANT CHANGE UP (ref 32–36)
MCV RBC AUTO: 80.6 FL — SIGNIFICANT CHANGE UP (ref 80–100)
MONOCYTES # BLD AUTO: 0.28 K/UL — SIGNIFICANT CHANGE UP (ref 0–0.9)
MONOCYTES NFR BLD AUTO: 5.4 % — SIGNIFICANT CHANGE UP (ref 2–14)
NEUTROPHILS # BLD AUTO: 2.45 K/UL — SIGNIFICANT CHANGE UP (ref 1.8–7.4)
NEUTROPHILS NFR BLD AUTO: 47.1 % — SIGNIFICANT CHANGE UP (ref 43–77)
NRBC # FLD: 0 — SIGNIFICANT CHANGE UP
PLATELET # BLD AUTO: 263 K/UL — SIGNIFICANT CHANGE UP (ref 150–400)
PMV BLD: 10.3 FL — SIGNIFICANT CHANGE UP (ref 7–13)
POTASSIUM SERPL-MCNC: 4.1 MMOL/L — SIGNIFICANT CHANGE UP (ref 3.5–5.3)
POTASSIUM SERPL-SCNC: 4.1 MMOL/L — SIGNIFICANT CHANGE UP (ref 3.5–5.3)
PROT SERPL-MCNC: 7.2 G/DL — SIGNIFICANT CHANGE UP (ref 6–8.3)
PROTHROM AB SERPL-ACNC: 41.1 SEC — HIGH (ref 9.8–13.1)
RBC # BLD: 4.95 M/UL — SIGNIFICANT CHANGE UP (ref 3.8–5.2)
RBC # FLD: 13.5 % — SIGNIFICANT CHANGE UP (ref 10.3–14.5)
SODIUM SERPL-SCNC: 137 MMOL/L — SIGNIFICANT CHANGE UP (ref 135–145)
TROPONIN T SERPL-MCNC: < 0.06 NG/ML — SIGNIFICANT CHANGE UP (ref 0–0.06)
WBC # BLD: 5.2 K/UL — SIGNIFICANT CHANGE UP (ref 3.8–10.5)
WBC # FLD AUTO: 5.2 K/UL — SIGNIFICANT CHANGE UP (ref 3.8–10.5)

## 2018-05-29 PROCEDURE — 99285 EMERGENCY DEPT VISIT HI MDM: CPT

## 2018-05-29 RX ORDER — LIDOCAINE 4 G/100G
1 CREAM TOPICAL
Qty: 0 | Refills: 0 | COMMUNITY

## 2018-05-29 RX ORDER — ERGOCALCIFEROL 1.25 MG/1
1 CAPSULE ORAL
Qty: 0 | Refills: 0 | COMMUNITY

## 2018-05-29 NOTE — ED PROVIDER NOTE - PROGRESS NOTE DETAILS
PAT Bowers, to admit pt to Dr Walker/tele, plan for echo as per dr bowers.  Labs, Cxr, xray shoulder pending at current time. x-rays ok, trop ng. As dw atttending arsen will admit to cardio Dr Walker/ text paged tele PA

## 2018-05-29 NOTE — ED PROVIDER NOTE - OBJECTIVE STATEMENT
36 yo female, pmh arthritis, high cholesterol, htn, mvp sp valve replacement 1 year ago at [facility].  presents to the ED co Lt sided chest pain w/ retaliation to left shoulder which started yesterday afternoon/evening. Pain has been persistent but varies in intensity and is exacerbated with movement.  Denies any sob, n/v , diaphoresis, or any other complaints.    Last visit with Dr Bowers was about 3-4 weeks ago, echo done at the time which was okay as per pt

## 2018-05-29 NOTE — ED PROVIDER NOTE - ATTENDING CONTRIBUTION TO CARE
I performed a face to face evaluation of this patient and obtained a history and performed a full exam.  I agree with the history, physical exam and plan of the PA.  Pt with lue, cp h/o MVR on Coumadin, lungs cta, heart s1,s2 rrr abd soft nontender, neuro wnl, occurred after lifting furniture, likely msk, but with cardiac history.  Will call Dr. Mark Bowers, labs, monitor, cxr

## 2018-05-29 NOTE — DISCHARGE NOTE ADULT - PATIENT PORTAL LINK FT
You can access the [facility] Patient Portal, offered by [facility], by registering with the following website: [email or web address]

## 2018-05-29 NOTE — DISCHARGE NOTE ADULT - CARE PLAN
Principal Discharge DX:	Musculoskeletal chest pain  Goal:	to be symptom free  Assessment and plan of treatment:	Please follow up with your Private MD Dr NATE Garnett and Cardiologist Dr RAMAN Bowers in 1 week for further management

## 2018-05-29 NOTE — DISCHARGE NOTE ADULT - PLAN OF CARE
to be symptom free Please follow up with your Private MD Dr NATE Garnett and Cardiologist Dr RAMAN Bowers in 1 week for further management

## 2018-05-29 NOTE — ED PROVIDER NOTE - PMH
HLD (hyperlipidemia)    HTN (hypertension)    MR (mitral regurgitation)  S/P mechanical MVR on 6/7/17 at [facility]  MVP (mitral valve prolapse)    Osteomyelitis of arm  Right arm osteomyelitis, treated in [facility] in 1998

## 2018-05-29 NOTE — ED ADULT NURSE NOTE - OBJECTIVE STATEMENT
Pt to bed 7. Alert and oriented x 3. Pt c/o left arm pain radiating into chest and neck and back of head. IVL placed. Bloods drawn. Will continue to monitor.

## 2018-05-29 NOTE — DISCHARGE NOTE ADULT - MEDICATION SUMMARY - MEDICATIONS TO TAKE
I will START or STAY ON the medications listed below when I get home from the hospital:    Coumadin 10 mg oral tablet  -- 1 tab(s) by mouth once a day  -- Indication: For Valve replacement    gabapentin 100 mg oral capsule  -- 1 to 2 capsules by mouth daily   -- Indication: For pain    atorvastatin 40 mg oral tablet  -- 1 tab(s) by mouth once a day  -- Indication: For Cholesterol    Lasix 20 mg oral tablet  -- 1 tab(s) by mouth once a day, As Needed for lower extremity swelling  -- Indication: For swelling    docusate sodium 100 mg oral capsule  -- 1 cap(s) by mouth 2 times a day, As Needed  -- Indication: For Constipation    senna oral tablet  -- 2 tab(s) by mouth once a day, As Needed  -- Indication: For Constipation    polyethylene glycol 3350 oral powder for reconstitution  -- 17 gram(s) by mouth once a day, As Needed  -- Indication: For Constipation    Fish Oil oral capsule  -- orally once a day  -- Indication: For supplement    pantoprazole 40 mg oral delayed release tablet  -- 1 tab(s) by mouth once a day  -- Indication: For acid reflux    Calcium 500+D oral tablet, chewable  -- 1 tab(s) by mouth once a day  -- Indication: For supplement    folic acid 1 mg oral tablet  -- 1 tab(s) by mouth once a day  -- Indication: For supplement

## 2018-05-29 NOTE — DISCHARGE NOTE ADULT - CARE PROVIDER_API CALL
Mark Bowers), Cardiovascular Disease; Internal Medicine  [address] [place] [address]  Phone: [phone]  Fax: [phone]

## 2018-05-29 NOTE — ED ADULT TRIAGE NOTE - CHIEF COMPLAINT QUOTE
states " I have heaviness to my left arm started since yesterday and has chest pain on and off since yesterday and pain to neck and left shoulder started since Sunday after moving some furniture. ". h/o mitral valve prolapse and is on coumadin.

## 2018-05-29 NOTE — ED PROVIDER NOTE - MEDICAL DECISION MAKING DETAILS
C pain w/ hx valve replacment 1 year ago dr bowers: Tele, EKG, Lab work, cxr, shoulder xray, admit to dr Walker , cardiology as dw Dr Bowers. C pain w/ hx valve replacment 1 year ago dr bowers: Tele, EKG, Lab work, cxr, shoulder xray, admit to dr Walker after results back  ,( cardiology) as dw Dr Bowers.

## 2018-05-29 NOTE — DISCHARGE NOTE ADULT - HOSPITAL COURSE
38 yo F p/w Left arm pain/ Atypical CP. Pt was to be admitted to Telemetry but evaluated by Cardiologist and cleared for d/c. Chest pain- Likely musculoskeletal in nature, as patient was recently moving heavy furniture. EKG is sinus without ischemic changes. Troponin negative. Cath in 2016 with normal coronaries. No indication for further ischemic work up. 38 yo F p/w Left arm pain/ Atypical CP. Pt was to be admitted to Telemetry but evaluated by Cardiologist and cleared for d/c. Chest pain- Likely musculoskeletal in nature, as patient was recently moving heavy furniture. EKG is sinus without ischemic changes. Troponin negative. Cath in 2016 with normal coronaries. No indication for further ischemic work up.   Patient will f/u with her outpatient cardiologist, Dr. Bowers.

## 2018-05-29 NOTE — CONSULT NOTE ADULT - SUBJECTIVE AND OBJECTIVE BOX
CHIEF COMPLAINT: Chest pain    HISTORY OF PRESENT ILLNESS:  This is a 37 year old woman with severe mitral regurgitation s/p mechanical MVR and [facility] in 2016 who presented to [facility] ED today with chest, neck and L arm pain. Ms. Dyer says that she was moving heavy furniture two days prior to presentation and then developed this pain yesterday. Currently she says the pain is better and is worse with movement. She had an echo that was reportedly unremarkable in Dr. Bowers's office last month.     Allergies  heparin containing compounds (Other)    MEDICATIONS:    Reviewed      PAST MEDICAL & SURGICAL HISTORY:  MR (mitral regurgitation): S/P mechanical MVR on 17 at [facility]  HLD (hyperlipidemia)  HTN (hypertension)  Osteomyelitis of arm: Right arm osteomyelitis, treated in [facility] in   MVP (mitral valve prolapse)  S/P MVR (mitral valve replacement): Mechanical MVR at [facility] on 17  S/P       FAMILY HISTORY:  Family history of valvular heart disease (Sibling)  Family history of early CAD      SOCIAL HISTORY:    Non-smoker        REVIEW OF SYSTEMS:  See HPI, otherwise complete 10 point review of systems negative    PHYSICAL EXAM:  T(C): 36.6 (18 @ 12:21), Max: 37.1 (18 @ 10:23)  HR: 71 (18 @ 12:21) (69 - 75)  BP: 103/55 (18 @ 12:21) (103/55 - 137/79)  RR: 18 (18 @ 12:21) (18 - 18)  SpO2: 100% (18 @ 12:21) (99% - 100%)  Wt(kg): --  I&O's Summary      Appearance: No Acute Distress	  HEENT:  Normal oral mucosa, PERRL, EOMI	  Cardiovascular: Mechanical S1, No JVD, No murmurs/rubs/gallops  Respiratory: Lungs clear to auscultation bilaterally  Gastrointestinal:  Soft, Non-tender, + BS	  Skin: No rashes, No ecchymoses, No cyanosis	  Neurologic: Non-focal  Extremities: No clubbing, cyanosis or edema  Vascular: Peripheral pulses palpable 2+ bilaterally  Psychiatry: A & O x 3, Mood & affect appropriate    Laboratory Data:	 	    CBC Full  -  ( 29 May 2018 10:00 )  WBC Count : 5.20 K/uL  Hemoglobin : 12.9 g/dL  Hematocrit : 39.9 %  Platelet Count - Automated : 263 K/uL  Mean Cell Volume : 80.6 fL  Mean Cell Hemoglobin : 26.1 pg  Mean Cell Hemoglobin Concentration : 32.3 %  Auto Neutrophil # : 2.45 K/uL  Auto Lymphocyte # : 2.36 K/uL  Auto Monocyte # : 0.28 K/uL  Auto Eosinophil # : 0.09 K/uL  Auto Basophil # : 0.01 K/uL  Auto Neutrophil % : 47.1 %  Auto Lymphocyte % : 45.4 %  Auto Monocyte % : 5.4 %  Auto Eosinophil % : 1.7 %  Auto Basophil % : 0.2 %        137  |  100  |  13  ----------------------------<  111<H>  4.1   |  21<L>  |  0.53    Ca    9.4      29 May 2018 10:00    TPro  7.2  /  Alb  4.2  /  TBili  0.4  /  DBili  x   /  AST  27  /  ALT  29  /  AlkPhos  60          Interpretation of Telemetry:   Sinus	    ECG:  Sinus; Left atrial enlargement; no ischemic changes    Assessment: 37 year old woman with MR s/p mechanical MVR, HIT, HTN and HLD presents with chest pain.    Plan of Care:    #Chest pain-  Likely musculoskeletal in nature, as patient was recently moving heavy furniture.  EKG is sinus without ischemic changes.   Troponin negative.  Cath in 2016 with normal coronaries.  No indication for further ischemic work up.     #Mitral regurgitation s/p mechanical MVR-  INR therapeutic.  Patient had an echo in Dr. Bowers's office last month revealing normal valve functioning.  Continue home dose warfarin.    No further inpatient cardiac work up.  Discharge planning.     Discussed with patient, , and tele PA.     90 minutes spent on total encounter; more than 50% of the visit was spent counseling and/or coordinating care by the attending physician.   	  Brandon Walker MD [facility]  Cardiovascular Diseases  [phone]

## 2018-05-30 DIAGNOSIS — R69 ILLNESS, UNSPECIFIED: ICD-10-CM

## 2019-01-20 ENCOUNTER — TRANSCRIPTION ENCOUNTER (OUTPATIENT)
Age: 39
End: 2019-01-20

## 2019-01-21 ENCOUNTER — RESULT REVIEW (OUTPATIENT)
Age: 39
End: 2019-01-21

## 2019-01-21 ENCOUNTER — INPATIENT (INPATIENT)
Facility: HOSPITAL | Age: 39
LOS: 0 days | Discharge: HOME | End: 2019-01-22
Attending: SURGERY | Admitting: SURGERY
Payer: COMMERCIAL

## 2019-01-21 VITALS
RESPIRATION RATE: 17 BRPM | OXYGEN SATURATION: 96 % | HEART RATE: 86 BPM | SYSTOLIC BLOOD PRESSURE: 116 MMHG | TEMPERATURE: 98 F | DIASTOLIC BLOOD PRESSURE: 66 MMHG

## 2019-01-21 DIAGNOSIS — Z95.2 PRESENCE OF PROSTHETIC HEART VALVE: Chronic | ICD-10-CM

## 2019-01-21 DIAGNOSIS — Z98.891 HISTORY OF UTERINE SCAR FROM PREVIOUS SURGERY: Chronic | ICD-10-CM

## 2019-01-21 DIAGNOSIS — K37 UNSPECIFIED APPENDICITIS: ICD-10-CM

## 2019-01-21 PROBLEM — E78.5 HYPERLIPIDEMIA, UNSPECIFIED: Chronic | Status: ACTIVE | Noted: 2017-06-19

## 2019-01-21 PROBLEM — I34.0 NONRHEUMATIC MITRAL (VALVE) INSUFFICIENCY: Chronic | Status: ACTIVE | Noted: 2017-06-19

## 2019-01-21 PROBLEM — I10 ESSENTIAL (PRIMARY) HYPERTENSION: Chronic | Status: ACTIVE | Noted: 2017-06-19

## 2019-01-21 LAB
ALBUMIN SERPL ELPH-MCNC: 4.5 G/DL — SIGNIFICANT CHANGE UP (ref 3.3–5)
ALP SERPL-CCNC: 66 U/L — SIGNIFICANT CHANGE UP (ref 40–120)
ALT FLD-CCNC: 22 U/L — SIGNIFICANT CHANGE UP (ref 4–33)
ANION GAP SERPL CALC-SCNC: 13 MMO/L — SIGNIFICANT CHANGE UP (ref 7–14)
APPEARANCE UR: CLEAR — SIGNIFICANT CHANGE UP
APTT BLD: 41 SEC — HIGH (ref 27.5–36.3)
AST SERPL-CCNC: 18 U/L — SIGNIFICANT CHANGE UP (ref 4–32)
BASE EXCESS BLDV CALC-SCNC: 0.4 MMOL/L — SIGNIFICANT CHANGE UP
BASOPHILS # BLD AUTO: 0.01 K/UL — SIGNIFICANT CHANGE UP (ref 0–0.2)
BASOPHILS NFR BLD AUTO: 0.1 % — SIGNIFICANT CHANGE UP (ref 0–2)
BILIRUB SERPL-MCNC: 0.9 MG/DL — SIGNIFICANT CHANGE UP (ref 0.2–1.2)
BILIRUB UR-MCNC: NEGATIVE — SIGNIFICANT CHANGE UP
BLD GP AB SCN SERPL QL: NEGATIVE — SIGNIFICANT CHANGE UP
BLOOD GAS VENOUS - CREATININE: 0.51 MG/DL — SIGNIFICANT CHANGE UP (ref 0.5–1.3)
BLOOD UR QL VISUAL: NEGATIVE — SIGNIFICANT CHANGE UP
BUN SERPL-MCNC: 10 MG/DL — SIGNIFICANT CHANGE UP (ref 7–23)
CALCIUM SERPL-MCNC: 9.3 MG/DL — SIGNIFICANT CHANGE UP (ref 8.4–10.5)
CHLORIDE BLDV-SCNC: 102 MMOL/L — SIGNIFICANT CHANGE UP (ref 96–108)
CHLORIDE SERPL-SCNC: 100 MMOL/L — SIGNIFICANT CHANGE UP (ref 98–107)
CO2 SERPL-SCNC: 23 MMOL/L — SIGNIFICANT CHANGE UP (ref 22–31)
COLOR SPEC: COLORLESS — SIGNIFICANT CHANGE UP
CREAT SERPL-MCNC: 0.64 MG/DL — SIGNIFICANT CHANGE UP (ref 0.5–1.3)
EOSINOPHIL # BLD AUTO: 0.05 K/UL — SIGNIFICANT CHANGE UP (ref 0–0.5)
EOSINOPHIL NFR BLD AUTO: 0.4 % — SIGNIFICANT CHANGE UP (ref 0–6)
GAS PNL BLDV: 134 MMOL/L — LOW (ref 136–146)
GLUCOSE BLDV-MCNC: 101 — HIGH (ref 70–99)
GLUCOSE SERPL-MCNC: 98 MG/DL — SIGNIFICANT CHANGE UP (ref 70–99)
GLUCOSE UR-MCNC: NEGATIVE — SIGNIFICANT CHANGE UP
HCO3 BLDV-SCNC: 23 MMOL/L — SIGNIFICANT CHANGE UP (ref 20–27)
HCT VFR BLD CALC: 39 % — SIGNIFICANT CHANGE UP (ref 34.5–45)
HCT VFR BLDV CALC: 38.8 % — SIGNIFICANT CHANGE UP (ref 34.5–45)
HGB BLD-MCNC: 12.4 G/DL — SIGNIFICANT CHANGE UP (ref 11.5–15.5)
HGB BLDV-MCNC: 12.6 G/DL — SIGNIFICANT CHANGE UP (ref 11.5–15.5)
IMM GRANULOCYTES NFR BLD AUTO: 0.3 % — SIGNIFICANT CHANGE UP (ref 0–1.5)
INR BLD: 2.11 — HIGH (ref 0.88–1.17)
KETONES UR-MCNC: NEGATIVE — SIGNIFICANT CHANGE UP
LACTATE BLDV-MCNC: 1.1 MMOL/L — SIGNIFICANT CHANGE UP (ref 0.5–2)
LEUKOCYTE ESTERASE UR-ACNC: NEGATIVE — SIGNIFICANT CHANGE UP
LYMPHOCYTES # BLD AUTO: 23.6 % — SIGNIFICANT CHANGE UP (ref 13–44)
LYMPHOCYTES # BLD AUTO: 3.16 K/UL — SIGNIFICANT CHANGE UP (ref 1–3.3)
MCHC RBC-ENTMCNC: 25.5 PG — LOW (ref 27–34)
MCHC RBC-ENTMCNC: 31.8 % — LOW (ref 32–36)
MCV RBC AUTO: 80.2 FL — SIGNIFICANT CHANGE UP (ref 80–100)
MONOCYTES # BLD AUTO: 0.4 K/UL — SIGNIFICANT CHANGE UP (ref 0–0.9)
MONOCYTES NFR BLD AUTO: 3 % — SIGNIFICANT CHANGE UP (ref 2–14)
NEUTROPHILS # BLD AUTO: 9.73 K/UL — HIGH (ref 1.8–7.4)
NEUTROPHILS NFR BLD AUTO: 72.6 % — SIGNIFICANT CHANGE UP (ref 43–77)
NITRITE UR-MCNC: NEGATIVE — SIGNIFICANT CHANGE UP
NRBC # FLD: 0 K/UL — LOW (ref 25–125)
PCO2 BLDV: 45 MMHG — SIGNIFICANT CHANGE UP (ref 41–51)
PH BLDV: 7.36 PH — SIGNIFICANT CHANGE UP (ref 7.32–7.43)
PH UR: 6.5 — SIGNIFICANT CHANGE UP (ref 5–8)
PLATELET # BLD AUTO: 253 K/UL — SIGNIFICANT CHANGE UP (ref 150–400)
PMV BLD: 9.5 FL — SIGNIFICANT CHANGE UP (ref 7–13)
PO2 BLDV: 19 MMHG — LOW (ref 35–40)
POTASSIUM BLDV-SCNC: 3.5 MMOL/L — SIGNIFICANT CHANGE UP (ref 3.4–4.5)
POTASSIUM SERPL-MCNC: 3.8 MMOL/L — SIGNIFICANT CHANGE UP (ref 3.5–5.3)
POTASSIUM SERPL-SCNC: 3.8 MMOL/L — SIGNIFICANT CHANGE UP (ref 3.5–5.3)
PROT SERPL-MCNC: 7.8 G/DL — SIGNIFICANT CHANGE UP (ref 6–8.3)
PROT UR-MCNC: NEGATIVE — SIGNIFICANT CHANGE UP
PROTHROM AB SERPL-ACNC: 24 SEC — HIGH (ref 9.8–13.1)
RBC # BLD: 4.86 M/UL — SIGNIFICANT CHANGE UP (ref 3.8–5.2)
RBC # FLD: 13.5 % — SIGNIFICANT CHANGE UP (ref 10.3–14.5)
RH IG SCN BLD-IMP: POSITIVE — SIGNIFICANT CHANGE UP
RH IG SCN BLD-IMP: POSITIVE — SIGNIFICANT CHANGE UP
SAO2 % BLDV: 24.1 % — LOW (ref 60–85)
SODIUM SERPL-SCNC: 136 MMOL/L — SIGNIFICANT CHANGE UP (ref 135–145)
SP GR SPEC: 1.01 — SIGNIFICANT CHANGE UP (ref 1–1.04)
UROBILINOGEN FLD QL: NORMAL — SIGNIFICANT CHANGE UP
WBC # BLD: 13.39 K/UL — HIGH (ref 3.8–10.5)
WBC # FLD AUTO: 13.39 K/UL — HIGH (ref 3.8–10.5)

## 2019-01-21 PROCEDURE — 99222 1ST HOSP IP/OBS MODERATE 55: CPT | Mod: 57

## 2019-01-21 PROCEDURE — 74177 CT ABD & PELVIS W/CONTRAST: CPT | Mod: 26

## 2019-01-21 PROCEDURE — 44970 LAPAROSCOPY APPENDECTOMY: CPT

## 2019-01-21 PROCEDURE — 88304 TISSUE EXAM BY PATHOLOGIST: CPT | Mod: 26

## 2019-01-21 PROCEDURE — 88312 SPECIAL STAINS GROUP 1: CPT | Mod: 26

## 2019-01-21 RX ORDER — SODIUM CHLORIDE 9 MG/ML
500 INJECTION INTRAMUSCULAR; INTRAVENOUS; SUBCUTANEOUS ONCE
Qty: 0 | Refills: 0 | Status: COMPLETED | OUTPATIENT
Start: 2019-01-21 | End: 2019-01-21

## 2019-01-21 RX ORDER — SODIUM CHLORIDE 9 MG/ML
1000 INJECTION, SOLUTION INTRAVENOUS
Qty: 0 | Refills: 0 | Status: DISCONTINUED | OUTPATIENT
Start: 2019-01-21 | End: 2019-01-22

## 2019-01-21 RX ORDER — ACETAMINOPHEN 500 MG
1000 TABLET ORAL ONCE
Qty: 0 | Refills: 0 | Status: COMPLETED | OUTPATIENT
Start: 2019-01-21 | End: 2019-01-21

## 2019-01-21 RX ORDER — WARFARIN SODIUM 2.5 MG/1
1 TABLET ORAL
Qty: 0 | Refills: 0 | COMMUNITY

## 2019-01-21 RX ORDER — OXYCODONE HYDROCHLORIDE 5 MG/1
5 TABLET ORAL ONCE
Qty: 0 | Refills: 0 | Status: DISCONTINUED | OUTPATIENT
Start: 2019-01-21 | End: 2019-01-22

## 2019-01-21 RX ORDER — PANTOPRAZOLE SODIUM 20 MG/1
1 TABLET, DELAYED RELEASE ORAL
Qty: 0 | Refills: 0 | COMMUNITY

## 2019-01-21 RX ORDER — MORPHINE SULFATE 50 MG/1
4 CAPSULE, EXTENDED RELEASE ORAL ONCE
Qty: 0 | Refills: 0 | Status: DISCONTINUED | OUTPATIENT
Start: 2019-01-21 | End: 2019-01-21

## 2019-01-21 RX ORDER — FUROSEMIDE 40 MG
1 TABLET ORAL
Qty: 0 | Refills: 0 | COMMUNITY

## 2019-01-21 RX ORDER — PIPERACILLIN AND TAZOBACTAM 4; .5 G/20ML; G/20ML
3.38 INJECTION, POWDER, LYOPHILIZED, FOR SOLUTION INTRAVENOUS ONCE
Qty: 0 | Refills: 0 | Status: COMPLETED | OUTPATIENT
Start: 2019-01-21 | End: 2019-01-21

## 2019-01-21 RX ORDER — SENNA PLUS 8.6 MG/1
2 TABLET ORAL
Qty: 0 | Refills: 0 | COMMUNITY

## 2019-01-21 RX ORDER — GABAPENTIN 400 MG/1
1 CAPSULE ORAL
Qty: 0 | Refills: 0 | COMMUNITY

## 2019-01-21 RX ORDER — ONDANSETRON 8 MG/1
4 TABLET, FILM COATED ORAL ONCE
Qty: 0 | Refills: 0 | Status: DISCONTINUED | OUTPATIENT
Start: 2019-01-21 | End: 2019-01-22

## 2019-01-21 RX ORDER — FENTANYL CITRATE 50 UG/ML
50 INJECTION INTRAVENOUS
Qty: 0 | Refills: 0 | Status: DISCONTINUED | OUTPATIENT
Start: 2019-01-21 | End: 2019-01-22

## 2019-01-21 RX ORDER — DOCUSATE SODIUM 100 MG
1 CAPSULE ORAL
Qty: 0 | Refills: 0 | COMMUNITY

## 2019-01-21 RX ADMIN — MORPHINE SULFATE 4 MILLIGRAM(S): 50 CAPSULE, EXTENDED RELEASE ORAL at 14:31

## 2019-01-21 RX ADMIN — PIPERACILLIN AND TAZOBACTAM 200 GRAM(S): 4; .5 INJECTION, POWDER, LYOPHILIZED, FOR SOLUTION INTRAVENOUS at 17:10

## 2019-01-21 RX ADMIN — Medication 400 MILLIGRAM(S): at 12:58

## 2019-01-21 RX ADMIN — SODIUM CHLORIDE 1500 MILLILITER(S): 9 INJECTION INTRAMUSCULAR; INTRAVENOUS; SUBCUTANEOUS at 12:52

## 2019-01-21 RX ADMIN — SODIUM CHLORIDE 75 MILLILITER(S): 9 INJECTION, SOLUTION INTRAVENOUS at 17:11

## 2019-01-21 RX ADMIN — Medication 1000 MILLIGRAM(S): at 17:10

## 2019-01-21 NOTE — ED PROVIDER NOTE - MEDICAL DECISION MAKING DETAILS
38yF w/pmhx HLD, MVP s/p valve replacement on coumadin p/w RLQ pain x yesterday. On exam +RLQ tenderness, +rebound +Rosvings. Pelvic exam non-tender, concern for appy. Will get CT to r/o appy, cbc/cmp/coags, ua and culture. Will provide analgesia and reassess.

## 2019-01-21 NOTE — ED ADULT NURSE REASSESSMENT NOTE - NS ED NURSE REASSESS COMMENT FT1
Patient awake and alert, c/o lower right sided abdominal pain. IV placed, labs sent, urine sent, awaiting ct scan. Will continue to monitor.

## 2019-01-21 NOTE — ED PROVIDER NOTE - PROGRESS NOTE DETAILS
NIKI Patel: Surgery paged for equivocal scan for appendicitis NIKI Patel: Spoke with surgery, pt to be admitted to , recommend dose of Zosyn. Pt denies abx allergies. Pt agrees with admission

## 2019-01-21 NOTE — ED ADULT NURSE NOTE - CHIEF COMPLAINT QUOTE
Co rlq abdominal pain that radiates to entire lower abdomen accompanied with nausea since yesterday. Sent by pcp this morning to r/o appendicitis. Pt coughing in triage [place], states she has bronchitis. Hx of mechanical valve.

## 2019-01-21 NOTE — ED ADULT NURSE NOTE - PMH
HLD (hyperlipidemia)    HTN (hypertension)    MR (mitral regurgitation)  S/P mechanical MVR on 6/7/17 at [facility]  MVP (mitral valve prolapse)    Osteomyelitis of arm  Right arm osteomyelitis, treated in [facility] in 1998

## 2019-01-21 NOTE — ED ADULT NURSE NOTE - PSH
S/P     S/P MVR (mitral valve replacement)  Mechanical MVR at [facility] on 17

## 2019-01-21 NOTE — H&P ADULT - FAMILY HISTORY
Family history of early CAD     Sibling  Still living? Yes, Estimated age: Age Unknown  Family history of valvular heart disease, Age at diagnosis: Age Unknown

## 2019-01-21 NOTE — H&P ADULT - NSHPPHYSICALEXAM_GEN_ALL_CORE
General: WN/WD NAD  Neurology: A&Ox3, nonfocal, MEDINA x 4  Head:  Normocephalic, atraumatic  ENT:  Mucosa moist, no ulcerations  Neck:  Supple, no sinuses or palpable masses  Lymphatic:  No palpable cervical, supraclavicular, axillary or inguinal adenopathy  Respiratory: CTA B/L  CV: RRR, S1S2, no murmur  Abdominal: Soft, RLQ tenderness, +rovsing sign  MSK: No edema, + peripheral pulses, FROM all 4 extremity

## 2019-01-21 NOTE — ED PROVIDER NOTE - OBJECTIVE STATEMENT
38yF w/pmhx HLD, MVP s/p valve replacement on Warfarin presenting with RLQ pain x yesterday. Pt states last night at 10PM she suddenly developed RLQ pain. Pt states she had associated increase in belching. Pt states she saw her PMD this morning who sent her to the ER to rule out appendicitis. Pt states she also has dysuria. Pt states she has had bronchitis for the past 2 months. Pt denies nausea, vomiting, urinary frequency, diarrhea, cp, sob, recent travel or illness or any other concerns. 38yF w/pmhx HLD, MVP s/p valve replacement on Warfarin presenting with RLQ pain x yesterday. Pt states last night at 10PM she suddenly developed RLQ pain. Pt states she had associated increase in belching. Pt states she saw her PMD this morning who sent her to the ER to rule out appendicitis. Pt states she also has dysuria. Pt states she has had bronchitis for the past 2 months. Pt denies nausea, vomiting, urinary frequency, diarrhea, cp, sob, recent travel or illness or any other concerns.    Attendinyo female presents with RLQ pain since around 10pm last night.  pt states pain is sharp and worse with movement.  decreased PO intake.  Pt does have URI symptoms for about 1 month.  no vomiting.

## 2019-01-21 NOTE — H&P ADULT - PMH
HLD (hyperlipidemia)    HTN (hypertension)    MR (mitral regurgitation)  S/P mechanical MVR on 6/7/17 at [facility]  MVP (mitral valve prolapse)    Osteomyelitis of arm  Right arm osteomyelitis, treated in [facility] in 1998

## 2019-01-21 NOTE — H&P ADULT - ASSESSMENT
38F with history MVR s/p replacement 6/2017 on coumadin presents with appendicitis    -admit to B team surgery, Dr. Qiu  -NPO/IVF  -added on and consented for OR, laparoscopic appendectomy  -IV abx  -page [phone] with surgical questions

## 2019-01-21 NOTE — ED ADULT NURSE NOTE - NSIMPLEMENTINTERV_GEN_ALL_ED
Implemented All Universal Safety Interventions:  [place] to call system. Call bell, personal items and telephone within reach. Instruct patient to call for assistance. Room bathroom lighting operational. Non-slip footwear when patient is off stretcher. Physically safe environment: no spills, clutter or unnecessary equipment. Stretcher in lowest position, wheels locked, appropriate side rails in place.

## 2019-01-21 NOTE — ED ADULT TRIAGE NOTE - CHIEF COMPLAINT QUOTE
Co rlq abdominal pain that radiates to entire lower abdomen since yesterday. Sent by pcp this morning to r/o appendicitis. Pt coughing in triage [place], states she has bronchitis. Hx of mechanical valve. Co rlq abdominal pain that radiates to entire lower abdomen accompanied with nausea since yesterday. Sent by pcp this morning to r/o appendicitis. Pt coughing in triage [place], states she has bronchitis. Hx of mechanical valve.

## 2019-01-21 NOTE — H&P ADULT - HISTORY OF PRESENT ILLNESS
38F with history MVR s/p replacement 2017 on coumadin presents with one day of abdominal pain. Patient says the pain started suddenly last night and worsened throughout the day. No associated diarrhea, had a normal BM this am. Some associated nausea but no vomiting. Denies fevers or chills. Has never had this pain before.   In the ED, patient with normal vital signs. WBC 13.3. INR 2.11. Patient with significant RLQ tenderness, +rovsing sign. CT scan showing equivocal for for early appendicitis   Only surgery in the past MVR and  38F with history MVR s/p replacement 2017 on coumadin presents with one day of abdominal pain. Patient says the pain started suddenly last night and worsened throughout the day. No associated diarrhea, had a normal BM this am. Some associated nausea but no vomiting. Denies fevers or chills. Has never had this pain before.   In the ED, patient with normal vital signs. WBC 13.3. INR 2.11. Patient with significant RLQ tenderness, +rovsing sign. CT scan showing equivocal for for early appendicitis   Only surgery in the past MVR and   Last PO intake was water this am  Of note, patient does state that she has a heparin and lovenox allergy but does say that her reaction was not being able to achieve a high enough INR level for a month after being taken off her coumadin during last hospitalization. Denies any other sort of rash or reaction.

## 2019-01-21 NOTE — H&P ADULT - PSH
S/P     S/P MVR (mitral valve replacement)  Mechanical MVR at [facility] on 17

## 2019-01-21 NOTE — ED ADULT NURSE REASSESSMENT NOTE - SYMPTOMS
pt c.o. itching on palms and feel, no rash noted. no difficulty breathing or swallowing
abd soft, tender on palpation from mid abdomen to Rt lower quadrant, no rebound, no vomiting.
continues to c.o. abd pain, but doesn' want anything for pain at this time

## 2019-01-21 NOTE — ED ADULT NURSE REASSESSMENT NOTE - GENERAL PATIENT STATE
comfortable appearance/improvement verbalized/drowsy/family/SO at bedside/states pain is alittle better, states she feels sleepy after Instructions given not to drive or drink alcohol after taking percocet, verbalized understanding. Family here to drive patient home.. pt resting quietly

## 2019-01-21 NOTE — ED PROVIDER NOTE - PMH
HLD (hyperlipidemia)    HTN (hypertension)    MR (mitral regurgitation)  S/P mechanical MVR on 6/7/17 at [facility]  MVP (mitral valve prolapse)    Osteomyelitis of arm  Right arm osteomyelitis, treated in [facility] in 1998

## 2019-01-21 NOTE — H&P ADULT - NSHPLABSRESULTS_GEN_ALL_CORE
CBC Full  -  ( 2019 12:30 )  WBC Count : 13.39 K/uL  Hemoglobin : 12.4 g/dL  Hematocrit : 39.0 %  Platelet Count - Automated : 253 K/uL  Mean Cell Volume : 80.2 fL  Mean Cell Hemoglobin : 25.5 pg  Mean Cell Hemoglobin Concentration : 31.8 %  Auto Neutrophil # : 9.73 K/uL  Auto Lymphocyte # : 3.16 K/uL  Auto Monocyte # : 0.40 K/uL  Auto Eosinophil # : 0.05 K/uL  Auto Basophil # : 0.01 K/uL  Auto Neutrophil % : 72.6 %  Auto Lymphocyte % : 23.6 %  Auto Monocyte % : 3.0 %  Auto Eosinophil % : 0.4 %  Auto Basophil % : 0.1 %    21    136  |  100  |  10  ----------------------------<  98  3.8   |  23  |  0.64    Ca    9.3      2019 12:30    TPro  7.8  /  Alb  4.5  /  TBili  0.9  /  DBili  x   /  AST  18  /  ALT  22  /  AlkPhos  66  01-21    LIVER FUNCTIONS - ( 2019 12:30 )  Alb: 4.5 g/dL / Pro: 7.8 g/dL / ALK PHOS: 66 u/L / ALT: 22 u/L / AST: 18 u/L / GGT: x           CAPILLARY BLOOD GLUCOSE        Urinalysis Basic - ( 2019 12:38 )    Color: COLORLESS / Appearance: CLEAR / S.006 / pH: 6.5  Gluc: NEGATIVE / Ketone: NEGATIVE  / Bili: NEGATIVE / Urobili: NORMAL   Blood: NEGATIVE / Protein: NEGATIVE / Nitrite: NEGATIVE   Leuk Esterase: NEGATIVE / RBC: x / WBC x   Sq Epi: x / Non Sq Epi: x / Bacteria: x      PT/INR - ( 2019 12:30 )   PT: 24.0 SEC;   INR: 2.11          PTT - ( 2019 12:30 )  PTT:41.0 SEC    CT scan: IMPRESSION: Findings as described above which are equivocal for early   appendicitis. Clinical correlation is recommended and consider follow-up   CT with oral contrast and delay images as clinically indicated..    Hepatic steatosis.

## 2019-01-22 ENCOUNTER — TRANSCRIPTION ENCOUNTER (OUTPATIENT)
Age: 39
End: 2019-01-22

## 2019-01-22 VITALS
HEART RATE: 79 BPM | DIASTOLIC BLOOD PRESSURE: 62 MMHG | OXYGEN SATURATION: 97 % | RESPIRATION RATE: 18 BRPM | SYSTOLIC BLOOD PRESSURE: 108 MMHG | TEMPERATURE: 98 F

## 2019-01-22 LAB
ALBUMIN SERPL ELPH-MCNC: 4 G/DL — SIGNIFICANT CHANGE UP (ref 3.3–5)
ALP SERPL-CCNC: 62 U/L — SIGNIFICANT CHANGE UP (ref 40–120)
ALT FLD-CCNC: 21 U/L — SIGNIFICANT CHANGE UP (ref 4–33)
ANION GAP SERPL CALC-SCNC: 12 MMO/L — SIGNIFICANT CHANGE UP (ref 7–14)
APTT BLD: 40 SEC — HIGH (ref 27.5–36.3)
AST SERPL-CCNC: 18 U/L — SIGNIFICANT CHANGE UP (ref 4–32)
BASOPHILS # BLD AUTO: 0 K/UL — SIGNIFICANT CHANGE UP (ref 0–0.2)
BASOPHILS NFR BLD AUTO: 0 % — SIGNIFICANT CHANGE UP (ref 0–2)
BILIRUB SERPL-MCNC: 0.7 MG/DL — SIGNIFICANT CHANGE UP (ref 0.2–1.2)
BUN SERPL-MCNC: 8 MG/DL — SIGNIFICANT CHANGE UP (ref 7–23)
CALCIUM SERPL-MCNC: 8.7 MG/DL — SIGNIFICANT CHANGE UP (ref 8.4–10.5)
CHLORIDE SERPL-SCNC: 102 MMOL/L — SIGNIFICANT CHANGE UP (ref 98–107)
CO2 SERPL-SCNC: 22 MMOL/L — SIGNIFICANT CHANGE UP (ref 22–31)
CREAT SERPL-MCNC: 0.55 MG/DL — SIGNIFICANT CHANGE UP (ref 0.5–1.3)
EOSINOPHIL # BLD AUTO: 0 K/UL — SIGNIFICANT CHANGE UP (ref 0–0.5)
EOSINOPHIL NFR BLD AUTO: 0 % — SIGNIFICANT CHANGE UP (ref 0–6)
GLUCOSE SERPL-MCNC: 145 MG/DL — HIGH (ref 70–99)
HCT VFR BLD CALC: 37.2 % — SIGNIFICANT CHANGE UP (ref 34.5–45)
HGB BLD-MCNC: 11.9 G/DL — SIGNIFICANT CHANGE UP (ref 11.5–15.5)
IMM GRANULOCYTES NFR BLD AUTO: 0.3 % — SIGNIFICANT CHANGE UP (ref 0–1.5)
INR BLD: 2 — HIGH (ref 0.88–1.17)
LYMPHOCYTES # BLD AUTO: 0.69 K/UL — LOW (ref 1–3.3)
LYMPHOCYTES # BLD AUTO: 7.5 % — LOW (ref 13–44)
MCHC RBC-ENTMCNC: 25.9 PG — LOW (ref 27–34)
MCHC RBC-ENTMCNC: 32 % — SIGNIFICANT CHANGE UP (ref 32–36)
MCV RBC AUTO: 81 FL — SIGNIFICANT CHANGE UP (ref 80–100)
MONOCYTES # BLD AUTO: 0.04 K/UL — SIGNIFICANT CHANGE UP (ref 0–0.9)
MONOCYTES NFR BLD AUTO: 0.4 % — LOW (ref 2–14)
NEUTROPHILS # BLD AUTO: 8.5 K/UL — HIGH (ref 1.8–7.4)
NEUTROPHILS NFR BLD AUTO: 91.8 % — HIGH (ref 43–77)
NRBC # FLD: 0 K/UL — LOW (ref 25–125)
PLATELET # BLD AUTO: 247 K/UL — SIGNIFICANT CHANGE UP (ref 150–400)
PMV BLD: 9.7 FL — SIGNIFICANT CHANGE UP (ref 7–13)
POTASSIUM SERPL-MCNC: 4.3 MMOL/L — SIGNIFICANT CHANGE UP (ref 3.5–5.3)
POTASSIUM SERPL-SCNC: 4.3 MMOL/L — SIGNIFICANT CHANGE UP (ref 3.5–5.3)
PROT SERPL-MCNC: 7.3 G/DL — SIGNIFICANT CHANGE UP (ref 6–8.3)
PROTHROM AB SERPL-ACNC: 22.7 SEC — HIGH (ref 9.8–13.1)
RBC # BLD: 4.59 M/UL — SIGNIFICANT CHANGE UP (ref 3.8–5.2)
RBC # FLD: 13.8 % — SIGNIFICANT CHANGE UP (ref 10.3–14.5)
SODIUM SERPL-SCNC: 136 MMOL/L — SIGNIFICANT CHANGE UP (ref 135–145)
SPECIMEN SOURCE: SIGNIFICANT CHANGE UP
WBC # BLD: 9.26 K/UL — SIGNIFICANT CHANGE UP (ref 3.8–10.5)
WBC # FLD AUTO: 9.26 K/UL — SIGNIFICANT CHANGE UP (ref 3.8–10.5)

## 2019-01-22 RX ORDER — WARFARIN SODIUM 2.5 MG/1
1 TABLET ORAL
Qty: 2 | Refills: 0 | OUTPATIENT
Start: 2019-01-22 | End: 2019-01-22

## 2019-01-22 RX ORDER — ATORVASTATIN CALCIUM 80 MG/1
40 TABLET, FILM COATED ORAL AT BEDTIME
Qty: 0 | Refills: 0 | Status: DISCONTINUED | OUTPATIENT
Start: 2019-01-22 | End: 2019-01-22

## 2019-01-22 RX ORDER — FOLIC ACID 0.8 MG
1 TABLET ORAL DAILY
Qty: 0 | Refills: 0 | Status: DISCONTINUED | OUTPATIENT
Start: 2019-01-22 | End: 2019-01-22

## 2019-01-22 RX ORDER — WARFARIN SODIUM 2.5 MG/1
1 TABLET ORAL
Qty: 0 | Refills: 0 | COMMUNITY

## 2019-01-22 RX ORDER — PANTOPRAZOLE SODIUM 20 MG/1
40 TABLET, DELAYED RELEASE ORAL
Qty: 0 | Refills: 0 | Status: DISCONTINUED | OUTPATIENT
Start: 2019-01-22 | End: 2019-01-22

## 2019-01-22 RX ORDER — OXYCODONE HYDROCHLORIDE 5 MG/1
1 TABLET ORAL
Qty: 15 | Refills: 0 | OUTPATIENT
Start: 2019-01-22

## 2019-01-22 RX ORDER — OXYCODONE AND ACETAMINOPHEN 5; 325 MG/1; MG/1
2 TABLET ORAL EVERY 4 HOURS
Qty: 0 | Refills: 0 | Status: DISCONTINUED | OUTPATIENT
Start: 2019-01-22 | End: 2019-01-22

## 2019-01-22 RX ORDER — NITROFURANTOIN MACROCRYSTAL 50 MG
100 CAPSULE ORAL
Qty: 0 | Refills: 0 | Status: DISCONTINUED | OUTPATIENT
Start: 2019-01-22 | End: 2019-01-22

## 2019-01-22 RX ORDER — OXYCODONE AND ACETAMINOPHEN 5; 325 MG/1; MG/1
1 TABLET ORAL EVERY 4 HOURS
Qty: 0 | Refills: 0 | Status: DISCONTINUED | OUTPATIENT
Start: 2019-01-22 | End: 2019-01-22

## 2019-01-22 RX ORDER — WARFARIN SODIUM 2.5 MG/1
9 TABLET ORAL ONCE
Qty: 0 | Refills: 0 | Status: DISCONTINUED | OUTPATIENT
Start: 2019-01-22 | End: 2019-01-22

## 2019-01-22 RX ORDER — GABAPENTIN 400 MG/1
100 CAPSULE ORAL DAILY
Qty: 0 | Refills: 0 | Status: DISCONTINUED | OUTPATIENT
Start: 2019-01-22 | End: 2019-01-22

## 2019-01-22 RX ORDER — NITROFURANTOIN MACROCRYSTAL 50 MG
1 CAPSULE ORAL
Qty: 10 | Refills: 0 | OUTPATIENT
Start: 2019-01-22 | End: 2019-01-26

## 2019-01-22 RX ORDER — WARFARIN SODIUM 2.5 MG/1
10 TABLET ORAL ONCE
Qty: 0 | Refills: 0 | Status: DISCONTINUED | OUTPATIENT
Start: 2019-01-22 | End: 2019-01-22

## 2019-01-22 RX ORDER — FONDAPARINUX SODIUM 2.5 MG/.5ML
7.5 INJECTION, SOLUTION SUBCUTANEOUS DAILY
Qty: 0 | Refills: 0 | Status: DISCONTINUED | OUTPATIENT
Start: 2019-01-22 | End: 2019-01-22

## 2019-01-22 RX ORDER — WARFARIN SODIUM 2.5 MG/1
1 TABLET ORAL
Qty: 1 | Refills: 0 | OUTPATIENT
Start: 2019-01-22 | End: 2019-01-22

## 2019-01-22 RX ORDER — WARFARIN SODIUM 2.5 MG/1
8.5 TABLET ORAL ONCE
Qty: 0 | Refills: 0 | Status: DISCONTINUED | OUTPATIENT
Start: 2019-01-22 | End: 2019-01-22

## 2019-01-22 RX ORDER — OXYCODONE HYDROCHLORIDE 5 MG/1
1 TABLET ORAL
Qty: 20 | Refills: 0 | OUTPATIENT
Start: 2019-01-22

## 2019-01-22 RX ORDER — ALBUTEROL 90 UG/1
2 AEROSOL, METERED ORAL ONCE
Qty: 0 | Refills: 0 | Status: COMPLETED | OUTPATIENT
Start: 2019-01-22 | End: 2019-01-22

## 2019-01-22 RX ADMIN — Medication 1 MILLIGRAM(S): at 11:42

## 2019-01-22 RX ADMIN — Medication 100 MILLIGRAM(S): at 14:47

## 2019-01-22 RX ADMIN — FONDAPARINUX SODIUM 7.5 MILLIGRAM(S): 2.5 INJECTION, SOLUTION SUBCUTANEOUS at 12:39

## 2019-01-22 RX ADMIN — OXYCODONE AND ACETAMINOPHEN 2 TABLET(S): 5; 325 TABLET ORAL at 05:33

## 2019-01-22 RX ADMIN — ALBUTEROL 2 PUFF(S): 90 AEROSOL, METERED ORAL at 13:28

## 2019-01-22 RX ADMIN — GABAPENTIN 100 MILLIGRAM(S): 400 CAPSULE ORAL at 11:42

## 2019-01-22 RX ADMIN — OXYCODONE AND ACETAMINOPHEN 2 TABLET(S): 5; 325 TABLET ORAL at 06:05

## 2019-01-22 NOTE — DISCHARGE NOTE ADULT - ADDITIONAL INSTRUCTIONS
WOUND CARE: Please keep incisions clean and dry. Please do not scrub or rub incisions. Please to do not apply lotion or powder on incisions.   BATHING: Please do not submerge wound underwater. You may shower and/or sponge bathe.  ACTIVITY: No heavy lifting or straining. Otherwise, you may return to your usual level of physical activity. If you are taking narcotic pain medication (such as Percocet), do NOT drive a car, operate machinery or make important decisions.  DIET: Return to your usual diet.  NOTIFY YOUR SURGEON IF: You have any bleeding that does not stop, any pus draining from your wound, any fever (over 100.4 F) or chills, persistent nausea/vomiting, persistent diarrhea, or if your pain is not controlled on your discharge pain medications.    FOLLOW-UP:  1. Please call to make a follow-up appointment with Dr. Qiu within one to two weeks of discharge  2. Please call to confirm your follow-up appointment with your PCP Dr. Sparks ([phone]) on Thursday 1/24/18 for an INR check.     Please re-start your lasix dosage tomorrow 1/23.  Please take your antibiotic for a urinary tract infection as prescribed.  Please take pain medications (oxycodone) as prescribed if over the counter tylenol does not adequately control your pain. WOUND CARE: Please keep incisions clean and dry. Please do not scrub or rub incisions. Please to do not apply lotion or powder on incisions.   BATHING: Please do not submerge wound underwater. You may shower and/or sponge bathe.  ACTIVITY: No heavy lifting or straining. Otherwise, you may return to your usual level of physical activity. If you are taking narcotic pain medication (such as Percocet), do NOT drive a car, operate machinery or make important decisions.  DIET: Return to your usual diet.  NOTIFY YOUR SURGEON IF: You have any bleeding that does not stop, any pus draining from your wound, any fever (over 100.4 F) or chills, persistent nausea/vomiting, persistent diarrhea, or if your pain is not controlled on your discharge pain medications.    FOLLOW-UP:  1. Please call to make a follow-up appointment with Dr. Qiu within one to two weeks of discharge  2. Please call to confirm your follow-up appointment with your PCP Dr. Sparks ([phone]) on Thursday 1/24/18 for an INR check.     Please re-start your lasix dosage tomorrow 1/23.  Please take your antibiotic (Nitrofuratoin) for a urinary tract infection as prescribed.  Please take pain medications (oxycodone) as prescribed if over the counter tylenol does not adequately control your pain.

## 2019-01-22 NOTE — DISCHARGE NOTE ADULT - CONDITIONS AT DISCHARGE
Patient A&OX4. Abdominal lap sites, umbilical site with liquid bandage clean dry and intact. Denies complaint of pain. Tolerating diet. PO fluids taken well. o nausea or vomiting. Voiding freely. No signs and symptoms of dysuria. Out of bed ambulating safely. VSS.

## 2019-01-22 NOTE — DISCHARGE NOTE ADULT - MEDICATION SUMMARY - MEDICATIONS TO TAKE
I will START or STAY ON the medications listed below when I get home from the hospital:    oxyCODONE 5 mg oral capsule  -- 1 cap(s) by mouth every 4 hours, As Needed -for moderate pain MDD:6 tablets   -- Caution federal law prohibits the transfer of this drug to any person other  than the person for whom it was prescribed.  It is very important that you take or use this exactly as directed.  Do not skip doses or discontinue unless directed by your doctor.  May cause drowsiness.  Alcohol may intensify this effect.  Use care when operating dangerous machinery.  This prescription cannot be refilled.  Using more of this medication than prescribed may cause serious breathing problems.    -- Indication: For post op pain    warfarin 5 mg oral tablet  -- 1 tab(s) by mouth once a day MDD:1 tablet  -- Do not take this drug if you are pregnant.  It is very important that you take or use this exactly as directed.  Do not skip doses or discontinue unless directed by your doctor.  Obtain medical advice before taking any non-prescription drugs as some may affect the action of this medication.    -- Indication: For Mechanical valve    warfarin 4 mg oral tablet  -- 1 tab(s) by mouth once a day MDD:1 tablet  -- Do not take this drug if you are pregnant.  It is very important that you take or use this exactly as directed.  Do not skip doses or discontinue unless directed by your doctor.  Obtain medical advice before taking any non-prescription drugs as some may affect the action of this medication.    -- Indication: For Mechanical valve    gabapentin 100 mg oral capsule  -- 1 cap(s) by mouth 2 times a day (per pharmact last dispensed 11/2018 for 1 month supply- patient reports taking gabapentin, however, unable to recall dose/directions)  -- Indication: For home med    atorvastatin 40 mg oral tablet  -- 1 tab(s) by mouth once a day  -- Indication: For hyperlipidemia    Lasix 20 mg oral tablet  -- 1 tab(s) by mouth once a day, As Needed for lower extremity swelling (per patient only as needed, per pharmacy last filled 10/2018 for 7 day supply)  -- Indication: For lower extremity swelling    Fish Oil oral capsule  -- orally once a day  -- Indication: For home med    pantoprazole 40 mg oral delayed release tablet  -- 1 tab(s) by mouth once a day  -- Indication: For GERD    Macrobid 100 mg oral capsule  -- 1 cap(s) by mouth 2 times a day MDD:2 tablets  -- Finish all this medication unless otherwise directed by prescriber.  May discolor urine or feces.  Take with food or milk.    -- Indication: For Urinary tract infection    Calcium 500+D oral tablet, chewable  -- 1 tab(s) by mouth 2 times a day  -- Indication: For supplement    folic acid 1 mg oral tablet  -- 1 tab(s) by mouth once a day (no pharmacy record, however, patient reports taking)  -- Indication: For supplement I will START or STAY ON the medications listed below when I get home from the hospital:    oxyCODONE 5 mg oral capsule  -- 1 cap(s) by mouth every 4 hours, As Needed -for moderate pain MDD:6 tablets   -- Caution federal law prohibits the transfer of this drug to any person other  than the person for whom it was prescribed.  It is very important that you take or use this exactly as directed.  Do not skip doses or discontinue unless directed by your doctor.  May cause drowsiness.  Alcohol may intensify this effect.  Use care when operating dangerous machinery.  This prescription cannot be refilled.  Using more of this medication than prescribed may cause serious breathing problems.    -- Indication: For post op pain    warfarin 4 mg oral tablet  -- 1 tab(s) by mouth once a day MDD:1 tablet  -- Do not take this drug if you are pregnant.  It is very important that you take or use this exactly as directed.  Do not skip doses or discontinue unless directed by your doctor.  Obtain medical advice before taking any non-prescription drugs as some may affect the action of this medication.    -- Indication: For blood thinner    warfarin 5 mg oral tablet  -- 1 tab(s) by mouth once a day MDD:1 tablet  -- Do not take this drug if you are pregnant.  It is very important that you take or use this exactly as directed.  Do not skip doses or discontinue unless directed by your doctor.  Obtain medical advice before taking any non-prescription drugs as some may affect the action of this medication.    -- Indication: For blood thinner    gabapentin 100 mg oral capsule  -- 1 cap(s) by mouth 2 times a day (per pharmact last dispensed 11/2018 for 1 month supply- patient reports taking gabapentin, however, unable to recall dose/directions)  -- Indication: For home med    atorvastatin 40 mg oral tablet  -- 1 tab(s) by mouth once a day  -- Indication: For hyperlipidemia    Lasix 20 mg oral tablet  -- 1 tab(s) by mouth once a day, As Needed for lower extremity swelling (per patient only as needed, per pharmacy last filled 10/2018 for 7 day supply)  -- Indication: For lower extremity swelling    Fish Oil oral capsule  -- orally once a day  -- Indication: For home med    pantoprazole 40 mg oral delayed release tablet  -- 1 tab(s) by mouth once a day  -- Indication: For GERD    Macrobid 100 mg oral capsule  -- 1 cap(s) by mouth 2 times a day MDD:2 tablets  -- Finish all this medication unless otherwise directed by prescriber.  May discolor urine or feces.  Take with food or milk.    -- Indication: For Urinary tract infection    Calcium 500+D oral tablet, chewable  -- 1 tab(s) by mouth 2 times a day  -- Indication: For supplement    folic acid 1 mg oral tablet  -- 1 tab(s) by mouth once a day (no pharmacy record, however, patient reports taking)  -- Indication: For supplement

## 2019-01-22 NOTE — DISCHARGE NOTE ADULT - PATIENT PORTAL LINK FT
You can access the [facility] Patient Portal, offered by [facility], by registering with the following website: [email or web address]

## 2019-01-22 NOTE — DISCHARGE NOTE ADULT - CARE PROVIDER_API CALL
Nick Qiu), Surgery; Surgical Critical Care  [address] [place] [id number]  Phone: [phone]  Fax: [phone]    GRAHAM Sparks  Phone: [phone]  Fax: (   )    -

## 2019-01-22 NOTE — DISCHARGE NOTE ADULT - INSTRUCTIONS
no changes Follow up with MD. Take Coumadin 9mg tonight and tomorrow. Follow up appt on Thursday with Dr. Sparks. Avoid green leafy vegetables, use soft bristle tooth brush, if you fall you are prone to bruise and bleed. Continue and finish all Macrobid for UTI. If using pain medications drink fluids for they can be constipating. Do not drive, or drink alcohol while on pain medications. Notify MD if fever >100.4, pain not relieved by medications, nausea or vomiting or inability to tolerate diet occurs. You may shower. Do not submerge incision in water. Warm soapy water, pat dry. No lotions or powders at incision site.

## 2019-01-22 NOTE — PROGRESS NOTE ADULT - SUBJECTIVE AND OBJECTIVE BOX
ANESTHESIA POSTOP CHECK    38y Female POSTOP DAY 1 S/P Lap Ap    Vital Signs Last 24 Hrs  T(C): 36.7 (22 Jan 2019 10:07), Max: 37.2 (21 Jan 2019 23:50)  T(F): 98 (22 Jan 2019 10:07), Max: 99 (21 Jan 2019 23:50)  HR: 79 (22 Jan 2019 10:07) (67 - 86)  BP: 108/62 (22 Jan 2019 10:07) (100/59 - 127/75)  BP(mean): 77 (22 Jan 2019 02:00) (77 - 79)  RR: 18 (22 Jan 2019 10:07) (14 - 23)  SpO2: 97% (22 Jan 2019 10:07) (97% - 100%)  I&O's Summary    21 Jan 2019 07:01  -  22 Jan 2019 07:00  --------------------------------------------------------  IN: 740 mL / OUT: 1400 mL / NET: -660 mL    22 Jan 2019 07:01  -  22 Jan 2019 12:15  --------------------------------------------------------  IN: 240 mL / OUT: 300 mL / NET: -60 mL        [ X] NO APPARENT ANESTHESIA COMPLICATIONS      Comments:

## 2019-01-22 NOTE — CHART NOTE - NSCHARTNOTEFT_GEN_A_CORE
Post-operative Check  --TO BE UPDATED AT 4:00am--    SUBJECTIVE: No acute events in the immediate post-operative period. Pain well controlled. Tolerating PO intake.     OBJECTIVE:  T(C): 36.7 (01-22-19 @ 02:33), Max: 37.2 (01-21-19 @ 23:50)  HR: 71 (01-22-19 @ 02:33) (67 - 86)  BP: 108/66 (01-22-19 @ 02:33) (100/59 - 127/75)  RR: 20 (01-22-19 @ 02:33) (14 - 23)  SpO2: 99% (01-22-19 @ 02:33) (96% - 100%)      01-21-19 @ 07:01  -  01-22-19 @ 03:40  --------------------------------------------------------  IN: 300 mL / OUT: 600 mL / NET: -300 mL        Physical Exam:   General: well developed, well nourished, NAD  Neuro: alert and oriented, no focal deficits, moves all extremities spontaneously  HEENT: NCAT, EOMI, anicteric, mucosa moist  Respiratory: airway patent, respirations unlabored  CVS: regular rate and rhythm  Abdomen: soft, nontender, nondistended, trochar sites c/d  Extremities: no edema, sensation and movement grossly intact  Skin: warm, dry, appropriate color    ASSESSMENT:   ДМИТРИЙ ALY is a 38y Female with MVR POD#0 from laparoscopic appendectomy    PLAN:  - Regular diet  - IVF, consider IVL if PO intake increases.    - Pain management with PO meds - percocet  - Follow UOP  - f/u AM INR. If INR > 2.5 then dose coumadin 10mg daily. if INR < 2.5 bridge to coumadin with fondaparinux due to mechanical valve   - f/u AM labs  - Dispo: pending hospital course Post-operative Check  --TO BE UPDATED AT 4:00am--    SUBJECTIVE: No acute events in the immediate post-operative period. Pain well controlled. Tolerating PO intake. Denies nausea, vomiting, fevers, and chills.    OBJECTIVE:  T(C): 36.7 (01-22-19 @ 02:33), Max: 37.2 (01-21-19 @ 23:50)  HR: 71 (01-22-19 @ 02:33) (67 - 86)  BP: 108/66 (01-22-19 @ 02:33) (100/59 - 127/75)  RR: 20 (01-22-19 @ 02:33) (14 - 23)  SpO2: 99% (01-22-19 @ 02:33) (96% - 100%)      01-21-19 @ 07:01  -  01-22-19 @ 03:40  --------------------------------------------------------  IN: 300 mL / OUT: 600 mL / NET: -300 mL        Physical Exam:   General: well developed, well nourished, NAD  Neuro: alert and oriented, no focal deficits, moves all extremities spontaneously  HEENT: NCAT, EOMI, anicteric, mucosa moist  Respiratory: airway patent, respirations unlabored  CVS: regular rate and rhythm  Abdomen: soft, nontender, nondistended, trochar sites c/d  Extremities: no edema, sensation and movement grossly intact  Skin: warm, dry, appropriate color    ASSESSMENT:   ДМИТРИЙ ALY is a 38y Female with MVR POD#0 from laparoscopic appendectomy    PLAN:  - Regular diet  - IVF, consider IVL if PO intake increases.    - Pain management with PO meds - percocet  - Follow UOP  - f/u AM INR. If INR > 2.5 then dose coumadin 10mg daily. if INR < 2.5 bridge to coumadin with fondaparinux due to mechanical valve   - f/u AM labs  - Dispo: pending hospital course Post-operative Check  --TO BE UPDATED AT 4:00am--    SUBJECTIVE: No acute events in the immediate post-operative period. Pain well controlled. Tolerating liquid PO intake. Patient has not tried solid food. Denies nausea, vomiting, fevers, and chills. States that she feels bloated.     OBJECTIVE:  T(C): 36.7 (01-22-19 @ 02:33), Max: 37.2 (01-21-19 @ 23:50)  HR: 71 (01-22-19 @ 02:33) (67 - 86)  BP: 108/66 (01-22-19 @ 02:33) (100/59 - 127/75)  RR: 20 (01-22-19 @ 02:33) (14 - 23)  SpO2: 99% (01-22-19 @ 02:33) (96% - 100%)      01-21-19 @ 07:01  -  01-22-19 @ 03:40  --------------------------------------------------------  IN: 300 mL / OUT: 600 mL / NET: -300 mL        Physical Exam:   General: well developed, well nourished, NAD  Neuro: alert and oriented, no focal deficits, moves all extremities spontaneously  HEENT: NCAT, EOMI, anicteric, mucosa moist  Respiratory: airway patent, respirations unlabored  CVS: regular rate and rhythm  Abdomen: soft, TTP around umbilicus, moderately distended due to insufflation, trochar sites c/d with dermabond   Extremities: no edema, sensation and movement grossly intact  Skin: warm, dry, appropriate color    ASSESSMENT:   ДМИТРИЙ ALY is a 38y Female with MVR POD#0 from laparoscopic appendectomy    PLAN:  - Regular diet  - IVF, consider IVL if PO intake increases.    - Pain management with PO meds - percocet  - Follow UOP  - f/u AM INR. If INR > 2.5 then dose coumadin 10mg daily. if INR < 2.5 bridge to coumadin with fondaparinux due to mechanical valve   - f/u AM labs  - Dispo: pending hospital course

## 2019-01-22 NOTE — PROGRESS NOTE ADULT - ASSESSMENT
A: 37 yo F with h/o MVR s/p replacement 06/2017 on Coumadin p/w abd pain, admitted with appendicitis, now s/p lap appy on 1/22 (early morning).    P:  - INR was 2 on labs this AM, placed on fondaparinux 7.5mg, however, per pt's PCP- Dr. Sparks ([phone]), he is okay with the pt being discharged with an INR of 2 and getting 9 coumadin today and tomorrow with a f/u on Thurs to re-check INR.   - c/w reg diet  - OOB/IS  - DC today    Ashley Shaffer, PGY-1  [facility] General Surgery- B Team  [id number]

## 2019-01-22 NOTE — PROGRESS NOTE ADULT - SUBJECTIVE AND OBJECTIVE BOX
General Surgery Progress Note    SUBJECTIVE:  The patient was seen and examined. No acute events overnight. POD 1 s/p lap appy. Pain well controlled. Denies N/V, joe reg diet. Pt wishes to be discharged today.     OBJECTIVE:     ** VITAL SIGNS / I&O's **    Vital Signs Last 24 Hrs  T(C): 36.7 (2019 10:07), Max: 37.2 (2019 23:50)  T(F): 98 (2019 10:07), Max: 99 (2019 23:50)  HR: 79 (2019 10:07) (67 - 86)  BP: 108/62 (2019 10:07) (100/59 - 127/75)  BP(mean): 77 (2019 02:00) (77 - 79)  RR: 18 (2019 10:07) (14 - 23)  SpO2: 97% (2019 10:07) (96% - 100%)      2019 07:01  -  2019 07:00  --------------------------------------------------------  IN:    lactated ringers.: 300 mL    Oral Fluid: 440 mL  Total IN: 740 mL    OUT:    Voided: 1400 mL  Total OUT: 1400 mL    Total NET: -660 mL      2019 07:01  -  2019 10:28  --------------------------------------------------------  IN:  Total IN: 0 mL    OUT:    Voided: 300 mL  Total OUT: 300 mL    Total NET: -300 mL          ** PHYSICAL EXAM **    -- CONSTITUTIONAL: Alert, NAD  -- PULMONARY: non-labored respirations  -- ABDOMEN: soft, non-distended, ATTP, incisions covered with dermabond  -- NEURO: A&Ox3    ** LABS **                          11.9   9.26  )-----------( 247      ( 2019 07:45 )             37.2     2019 07:45    136    |  102    |  8      ----------------------------<  145    4.3     |  22     |  0.55     Ca    8.7        2019 07:45    TPro  7.3    /  Alb  4.0    /  TBili  0.7    /  DBili  x      /  AST  18     /  ALT  21     /  AlkPhos  62     2019 07:45    PT/INR - ( 2019 07:45 )   PT: 22.7 SEC;   INR: 2.00          PTT - ( 2019 07:45 )  PTT:40.0 SEC  CAPILLARY BLOOD GLUCOSE            LIVER FUNCTIONS - ( 2019 07:45 )  Alb: 4.0 g/dL / Pro: 7.3 g/dL / ALK PHOS: 62 u/L / ALT: 21 u/L / AST: 18 u/L / GGT: x             Urinalysis Basic - ( 2019 12:38 )    Color: COLORLESS / Appearance: CLEAR / S.006 / pH: 6.5  Gluc: NEGATIVE / Ketone: NEGATIVE  / Bili: NEGATIVE / Urobili: NORMAL   Blood: NEGATIVE / Protein: NEGATIVE / Nitrite: NEGATIVE   Leuk Esterase: NEGATIVE / RBC: x / WBC x   Sq Epi: x / Non Sq Epi: x / Bacteria: x        MEDICATIONS  (STANDING):  atorvastatin 40 milliGRAM(s) Oral at bedtime  folic acid 1 milliGRAM(s) Oral daily  fondaparinux Injectable 7.5 milliGRAM(s) SubCutaneous daily  gabapentin 100 milliGRAM(s) Oral daily  lactated ringers. 1000 milliLiter(s) (50 mL/Hr) IV Continuous <Continuous>  pantoprazole    Tablet 40 milliGRAM(s) Oral before breakfast  warfarin 8.5 milliGRAM(s) Oral once    MEDICATIONS  (PRN):  oxyCODONE    5 mG/acetaminophen 325 mG 1 Tablet(s) Oral every 4 hours PRN Moderate Pain (4 - 6)  oxyCODONE    5 mG/acetaminophen 325 mG 2 Tablet(s) Oral every 4 hours PRN Severe Pain (7 - 10)

## 2019-01-22 NOTE — DISCHARGE NOTE ADULT - HOSPITAL COURSE
37 yo F with PMHx of MR and MVR s/p replacement 06/2017 on Coumadin, HTN, and HLD who presented to [facility] with one day of abd pain. CT demonstrated appendicitis. Pt went to the OR on 1/22 for a laparoscopic appendectomy. Patient tolerated the procedure well and was transferred to the floor in stable condition.  On POD #1, the patient's diet was advanced as tolerated and was placed on PO pain medication.  Once patient was ambulating well, voiding without difficulty, and tolerating a full diet, they were found to be stable for discharge to home.  Pain was well-controlled at time of discharge.    Due to patient's h/o MVR with a mechanical valve, an INR was checked on 1/22 for which it was 2.0. The surgical team spoke to the pt's PCP Dr. Sparks ([phone]), who stated that pt can be discharged today 1/22 with an INR of 2, and to take 9mg coumadin today and tomorrow, with a follow-up appointment on Thursday 1/24 to re-check INR.     Pt was made aware of these plans and understands all information presented. Pt was also instructed to begin home dose of lasix tomorrow 1/23 and to start the prescribed antibiotic for her urinary tract infection.     Pt is stable for discharge today and understands all information discussed. 37 yo F with PMHx of MR and MVR s/p replacement 06/2017 on Coumadin, HTN, and HLD who presented to [facility] with one day of abd pain. CT demonstrated appendicitis. Pt went to the OR on 1/22 for a laparoscopic appendectomy. Patient tolerated the procedure well and was transferred to the floor in stable condition.  On POD #1, the patient's diet was advanced as tolerated and was placed on PO pain medication.  Once patient was ambulating well, voiding without difficulty, and tolerating a full diet, they were found to be stable for discharge to home.  Pain was well-controlled at time of discharge.    Due to patient's h/o MVR with a mechanical valve, an INR was checked on 1/22 for which it was 2.0. The surgical team spoke to the pt's PCP Dr. Sparks ([phone]), who stated that pt can be discharged today 1/22 with an INR of 2, and to take 9mg coumadin today and tomorrow, with a follow-up appointment on Thursday 1/24 to re-check INR.     Pt was made aware of these plans and understands all information presented. Pt was also instructed to begin home dose of lasix tomorrow 1/23 and to start the prescribed antibiotic (nitrofuratoin) for her urinary tract infection.     Pt is stable for discharge today and understands all information discussed.

## 2019-01-22 NOTE — DISCHARGE NOTE ADULT - PLAN OF CARE
resolution of abd pain Laparoscopic appendectomy was performed.   Please follow up with Dr. Qiu in 1-2 wks. INR on 1/22 was 2.0, therefore, pt was bridged to coumadin with fondiparinux. After speaking to pt's PCP Dr. Sparks ([phone]), he noted that pt would be able to be discharged home with that INR level and receive 9 mg of coumadin today and tomorrow, with a follow-up appt scheduled on Thursday with Dr. Sparks for an INR check.

## 2019-01-22 NOTE — DISCHARGE NOTE ADULT - CARE PLAN
Principal Discharge DX:	Appendicitis  Goal:	resolution of abd pain  Assessment and plan of treatment:	Laparoscopic appendectomy was performed.   Please follow up with Dr. Qiu in 1-2 wks.  Secondary Diagnosis:	MVP (mitral valve prolapse)  Assessment and plan of treatment:	INR on 1/22 was 2.0, therefore, pt was bridged to coumadin with fondiparinux. After speaking to pt's PCP Dr. Sparks ([phone]), he noted that pt would be able to be discharged home with that INR level and receive 9 mg of coumadin today and tomorrow, with a follow-up appt scheduled on Thursday with Dr. Sparks for an INR check.

## 2019-01-22 NOTE — BRIEF OPERATIVE NOTE - PROCEDURE
<<-----Click on this checkbox to enter Procedure Laparoscopic appendectomy  01/22/2019    Active  AGAINES

## 2019-01-22 NOTE — DISCHARGE NOTE ADULT - MEDICATION SUMMARY - MEDICATIONS TO CHANGE
I will SWITCH the dose or number of times a day I take the medications listed below when I get home from the hospital:    Coumadin 7.5 mg oral tablet  -- 1 tab(s) by mouth once a day (in addition to 1mg for a TDD of 8.5mg/day)    Coumadin 1 mg oral tablet  -- 1 tab(s) by mouth once a day (in addition to 7.5mg for a TDD of 8.5mg/day) I will SWITCH the dose or number of times a day I take the medications listed below when I get home from the hospital:  None

## 2019-01-23 LAB
-  AMIKACIN: SIGNIFICANT CHANGE UP
-  AMPICILLIN/SULBACTAM: SIGNIFICANT CHANGE UP
-  AMPICILLIN: SIGNIFICANT CHANGE UP
-  AZTREONAM: SIGNIFICANT CHANGE UP
-  CEFAZOLIN: SIGNIFICANT CHANGE UP
-  CEFEPIME: SIGNIFICANT CHANGE UP
-  CEFOXITIN: SIGNIFICANT CHANGE UP
-  CEFTAZIDIME: SIGNIFICANT CHANGE UP
-  CEFTRIAXONE: SIGNIFICANT CHANGE UP
-  CIPROFLOXACIN: SIGNIFICANT CHANGE UP
-  ERTAPENEM: SIGNIFICANT CHANGE UP
-  GENTAMICIN: SIGNIFICANT CHANGE UP
-  IMIPENEM: SIGNIFICANT CHANGE UP
-  LEVOFLOXACIN: SIGNIFICANT CHANGE UP
-  MEROPENEM: SIGNIFICANT CHANGE UP
-  NITROFURANTOIN: SIGNIFICANT CHANGE UP
-  PIPERACILLIN/TAZOBACTAM: SIGNIFICANT CHANGE UP
-  TOBRAMYCIN: SIGNIFICANT CHANGE UP
-  TRIMETHOPRIM/SULFAMETHOXAZOLE: SIGNIFICANT CHANGE UP
BACTERIA UR CULT: SIGNIFICANT CHANGE UP
METHOD TYPE: SIGNIFICANT CHANGE UP
ORGANISM # SPEC MICROSCOPIC CNT: SIGNIFICANT CHANGE UP
ORGANISM # SPEC MICROSCOPIC CNT: SIGNIFICANT CHANGE UP

## 2019-02-09 NOTE — ED ADULT NURSE NOTE - DOES PATIENT HAVE ADVANCE DIRECTIVE
AYDEE,NP IN ROOM WITH PATIENT AT THIS TIME.
norco 5 given for back pain 10/10. pt has tolerated well before.
No

## 2019-04-08 ENCOUNTER — EMERGENCY (EMERGENCY)
Facility: HOSPITAL | Age: 39
LOS: 1 days | Discharge: HOME | End: 2019-04-08
Attending: EMERGENCY MEDICINE | Admitting: EMERGENCY MEDICINE
Payer: COMMERCIAL

## 2019-04-08 VITALS
OXYGEN SATURATION: 99 % | TEMPERATURE: 98 F | DIASTOLIC BLOOD PRESSURE: 77 MMHG | SYSTOLIC BLOOD PRESSURE: 143 MMHG | HEART RATE: 76 BPM | RESPIRATION RATE: 18 BRPM

## 2019-04-08 DIAGNOSIS — Z95.2 PRESENCE OF PROSTHETIC HEART VALVE: Chronic | ICD-10-CM

## 2019-04-08 DIAGNOSIS — Z98.891 HISTORY OF UTERINE SCAR FROM PREVIOUS SURGERY: Chronic | ICD-10-CM

## 2019-04-08 PROCEDURE — 99285 EMERGENCY DEPT VISIT HI MDM: CPT | Mod: 25

## 2019-04-08 PROCEDURE — 93010 ELECTROCARDIOGRAM REPORT: CPT

## 2019-04-08 NOTE — ED PROVIDER NOTE - PSH
S/P     S/P MVR (mitral valve replacement)  Mechanical MVR at [facility] on 17

## 2019-04-08 NOTE — ED PROVIDER NOTE - CHIEF COMPLAINT
The patient is a 38y Female complaining of The patient is a 38y Female complaining of shortness of breath

## 2019-04-08 NOTE — ED PROVIDER NOTE - OBJECTIVE STATEMENT
History is from EMS and triage notes. Unable to obtain from patient as she is not speaking. EMS was called when the patient developed shortness of breath at home, initially was speaking then stopped speaking when EMS arrived. Appeared to have gasping respirations which have since improved. History is from EMS and triage notes. Unable to obtain from patient as she is not speaking. EMS was called when the patient developed shortness of breath at home, initially was speaking then stopped speaking when EMS arrived. Appeared to have gasping respirations which have since improved.  Klepfish: PT awake, not talking. Unable to contact numbers listed in chart. 38F per triage note "pt bib ems, call came over as a difficulty breathing, on ems arrival pt was sitting on the floor "gasping for air," patient was conversing with her  on scene and was asking for water, spo2 100%, patient not answering questions in triage, minimally responsive to verbal stimuli, f/s 112 in field, pmh mitral valve prolapse, htn, gerd, hld." Pt not giving any additional hx. Chart review indicates that pt had possible similar aphasic episode 2017, psych eval, no clear etiology, eventually resolved.

## 2019-04-08 NOTE — ED PROVIDER NOTE - CLINICAL SUMMARY MEDICAL DECISION MAKING FREE TEXT BOX
Jonathan Weil, PGY2 - question of conner vs cva vs acute encephalitis. Prior hx of same with resolution and no objective findings that eval suggests encephalitis or vascular neuro process is unlikely. Will check CT head, cxr given report of dyspnea (though no objective respiratory concerns on exam), labs, trial of benzos and reassess.

## 2019-04-08 NOTE — ED ADULT TRIAGE NOTE - CHIEF COMPLAINT QUOTE
pt bib ems, call came over as a difficulty breathing, on ems arrival pt was sitting on the floor "gasping for air," patient was conversing with her  on scene and was asking for water, spo2 100%, patient not answering questions in triage, minimally responsive to verbal stimuli, f/s 112 in field, pmh mitral valve prolapse, htn, gerd, hld. pt bib ems, call came over as a difficulty breathing, on ems arrival pt was sitting on the floor "gasping for air," patient was conversing with her  on scene and was asking for water, spo2 100%, patient not answering questions in triage, minimally responsive to verbal stimuli, f/s 112 in field, pmh mitral valve prolapse, htn, gerd, hld. md powell called for stroke eval. pt bib ems, call came over as a difficulty breathing, on ems arrival pt was sitting on the floor "gasping for air," patient was conversing with her  on scene and was asking for water, spo2 100%, patient not answering questions in triage, minimally responsive to verbal stimuli, f/s 112 in field, pmh mitral valve prolapse, htn, gerd, hld. md powell called for stroke eval. NO code stroke called.

## 2019-04-08 NOTE — ED PROVIDER NOTE - PHYSICAL EXAMINATION
not talking, falt affect. following commands. Strength 5/5. PERRL, EOMI, no  nystagmus. EOS 0.26 although 37 5/7 wga/No

## 2019-04-08 NOTE — ED PROVIDER NOTE - NSFOLLOWUPINSTRUCTIONS_ED_ALL_ED_FT
You can take acetaminophen (aka tylenol, 1000 mg every 6 hours) or ibuprofen (600 mg every 6 hours) for pain or fever. Do not exceed 4000 mg acetaminophen (tylenol) in a 24 hour period. Be aware if any of your other medications contain acetaminophen so as not to exceed the maximum daily dose.    Follow up with your primary physician in 2-3 days. If needed call [phone] to find a primary care physician or call  [phone] to schedule an appointment with the general medicine clinic.     Follow up with orthopedic surgery in 2-3 days.    Return to the ER for recurrent difficulty breathing, chest pain, difficulty speaking, weakness, numbness, or any other new concerning symptoms.

## 2019-04-08 NOTE — ED PROVIDER NOTE - PMH
HLD (hyperlipidemia)    HTN (hypertension)    MR (mitral regurgitation)  S/P mechanical MVR on 6/7/17 at [facility]  MVP (mitral valve prolapse)    Osteomyelitis of arm  Right arm osteomyelitis, treated in [facility] in 1998

## 2019-04-08 NOTE — ED PROVIDER NOTE - PROGRESS NOTE DETAILS
Liss BRIAN: Called for stroke eval. As per family and EMS pt was feeling SOB and EMS called. When EMS came she stopped talking and would not talk to EMS. Would whisper to . As per  pt has done this before with negative workup. Pt completely unresponsive to provider. Will not answer questions or cooperate with exam. PERRL. No facial droop. Retracts to pain. No focal deficits. Code stroke not called as pt was conversing with , likely not aphasia. Jonathan Weil, PGY2 - spoke w/ the patient's , who reports the patient developed shortness of breath abruptly while at home, no known inciting factor, sat down on the floor, was communicating normally until EMS arrived. Hx similar issue of aphasia for which she was evaluated in the hospital a few years ago, no etiology discovered, recovered spontaneously. Was in her normal state of health until start of these events. No new stressors or atypical events at home. Jonathan Weil, PGY2 - patient re-evaluated. Now speaking, following commands. Jonathan Weil, PGY2 - patient interviewed after relaying she speaks Latvian,  ID#[id number]. She confirms the previously known history that she became short of breath while at home, and also notes she had a left sided chest pain with this. Both the dyspnea and the chest pain are improved now. She is unable to relate any details about why she could not speak when this occurred. W/u including CT head, delta trop, cxr negative. She is now well appearing and interacting appropriately. Low suspicion for a central neurologic process given the context, improvement after benzo trial, age, and prior events with similar sxs and negative neurologic eval. Will dc home, her  was contacted and is on the way to the hospital. Jonathan Weil, PGY2 - patient with normal neuro exam at time of discharge. Ambulatory with assistance of a cane - per  she has been using a walker at home for over a month now due to L knee pain. No ortho or sports med f/u, seen by her pcp and provided w/ analgesia but w/o relief. Will provide ortho referral and analgesic recs. Discussed f/u and return precautions. Klepfish: Has pain to lateral/posterior aspect of L distal thigh. FROM all extremities, no joint warmth. minimal overlying ttp. no induration/fluctuance. Likely tendonitis. PT otherwise asymptoamtic and interacting normally.  at bedside. comfortable for dc, outpt pmd/ortho f/u. Klepfish: Has pain to lateral/posterior aspect of L distal thigh. FROM all extremities, no joint warmth. minimal overlying ttp. no induration/fluctuance. Likely tendonitis. PT otherwise asymptoamtic and interacting normally.  at bedside. comfortable for dc, outpt pmd/ortho f/u. steady slow gait w/ cane (uses walker at home)

## 2019-04-08 NOTE — ED PROVIDER NOTE - NSFOLLOWUPCLINICS_GEN_ALL_ED_FT
[facility] Orthopedic Surgery  Orthopedic Surgery  [address], [address]rd & [address]th [place] [address]  Phone: [phone]  Fax:   Follow Up Time:

## 2019-04-08 NOTE — ED PROVIDER NOTE - ATTENDING CONTRIBUTION TO CARE
38F PMH mitral valve replacement on coumadin, HTN, GERD, HLD p/w possible SOB, when EMS on scene pt was asking for water, however now pt is not answering any questions. Possible similar aphasic episode  in the past. Unable to obtain any additional info. Vitals wnl, exam as above. Well appearing, no resp distress.  Uddx: Unclear etiology. Appears like catatonia. Low suspicion for pulm or cardiac etiology.  CBC, cmp, tsh, tox, trop, CXR, CTH, trial benzo, reassess.

## 2019-04-08 NOTE — ED PROVIDER NOTE - NS ED ATTENDING STATEMENT MOD
Urine Pregnancy Test Result: negative Detail Level: None I have personally seen and examined this patient.  I have fully participated in the care of this patient. I have reviewed all pertinent clinical information, including history, physical exam, plan and the Resident’s note and agree except as noted.

## 2019-04-09 VITALS
RESPIRATION RATE: 16 BRPM | HEART RATE: 78 BPM | TEMPERATURE: 98 F | SYSTOLIC BLOOD PRESSURE: 147 MMHG | OXYGEN SATURATION: 99 % | DIASTOLIC BLOOD PRESSURE: 89 MMHG

## 2019-04-09 LAB
ALBUMIN SERPL ELPH-MCNC: 4.4 G/DL — SIGNIFICANT CHANGE UP (ref 3.3–5)
ALP SERPL-CCNC: 61 U/L — SIGNIFICANT CHANGE UP (ref 40–120)
ALT FLD-CCNC: 18 U/L — SIGNIFICANT CHANGE UP (ref 4–33)
ANION GAP SERPL CALC-SCNC: 13 MMO/L — SIGNIFICANT CHANGE UP (ref 7–14)
APAP SERPL-MCNC: < 15 UG/ML — LOW (ref 15–25)
APTT BLD: 43.6 SEC — HIGH (ref 27.5–36.3)
AST SERPL-CCNC: 21 U/L — SIGNIFICANT CHANGE UP (ref 4–32)
BASOPHILS # BLD AUTO: 0.01 K/UL — SIGNIFICANT CHANGE UP (ref 0–0.2)
BASOPHILS NFR BLD AUTO: 0.1 % — SIGNIFICANT CHANGE UP (ref 0–2)
BILIRUB SERPL-MCNC: 0.4 MG/DL — SIGNIFICANT CHANGE UP (ref 0.2–1.2)
BUN SERPL-MCNC: 17 MG/DL — SIGNIFICANT CHANGE UP (ref 7–23)
CALCIUM SERPL-MCNC: 9 MG/DL — SIGNIFICANT CHANGE UP (ref 8.4–10.5)
CHLORIDE SERPL-SCNC: 104 MMOL/L — SIGNIFICANT CHANGE UP (ref 98–107)
CO2 SERPL-SCNC: 21 MMOL/L — LOW (ref 22–31)
CREAT SERPL-MCNC: 0.61 MG/DL — SIGNIFICANT CHANGE UP (ref 0.5–1.3)
EOSINOPHIL # BLD AUTO: 0.06 K/UL — SIGNIFICANT CHANGE UP (ref 0–0.5)
EOSINOPHIL NFR BLD AUTO: 0.7 % — SIGNIFICANT CHANGE UP (ref 0–6)
ETHANOL BLD-MCNC: < 10 MG/DL — SIGNIFICANT CHANGE UP
GLUCOSE SERPL-MCNC: 117 MG/DL — HIGH (ref 70–99)
HCG SERPL-ACNC: < 5 MIU/ML — SIGNIFICANT CHANGE UP
HCT VFR BLD CALC: 38.4 % — SIGNIFICANT CHANGE UP (ref 34.5–45)
HGB BLD-MCNC: 11.8 G/DL — SIGNIFICANT CHANGE UP (ref 11.5–15.5)
IMM GRANULOCYTES NFR BLD AUTO: 0.1 % — SIGNIFICANT CHANGE UP (ref 0–1.5)
INR BLD: 2.27 — HIGH (ref 0.88–1.17)
LYMPHOCYTES # BLD AUTO: 2.9 K/UL — SIGNIFICANT CHANGE UP (ref 1–3.3)
LYMPHOCYTES # BLD AUTO: 35.1 % — SIGNIFICANT CHANGE UP (ref 13–44)
MCHC RBC-ENTMCNC: 25.2 PG — LOW (ref 27–34)
MCHC RBC-ENTMCNC: 30.7 % — LOW (ref 32–36)
MCV RBC AUTO: 82.1 FL — SIGNIFICANT CHANGE UP (ref 80–100)
MONOCYTES # BLD AUTO: 0.4 K/UL — SIGNIFICANT CHANGE UP (ref 0–0.9)
MONOCYTES NFR BLD AUTO: 4.8 % — SIGNIFICANT CHANGE UP (ref 2–14)
NEUTROPHILS # BLD AUTO: 4.88 K/UL — SIGNIFICANT CHANGE UP (ref 1.8–7.4)
NEUTROPHILS NFR BLD AUTO: 59.2 % — SIGNIFICANT CHANGE UP (ref 43–77)
NRBC # FLD: 0 K/UL — SIGNIFICANT CHANGE UP (ref 0–0)
PLATELET # BLD AUTO: 264 K/UL — SIGNIFICANT CHANGE UP (ref 150–400)
PMV BLD: 10.2 FL — SIGNIFICANT CHANGE UP (ref 7–13)
POTASSIUM SERPL-MCNC: 3.9 MMOL/L — SIGNIFICANT CHANGE UP (ref 3.5–5.3)
POTASSIUM SERPL-SCNC: 3.9 MMOL/L — SIGNIFICANT CHANGE UP (ref 3.5–5.3)
PROT SERPL-MCNC: 7.2 G/DL — SIGNIFICANT CHANGE UP (ref 6–8.3)
PROTHROM AB SERPL-ACNC: 25.9 SEC — HIGH (ref 9.8–13.1)
RBC # BLD: 4.68 M/UL — SIGNIFICANT CHANGE UP (ref 3.8–5.2)
RBC # FLD: 14.1 % — SIGNIFICANT CHANGE UP (ref 10.3–14.5)
SALICYLATES SERPL-MCNC: < 5 MG/DL — LOW (ref 15–30)
SODIUM SERPL-SCNC: 138 MMOL/L — SIGNIFICANT CHANGE UP (ref 135–145)
TROPONIN T, HIGH SENSITIVITY: 14 NG/L — SIGNIFICANT CHANGE UP (ref ?–14)
TROPONIN T, HIGH SENSITIVITY: 15 NG/L — SIGNIFICANT CHANGE UP (ref ?–14)
TSH SERPL-MCNC: 2.41 UIU/ML — SIGNIFICANT CHANGE UP (ref 0.27–4.2)
WBC # BLD: 8.26 K/UL — SIGNIFICANT CHANGE UP (ref 3.8–10.5)
WBC # FLD AUTO: 8.26 K/UL — SIGNIFICANT CHANGE UP (ref 3.8–10.5)

## 2019-04-09 PROCEDURE — 70450 CT HEAD/BRAIN W/O DYE: CPT | Mod: 26

## 2019-04-09 PROCEDURE — 71045 X-RAY EXAM CHEST 1 VIEW: CPT | Mod: 26

## 2019-04-09 RX ORDER — IBUPROFEN 200 MG
600 TABLET ORAL ONCE
Qty: 0 | Refills: 0 | Status: COMPLETED | OUTPATIENT
Start: 2019-04-09 | End: 2019-04-09

## 2019-04-09 RX ORDER — ACETAMINOPHEN 500 MG
975 TABLET ORAL ONCE
Qty: 0 | Refills: 0 | Status: COMPLETED | OUTPATIENT
Start: 2019-04-09 | End: 2019-04-09

## 2019-04-09 RX ADMIN — Medication 1 MILLIGRAM(S): at 02:27

## 2019-04-09 RX ADMIN — Medication 975 MILLIGRAM(S): at 06:14

## 2019-04-09 RX ADMIN — Medication 600 MILLIGRAM(S): at 06:14

## 2019-04-09 NOTE — ED ADULT NURSE NOTE - NSIMPLEMENTINTERV_GEN_ALL_ED
Implemented All Fall with Harm Risk Interventions:  [place] to call system. Call bell, personal items and telephone within reach. Instruct patient to call for assistance. Room bathroom lighting operational. Non-slip footwear when patient is off stretcher. Physically safe environment: no spills, clutter or unnecessary equipment. Stretcher in lowest position, wheels locked, appropriate side rails in place. Provide visual cue, wrist band, yellow gown, etc. Monitor gait and stability. Monitor for mental status changes and reorient to person, place, and time. Review medications for side effects contributing to fall risk. Reinforce activity limits and safety measures with patient and family. Provide visual clues: red socks.

## 2019-04-09 NOTE — ED ADULT NURSE NOTE - OBJECTIVE STATEMENT
pt awake, alert, responsive to tactile and verbal stimuli, able to follow commands, bibems for sob and currently presenting drowsy. pt not responding to questioning. nad noted. respirations even and unlabored. vss. md assessed. will monitor. left ac 20g placed labs drawn and sent. no obvious s/s of injury

## 2019-04-09 NOTE — ED ADULT NURSE NOTE - CHIEF COMPLAINT QUOTE
pt bib ems, call came over as a difficulty breathing, on ems arrival pt was sitting on the floor "gasping for air," patient was conversing with her  on scene and was asking for water, spo2 100%, patient not answering questions in triage, minimally responsive to verbal stimuli, f/s 112 in field, pmh mitral valve prolapse, htn, gerd, hld. md powell called for stroke eval. NO code stroke called.

## 2019-05-01 NOTE — PATIENT PROFILE ADULT. - NS TRANSFER DISPOSITION PATIENT BELONGINGS
LOS: 0 days   Patient Care Team:  Rosa Zamora MD as PCP - General (Internal Medicine)  Geremias Shepherd MD as Consulting Physician (General Surgery)    Chief Complaint:  anemia    Subjective     Interval History:     No acute events overnight.  Noted FOBT positive.  Denies brbpr or melena.  Denies abdominal pain.  Denies abdominal pain.  Hgb stable and is at baseline.  No new complaints.  Patient seems anxious for discharge.      Review of Systems:   All systems were reviewed and negative except for:  Musculoskeletal: positive for  muscle weakness    Objective     Vital Signs  Temp:  [98 °F (36.7 °C)-98.8 °F (37.1 °C)] 98.8 °F (37.1 °C)  Heart Rate:  [108-111] 110  Resp:  [16-17] 17  BP: (127-147)/(80-88) 140/87    Physical Exam   Constitutional: She is oriented to person, place, and time. She appears well-developed and well-nourished. No distress.   HENT:   Head: Normocephalic and atraumatic.   Cardiovascular: Normal rate and regular rhythm.   Pulmonary/Chest: Effort normal. No respiratory distress.   Abdominal: Soft. She exhibits no distension. There is no tenderness.   Neurological: She is alert and oriented to person, place, and time.   Skin: Skin is warm and dry.   Psychiatric: She has a normal mood and affect. Her behavior is normal.          Results Review:    I reviewed the patient's new clinical results.    Results from last 7 days   Lab Units 05/01/19  0545 04/30/19  0536 04/29/19  2353   SODIUM mmol/L 138 137 137   POTASSIUM mmol/L 5.1 5.0 4.9   CHLORIDE mmol/L 110* 108* 106   CO2 mmol/L 18.0* 20.0* 21.0*   BUN mg/dL 82* 96* 90*   CREATININE mg/dL 1.70* 1.80* 1.80*   CALCIUM mg/dL 9.5 9.5 9.6   GLUCOSE mg/dL 170* 192* 177*       Results from last 7 days   Lab Units 05/01/19  0545 04/30/19  1045 04/30/19  0536 04/29/19  1335   WBC 10*3/mm3 5.28  --  4.98 4.93   HEMOGLOBIN g/dL 9.2*  --  9.5* 9.9*   HEMATOCRIT % 29.6* 29.9* 30.7* 31.5*   PLATELETS 10*3/mm3 158  --  155 143         Medication Review:    Scheduled Meds:  aspirin 81 mg Oral Daily   atorvastatin 10 mg Oral Nightly   bisoprolol 10 mg Oral Q24H   docusate sodium 100 mg Oral BID   ertapenem 1 g Intravenous Q24H   ferrous sulfate 324 mg Oral BID With Meals   gabapentin 300 mg Oral Nightly   hydrALAZINE 10 mg Oral BID   insulin aspart 0-9 Units Subcutaneous 4x Daily AC & at Bedtime   isosorbide mononitrate 15 mg Oral Daily   levothyroxine 150 mcg Oral Q AM   lidocaine 1 patch Transdermal Q24H   multivitamin with minerals 1 tablet Oral Daily   neomycin-bacitracin-polymyxin  Topical Daily   pantoprazole 40 mg Oral Q AM   polyethylene glycol 17 g Oral BID   saccharomyces boulardii 250 mg Oral BID   sodium chloride 3 mL Intravenous Q12H   ascorbic acid 500 mg Oral Daily   zinc sulfate 220 mg Oral BID     Continuous Infusions:  sodium chloride 100 mL/hr Last Rate: 100 mL/hr (05/01/19 1316)     PRN Meds:.•  acetaminophen  •  ammonium lactate  •  dextrose  •  dextrose  •  glucagon (human recombinant)  •  ondansetron  •  sodium chloride      Assessment/Plan       Acute renal failure (ARF) (CMS/[facility])    Hyperkalemia    Ms. Mckeon is a 62-year-old female patient with chronic, normocytic anemia, which appears relatively unchanged.  Her stool was positive for occult blood.  I discussed this with her in detail.  She had an EGD and colonoscopy in October 2018 by .  These results were reviewed in care everywhere.  I suspect her anemia is multifactorial.  She does not have any clinical evidence of overt GI hemorrhage.  We discussed the option of proceeding with repeat endoscopy while she is hospitalized, and the patient is not interested in pursuing repeat endoscopy while she is admitted.  Her main focus is returning to rehab.  She is asymptomatic regarding her abdomen.   Given this discussion with the patient, and the fact that she does not particularly wish to pursue invasive procedures at this time, she may follow-up with me or 1 of my partners as an  outpatient for further discussions regarding repeat EGD and colonoscopy.    Lisa Cardoso MD  05/01/19  6:23 PM         with patient

## 2019-07-10 NOTE — DISCHARGE NOTE ADULT - MEDICATION SUMMARY - MEDICATIONS TO CHANGE
5
I will SWITCH the dose or number of times a day I take the medications listed below when I get home from the hospital:    Coumadin 5 mg oral tablet  -- 1 tab(s) by mouth once a day

## 2019-09-10 NOTE — DISCHARGE NOTE ADULT - NS AS ACTIVITY OBS
PT IRP Treatment               SUBJECTIVE: Subjective: Agrees to participate.  (09/10/19 1301)  Subjective/Objective Comments: awaiting transport at end of session.  (09/10/19 1301)    OBJECTIVE:  Precautions  Other Precautions: fall risk, LifeVest (09/10/19 0745)    See below for current functional status overview.  See PT flowsheet for full details regarding the PT therapy provided.    ASSESSMENT:   Patient progressed to trial of 3\" stairs this session. Ambulation distances limited to 130 feet due to shortness of breath. Overall patient with tendency for wide base of support and demonstrates decreased BLE graded control and coordination with functional mobility.           This patient participated in all scheduled physical therapy time with this therapist today.    EDUCATION:   On this date, education was provided to patient regarding  transfers, ambulation and stairs  The response to education was/were: Demonstrates understanding and Needs reinforcement    PLAN:   Continue skilled PT, including the following Treatment/Interventions: Functional transfer training;Strengthening;ROM;Equipment eval/education;Bed mobility;Gait training;Stairs retraining;Safety Education;Neuromuscular re-education (09/10/19 0745)   PT Frequency: 7 days/week (09/10/19 0745), Frequency Comments: modified program due to KD (09/10/19 0745)    Treatment Plan for Next Session: ambulation on ramp if appropriate, stair progression from 3\" stairs, gait with wheeled walker, activity tolerance progression  Additional Plan Considerations: IRF/FIM thru 9/12       RECOMMENDATIONS FOR DISCHARGE:       PT/OT Mobility Equipment for Discharge: continue to monitor, does not own  (09/10/19 0745)  PT/OT ADL Equipment for Discharge: To be further assessed. (09/10/19 0945)      FUNCTIONAL DATA OVERVIEW LAST 24 HOURS  Bed Mobility   Bed Mobility  Rolling to the Right: Supervision (Supv) (09/10/19 0745)  Rolling to the Left: Supervision (Supv) (09/10/19  0745)  Supine to Sit: Supervision (Supv) (09/10/19 0745)  Sit to Supine: Supervision (Supv) (09/10/19 0745)  Bed Mobility Comments: completes with head of bed elevated, use of railings, supervision for safety, increased time to complete  (09/10/19 0745)    Transfers  Transfers  Sit to Stand: Supervision (Supv) (09/10/19 1301)  Stand to Sit: Supervision (Supv) (09/10/19 1301)  Stand Pivot Transfers: Touching/Steadying Assistance (09/10/19 1301)  Car Transfers: Not attempted due to safety concerns(patient stature) (09/10/19 1301)  Assistive Device/: 2-wheeled walker (09/10/19 1301)  Transfer Comments 1: light steadying assist for safety and balance, increased UE reliance, decreased eccentric control, cues for WW alignment with stand pivot turns  (09/10/19 0745)    Gait  Gait  Gait Assistance: Touching/Steadying Assistance (09/10/19 1301)  Assistive Device/: 2-wheeled walker (09/10/19 1301)  Ambulation Distance (Feet): 130 Feet (09/10/19 1301)  IRP Gait: Yes, the patient is walking (09/10/19 0745)  Walk 10 feet: Touching/Steadying Assistance (09/10/19 1301)  Walk 50 feet with 2 turns: Touching/Steadying Assistance (09/10/19 1301)  Walk 150 feet: Touching/Steadying Assistance (09/10/19 1301)  Walk 10 feet on uneven or sloping surfaces: Not attempted due to safety concerns (09/10/19 1301)   small object from floor: Minimal Assist (Min) (09/10/19 0745)  Pattern: Shuffle (09/10/19 0745)  Ambulation Surface: Tile (09/10/19 0745)  Gait Comments 1: steadying assist for safety, wide base of support, standing rest breaks due to shortness of breath (09/10/19 1301),      Stairs  Stairs Mobility  Number of Stairs: 3\" stairs x 8 (09/10/19 1301)  IRP Stairs: Yes (09/10/19 1301)  1 step (curb): Touching/Steadying Assistance (09/10/19 1301)  4 steps: Touching/Steadying Assistance (09/10/19 1301)  12 steps: Touching/Steadying Assistance (09/10/19 1301)  Stair Management Technique: Two rails;Alternating  pattern (09/10/19 1301)  Stairs Mobility Comments: reciprocal sequencing, heavy reliance on BUE support, wide base of support, decreased coordination BLE with foot placement (09/10/19 1301)    Wheelchair Mobility       Balance  Balance  Sitting - Static: Independent (09/10/19 0745)  Sitting - Dynamic: Independent (09/10/19 0745)  Standing - Static: Minimal Assist (Min) (09/10/19 0745)  Standing - Dynamic (eyes open): Minimal Assist (Min) (09/10/19 0745)  Balance Comments #1: standing balance with BUE support on WW, increased UE reliance  (09/10/19 0745)    Neuromuscular Re-education       Other Therapeutic Interventions        No Heavy lifting/straining/Showering allowed

## 2019-10-10 NOTE — ED ADULT TRIAGE NOTE - PRO INTERPRETER NEED 2
Health Maintenance Due   Topic Date Due   • Shingles Vaccine (2 of 3) 06/04/2015   • Influenza Vaccine (1) 09/01/2019   • Lung Cancer Screening  11/09/2019       Patient declines flu shot.    [facility]

## 2019-10-14 NOTE — BEHAVIORAL HEALTH ASSESSMENT NOTE - NSBHLOC_PSY_A_CORE
Final Anesthesia Post-op Assessment    Patient: Linda Suero  Procedure(s) Performed: RAY RESECTION RIGHT RING FINGER - RIGHT  Anesthesia type: General    Vitals Value Taken Time   Temp 36.4 °C (97.5 °F) 10/14/2019  2:10 PM   Pulse 88 10/14/2019  2:15 PM   Resp 16 10/14/2019  2:15 PM   SpO2 91 % 10/14/2019  2:15 PM   /80 10/14/2019  2:15 PM       Last 24 I/O: No intake or output data in the 24 hours ending 10/14/19 3727      Patient Location: Phase II  Post-op Vital Signs:stable  Level of Consciousness: participates in exam, awake, oriented and alert  Respiratory Status: spontaneous ventilation  Cardiovascular stable  Hydration: euvolemic  Pain Management: well controlled  Nausea: None  Airway Patency:patent  Post-op Assessment: awake, alert, appropriately conversant, or baseline, no complications and patient tolerated procedure well with no complications       Alert

## 2020-04-17 NOTE — ED ADULT NURSE NOTE - NS ED NURSE RECORD ANOTHER HT AND WT
[facility] Care Coordination Contact    Submitted Auth to [employer] for HHA.    Analisa Dorsey  Care Management Specialist   [facility]   [phone]       No

## 2020-10-14 ENCOUNTER — APPOINTMENT (OUTPATIENT)
Dept: ORTHOPEDIC SURGERY | Facility: CLINIC | Age: 40
End: 2020-10-14

## 2020-10-16 ENCOUNTER — APPOINTMENT (OUTPATIENT)
Dept: ORTHOPEDIC SURGERY | Facility: CLINIC | Age: 40
End: 2020-10-16
Payer: COMMERCIAL

## 2020-10-16 VITALS
OXYGEN SATURATION: 98 % | WEIGHT: 176 LBS | DIASTOLIC BLOOD PRESSURE: 80 MMHG | HEART RATE: 73 BPM | SYSTOLIC BLOOD PRESSURE: 126 MMHG | BODY MASS INDEX: 35.48 KG/M2 | HEIGHT: 59 IN

## 2020-10-16 DIAGNOSIS — M17.0 BILATERAL PRIMARY OSTEOARTHRITIS OF KNEE: ICD-10-CM

## 2020-10-16 PROCEDURE — 99214 OFFICE O/P EST MOD 30 MIN: CPT | Mod: 25

## 2020-10-16 PROCEDURE — 20611 DRAIN/INJ JOINT/BURSA W/US: CPT | Mod: LT

## 2020-10-16 PROCEDURE — 73564 X-RAY EXAM KNEE 4 OR MORE: CPT | Mod: RT

## 2020-10-16 RX ORDER — WARFARIN 4 MG/1
TABLET ORAL
Refills: 0 | Status: ACTIVE | COMMUNITY

## 2020-10-16 RX ORDER — WARFARIN 10 MG/1
10 TABLET ORAL
Qty: 90 | Refills: 0 | Status: ACTIVE | COMMUNITY
Start: 2020-06-04

## 2020-10-16 RX ORDER — ATORVASTATIN CALCIUM 80 MG/1
80 TABLET, FILM COATED ORAL
Refills: 0 | Status: ACTIVE | COMMUNITY

## 2020-10-16 RX ORDER — DICLOFENAC SODIUM 20 MG/G
2 SOLUTION TOPICAL
Qty: 1 | Refills: 0 | Status: ACTIVE | COMMUNITY
Start: 2020-10-16 | End: 1900-01-01

## 2020-10-16 RX ORDER — ERGOCALCIFEROL 1.25 MG/1
1.25 MG CAPSULE, LIQUID FILLED ORAL
Qty: 13 | Refills: 0 | Status: ACTIVE | COMMUNITY
Start: 2020-06-04

## 2020-10-16 RX ORDER — ATORVASTATIN CALCIUM 80 MG/1
80 TABLET, FILM COATED ORAL
Qty: 30 | Refills: 0 | Status: ACTIVE | COMMUNITY
Start: 2020-09-04

## 2020-10-16 RX ORDER — WARFARIN 1 MG/1
1 TABLET ORAL
Qty: 90 | Refills: 0 | Status: ACTIVE | COMMUNITY
Start: 2020-06-04

## 2020-10-16 NOTE — HISTORY OF PRESENT ILLNESS
[de-identified] : CC:  BL knee pain\par \par HPI 40 year female  presents with acute on chronic onset of 3mo on 1yr of BL knee pain (L>R) in the L posterolateral and BL anterior knees WITHOUT INJURY. The pain is WORSE and rated a 8-10 out of 10 described as stabbing WITHOUT RADIATION. REST makes the pain better and standing up from a seated position makes the pain worse. The patient denies associated symptoms. The patient agrees to pain at night affecting sleep, and denies similar pain previously.\par  \par The patient has tried the following treatments:\par Activity modification	-\par Ice/Compression  	-\par Braces    		-\par Nsaids    		- \par Physical Therapy 		-\par Cortisone Injection	-\par Visco Injection		-\par Zilretta			-\par Arthroscopy		-\par \par Review of Systems is positive for the above musculoskeletal symptoms and is otherwise non-contributory for general, constitutional, psychiatric, neurologic, HEENT, cardiac, respiratory, gastrointestinal, reproductive, lymphatic, and dermatologic complaints.\par  \par Consult by  \susan \par

## 2020-10-16 NOTE — DISCUSSION/SUMMARY
[de-identified] : bl knee oa\par \par The patient and I discussed the causes and progression of degenerative joint disease of the knee. Models, diagrams and drawings were used in the discussion. Treatment can include conservative non-operative management and surgical options. Conservative management includes weight loss, activity modification, physical therapy to improve motion and strength in the muscles around the knee and the body's core, PO and topical NSAIDs, corticosteroid and/or viscosupplementation intra-articular injections. If the patient fails to improve with non-operative management, surgical management is possible. Depending upon the patient's age, BMI, activity level, degree and location of arthrosis different surgical options are possible including arthroscopic debridement with chondroplasty, high-tibial osteotomy, unicondylar/partial arthroplasty, and total joint arthroplasty.\par  \par Physical therapy was prescribed for knee ROM exercises, strengthening exercises, deep tissue massage, core strengthening, hip abductor strengthening, VMO strengthening, modalities PRN, and home exercises\par  \par The patient was prescribed Diclofenac topical liquid/creme non-steroidal anti-inflammatory medication. 1-2 pumps twice daily and apply to area with pain. There is low systemic absorption of the medication but risks while reduced remain were discussed and include but not limited to renal damage and GI ulceration and bleeding. They were warned to stop the medication if worsening burning skin or gastric pain or dizziness or other side effects. Also to immediately stop the medication and seek appropriate medical attention if any severe stomach ache, gastritis, black/red vomit, black/red stools or any other medical concern.\par  \par  \par Procedure Note:\par  \par Verbal consent was obtained for an arthrocentesis and intra-articular corticosteroid injection of the LEFT and RIGHT knee, after the risks and benefits were discussed with the patient. Potential adverse effects were discussed including but not limited to bleeding, skin/joint infection, local skin reactions including bleaching, bruising, stiffness, soreness, vasovagal episodes, transient hyperglycemia, avascular necrosis, pseudo-septic type reactions, post injection joint pain, allergic reaction to product or anesthetic and other rare but potential adverse effects along with benefits including decreased pain and improved stability prior to obtaining verbal informed consent. It was also discussed that for some patients the treatment is ineffective and there are no guarantees that the patient will experience improvement as the result of the injection. In rare occasions the injection can cause worsening of pain.\par  \par The use of a Sonosite 15-6 MHz linear transducer with live ultrasound guidance of the knee was necessary given the patient's BMI and local body habitus overlying and obscuring the accurate identification of normal body bony anatomy used to identify the injection site and the depth of soft tissue envelope necessitating a longer than normal needle to reach the joint space, and to confirm the location of the needle tip and intra-articular delivery of the medication. Without the use of live ultrasound guidance the injection would have been more difficult and place the patient's neurovascular structures at risk from the longer needle needed to traverse the soft tissue envelope.\par  \par A 6 inch bolster was placed underneath the BILATERAL knee to place the knee in a slightly flexed position.\par  \par The LEFT knee superolateral injection site was identified using the ultrasound probe to identify the suprapatellar space and superior boarder of the patella first longitudinally and then transversely. The injection site was marked and prepped with a ChloraPrep swab and anesthetized with ethylchloride skin anesthesia. Using sterile technique a 20g 3 1/2 in spinal needle with 3 cc total of 1cc 1% lidocaine without epinephrine, 1cc 0.25% Marcaine without epinephrine and 1cc of 40mg/mL Kenalog was passed through the injection site towards the suprapatellar space under live ultrasound guidance and noted to penetrate the joint capsule. The medication was injected without resistance under live ultrasound visualization and noted to flow into the suprapatellar joint space. The injection site was sterilely dressed, there was minimal blood loss.\par  \par The RIGHT knee superolateral injection site was identified using the ultrasound probe to identify the suprapatellar space and superior boarder of the patella first longitudinally and then transversely. The injection site was marked and prepped with a ChloraPrep swab and anesthetized with ethylchloride skin anesthesia. Using sterile technique a 20g 3 1/2 in spinal needle with 3 cc total of 1cc 1% lidocaine without epinephrine, 1cc 0.25% Marcaine without epinephrine and 1cc of 40mg/mL Kenalog was passed through the injection site towards the suprapatellar space under live ultrasound guidance and noted to penetrate the joint capsule. The medication was injected without resistance under live ultrasound visualization and noted to flow into the suprapatellar joint space. The injection site was sterilely dressed, there was minimal blood loss.\par  \par The patient tolerated this procedure without any complications done by myself. Images were recorded and saved.\par  \par The patient has been advised that if they notice any worsening of symptoms or any problems to contact me and seek care from a qualified medical professional. The patient was instructed to ice the knee and take NSAID medication on an as needed basis if the patient feels discomfort.\par \par The patient verifies their understanding the the visit, diagnosis and plan. They agree with the treatment plan and will contact the office with any questions or problems.\par \par Follow up\par PRN

## 2020-10-16 NOTE — PHYSICAL EXAM
[de-identified] : Physical Examination\par General: well nourished, in no acute distress, alert and oriented to person, place and time\par Psychiatric: normal mood and affect, no abnormal movements or speech patterns\par Eyes: vision intact without glasses\par Throat: no thyromegaly\par Lymph: no enlarged nodes, no lymphedema in extremity\par Respiratory: no wheezing, no shortness of breath with ambulation\par Cardiac: no cardiac leg swelling, 2+ peripheral pulses\par Neurology: normal gross sensation in extremities to light touch\par Abdomen: soft, non-tender, tympanic, no masses\par  \par Musculoskeletal Examination\par Ambulation           - antalgic gait, - assistive devices\par  \par Knee			Right			Left\par General\par      Swelling/Deformity	normal			normal	\par      Skin			normal			normal\par      Erythema		-			-\par      Standing Alignment	varus			varus\par      Effusion		trace			trace\par Range of Motion\par      Hip			full painless ROM		full painless ROM\par      Knee Flexion		100			100\par      Knee Extension	0			0\par Patella\par      J Sign		-			-\par      Quad Medial/Lateral	1/1 1/1\par      Apprehension		-			-\par      Scotty's		-			-\par      Grind Sign		-			-\par      Crepitus		-			-\par Palpation\par      Medial Joint Line	+			+\par      Medial Fem Condyle	-			-\par      Lateral Joint Line	-			-\par      Quad Tendon		-			-\par      Patella Tendon	-			-\par      Medial Patella		-			-\par      Lateral Patella 	-			-\par      Posterior Knee	-			+\par Ligamentous\par      Varus @ 0° / 30°	-/-			-/-\par      Valgus @ 0° / 30°	-/-			-/-\par      Lachman		-			-\par      Pivot Shift		-			-\par      Anterior Drawer	-			-\par      Posterior Drawer	-			-\par Meniscus\par      Anne		-			-\par      Flexion Pinch		-			-\par Strength Examination/Atrophy\par      Hip Flexors 		5+			5+\par      Quadriceps		5+			5+\par      Hamstring		5+			5+\par      Tibialis Anterior	5+			5+\par      Achilles/Soleus	5+			5+\par Sensation\par      Deep Peroneal	normal			normal\par      Superficial Peroneal 	normal			normal\par      Sural  		normal			normal\par      Posterior Tibial 	normal			normal\par      Saphneous 		normal			normal\par Pulses\par      DP			2+			2+\par \par  [de-identified] : 4 views of the affected bilateral knee (standing AP, flexing standing AP, 30degree flexed lateral, sunrise view)\par were ordered, obtained and evaluated by myself today and\par demonstrate:\par  \par RIGHT\par There is mild Medial Asymmetric narrowing on flex knee and standing\par Small osteophytic lipping\par trace suprapatellar effusion\par mild lateral patellofemoral joint space loss without evidence of tilt or subluxation on sunrise view\par Normal soft tissue density\par Otherwise normal osseous bone structure without fracture or dislocation\par  \par LEFT\par There is mild medial asymmetric narrowing on flex knee and standing\par small osteophytic lipping\par trace suprapatellar effusion\par mild lateral patellofemoral joint space loss without evidence of tilt or subluxation on sunrise view\par Normal soft tissue density\par Otherwise normal osseous bone structure without fracture or dislocation\par

## 2020-11-16 ENCOUNTER — RESULT REVIEW (OUTPATIENT)
Age: 40
End: 2020-11-16

## 2021-03-16 NOTE — ED ADULT TRIAGE NOTE - RESPIRATORY RATE (BREATHS/MIN)
18
Detail Level: Simple
Additional Notes: Patient consent was obtained to proceed with the visit and recommended plan of care after discussion of all risks and benefits, including the risks of COVID-19 exposure.

## 2021-04-01 ENCOUNTER — APPOINTMENT (OUTPATIENT)
Dept: PULMONOLOGY | Facility: CLINIC | Age: 41
End: 2021-04-01

## 2021-04-20 ENCOUNTER — APPOINTMENT (OUTPATIENT)
Dept: PULMONOLOGY | Facility: CLINIC | Age: 41
End: 2021-04-20

## 2021-10-01 NOTE — ED ADULT NURSE NOTE - NS ED NOTE ABUSE RESPONSE YN
Initial Anesthesia Post-op Note    Patient: Gilberto Navarro  Procedure(s) Performed: CHOLECYSTECTOMY, LAPAROSCOPIC WITH GRAMS  Anesthesia type: General    Vitals Value Taken Time   Temp 36.8 10/01/21 1041   Pulse 107 10/01/21 1041   Resp 20 10/01/21 1041   SpO2 94 10/01/21 1041    69 10/01/21 1041         Patient Location: PACU Phase 1  Post-op Vital Signs:stable  Level of Consciousness: responds to stimulation  Respiratory Status: oral airway, face mask and spontaneous ventilation  Cardiovascular stable  Hydration: euvolemic  Pain Management: well controlled  Handoff: Handoff to receiving clinician was performed and questions were answered  Vomiting: none  Nausea: None  Airway Patency:patent  Post-op Assessment: no complications, patient tolerated procedure well with no complications and no evidence of recall      No complications documented.   Yes

## 2022-04-08 NOTE — ED ADULT NURSE REASSESSMENT NOTE - COMFORT CARE
Chinmay Greenwood is a 65 y.o.  male patient, who presents for a 6 minute walk test ordered by MD Ricardo.  The diagnosis is Hypersensitivity Pneumonitis.  The patient's BMI is 33 kg/m2.  Predicted distance (lower limit of normal) is 364.65 meters.      Test Results:    The test was completed without stopping.  The total time walked was 360 seconds.  During walking, the patient reported:  Dyspnea.  The patient used supplemental oxygen during testing.     04/07/2022---------Distance: 426.72 meters (1400 feet)     O2 Sat % Supplemental Oxygen Heart Rate Blood Pressure Yahaira Scale   Pre-exercise  (Resting) 99 % 4 L/M 85 bpm 136/72 mmHg 0   During Exercise 81 % 4 L/M 125 bpm 157/78 mmHg 9   Post-exercise  (Recovery) 98 % 4 L/M  70 bpm 154/74 mmHg      Recovery Time: 127 seconds    Performing nurse/tech: Hortensia HARRIS      PREVIOUS STUDY:   10/05/2021---------Distance: 318 meters (1250 feet)       O2 Sat % Supplemental Oxygen Heart Rate Blood Pressure Yahaira Scale   Pre-exercise  (Resting) 96 % 4 L/M 93 bpm 130/79 mmHg 2   During Exercise 89 % 4 L/M 121 bpm 141/76 mmHg 7-8   Post-exercise  (Recovery) 97 % 4 L/M  101 bpm   mmHg        CLINICAL INTERPRETATION:  Six minute walk distance is 426.72 meters (1400 feet) with very, very heavy dyspnea.  During exercise, there was significant desaturation while breathing supplemental oxygen.  Both blood pressure and heart rate increased significantly with walking.  The patient did not report non-pulmonary symptoms during exercise.  Since the previous study in October 2021, exercise capacity is significantly improved.  Based upon age and body mass index, exercise capacity is normal.  
warm blanket provided

## 2022-06-30 ENCOUNTER — APPOINTMENT (OUTPATIENT)
Dept: PULMONOLOGY | Facility: CLINIC | Age: 42
End: 2022-06-30

## 2022-07-06 NOTE — ED PROVIDER NOTE - INTERPRETATION
Noted. See scanned fax for response.
Received fax from [phone] [facility], see attached scan
normal

## 2022-09-30 NOTE — ED ADULT NURSE NOTE - RESPIRATORY ASSESSMENT
Thalidomide Pregnancy And Lactation Text: This medication is Pregnancy Category X and is absolutely contraindicated during pregnancy. It is unknown if it is excreted in breast milk. WDL

## 2022-11-17 NOTE — DISCHARGE NOTE ADULT - NS TRANSFER PATIENT BELONGINGS
Ara, mom wondering :Should she take the pills like normal including the placebo week of pills or should it be a straight 6 weeks on the pill?     Clothing//Cell Phone/PDA (specify)

## 2023-12-31 ENCOUNTER — EMERGENCY (EMERGENCY)
Facility: HOSPITAL | Age: 43
LOS: 0 days | Discharge: HOME | End: 2023-12-31
Attending: STUDENT IN AN ORGANIZED HEALTH CARE EDUCATION/TRAINING PROGRAM
Payer: COMMERCIAL

## 2023-12-31 VITALS
RESPIRATION RATE: 16 BRPM | SYSTOLIC BLOOD PRESSURE: 141 MMHG | OXYGEN SATURATION: 97 % | DIASTOLIC BLOOD PRESSURE: 84 MMHG | TEMPERATURE: 98 F | HEART RATE: 72 BPM

## 2023-12-31 VITALS
DIASTOLIC BLOOD PRESSURE: 83 MMHG | HEIGHT: 63 IN | RESPIRATION RATE: 18 BRPM | TEMPERATURE: 98 F | WEIGHT: 279.99 LBS | HEART RATE: 76 BPM | OXYGEN SATURATION: 92 % | SYSTOLIC BLOOD PRESSURE: 128 MMHG

## 2023-12-31 DIAGNOSIS — N39.0 URINARY TRACT INFECTION, SITE NOT SPECIFIED: ICD-10-CM
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
## 2023-12-31 DIAGNOSIS — Z88.8 ALLERGY STATUS TO OTHER DRUGS, MEDICAMENTS AND BIOLOGICAL SUBSTANCES: ICD-10-CM

## 2023-12-31 DIAGNOSIS — M79.661 PAIN IN RIGHT LOWER LEG: ICD-10-CM
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
## 2023-12-31 DIAGNOSIS — R35.0 FREQUENCY OF MICTURITION: ICD-10-CM

## 2023-12-31 DIAGNOSIS — Z95.2 PRESENCE OF PROSTHETIC HEART VALVE: Chronic | ICD-10-CM

## 2023-12-31 DIAGNOSIS — Z79.01 LONG TERM (CURRENT) USE OF ANTICOAGULANTS: ICD-10-CM

## 2023-12-31 DIAGNOSIS — R31.9 HEMATURIA, UNSPECIFIED: ICD-10-CM

## 2023-12-31 DIAGNOSIS — Z86.79 PERSONAL HISTORY OF OTHER DISEASES OF THE CIRCULATORY SYSTEM: ICD-10-CM

## 2023-12-31 DIAGNOSIS — Z98.891 HISTORY OF UTERINE SCAR FROM PREVIOUS SURGERY: Chronic | ICD-10-CM

## 2023-12-31 LAB
ALBUMIN SERPL ELPH-MCNC: 3.3 G/DL — SIGNIFICANT CHANGE UP (ref 3.3–5)
ALBUMIN SERPL ELPH-MCNC: 3.3 G/DL — SIGNIFICANT CHANGE UP (ref 3.3–5)
ALP SERPL-CCNC: 71 U/L — SIGNIFICANT CHANGE UP (ref 40–120)
ALP SERPL-CCNC: 71 U/L — SIGNIFICANT CHANGE UP (ref 40–120)
ALT FLD-CCNC: 31 U/L — SIGNIFICANT CHANGE UP (ref 12–78)
ALT FLD-CCNC: 31 U/L — SIGNIFICANT CHANGE UP (ref 12–78)
ANION GAP SERPL CALC-SCNC: 5 MMOL/L — SIGNIFICANT CHANGE UP (ref 5–17)
ANION GAP SERPL CALC-SCNC: 5 MMOL/L — SIGNIFICANT CHANGE UP (ref 5–17)
APPEARANCE UR: CLEAR — SIGNIFICANT CHANGE UP
APPEARANCE UR: CLEAR — SIGNIFICANT CHANGE UP
AST SERPL-CCNC: 27 U/L — SIGNIFICANT CHANGE UP (ref 15–37)
AST SERPL-CCNC: 27 U/L — SIGNIFICANT CHANGE UP (ref 15–37)
BACTERIA # UR AUTO: ABNORMAL /HPF
BACTERIA # UR AUTO: ABNORMAL /HPF
BASOPHILS # BLD AUTO: 0.02 K/UL — SIGNIFICANT CHANGE UP (ref 0–0.2)
BASOPHILS # BLD AUTO: 0.02 K/UL — SIGNIFICANT CHANGE UP (ref 0–0.2)
BASOPHILS NFR BLD AUTO: 0.3 % — SIGNIFICANT CHANGE UP (ref 0–2)
BASOPHILS NFR BLD AUTO: 0.3 % — SIGNIFICANT CHANGE UP (ref 0–2)
BILIRUB SERPL-MCNC: 0.5 MG/DL — SIGNIFICANT CHANGE UP (ref 0.2–1.2)
BILIRUB SERPL-MCNC: 0.5 MG/DL — SIGNIFICANT CHANGE UP (ref 0.2–1.2)
BILIRUB UR-MCNC: NEGATIVE — SIGNIFICANT CHANGE UP
BILIRUB UR-MCNC: NEGATIVE — SIGNIFICANT CHANGE UP
BUN SERPL-MCNC: 13 MG/DL — SIGNIFICANT CHANGE UP (ref 7–23)
BUN SERPL-MCNC: 13 MG/DL — SIGNIFICANT CHANGE UP (ref 7–23)
CALCIUM SERPL-MCNC: 8.6 MG/DL — SIGNIFICANT CHANGE UP (ref 8.5–10.1)
CALCIUM SERPL-MCNC: 8.6 MG/DL — SIGNIFICANT CHANGE UP (ref 8.5–10.1)
CHLORIDE SERPL-SCNC: 111 MMOL/L — HIGH (ref 96–108)
CHLORIDE SERPL-SCNC: 111 MMOL/L — HIGH (ref 96–108)
CO2 SERPL-SCNC: 23 MMOL/L — SIGNIFICANT CHANGE UP (ref 22–31)
CO2 SERPL-SCNC: 23 MMOL/L — SIGNIFICANT CHANGE UP (ref 22–31)
COLOR SPEC: YELLOW — SIGNIFICANT CHANGE UP
COLOR SPEC: YELLOW — SIGNIFICANT CHANGE UP
CREAT SERPL-MCNC: 0.59 MG/DL — SIGNIFICANT CHANGE UP (ref 0.5–1.3)
CREAT SERPL-MCNC: 0.59 MG/DL — SIGNIFICANT CHANGE UP (ref 0.5–1.3)
DIFF PNL FLD: ABNORMAL
DIFF PNL FLD: ABNORMAL
EGFR: 116 ML/MIN/1.73M2 — SIGNIFICANT CHANGE UP
EGFR: 116 ML/MIN/1.73M2 — SIGNIFICANT CHANGE UP
EOSINOPHIL # BLD AUTO: 0.04 K/UL — SIGNIFICANT CHANGE UP (ref 0–0.5)
EOSINOPHIL # BLD AUTO: 0.04 K/UL — SIGNIFICANT CHANGE UP (ref 0–0.5)
EOSINOPHIL NFR BLD AUTO: 0.6 % — SIGNIFICANT CHANGE UP (ref 0–6)
EOSINOPHIL NFR BLD AUTO: 0.6 % — SIGNIFICANT CHANGE UP (ref 0–6)
EPI CELLS # UR: PRESENT
EPI CELLS # UR: PRESENT
GLUCOSE SERPL-MCNC: 92 MG/DL — SIGNIFICANT CHANGE UP (ref 70–99)
GLUCOSE SERPL-MCNC: 92 MG/DL — SIGNIFICANT CHANGE UP (ref 70–99)
GLUCOSE UR QL: NEGATIVE MG/DL — SIGNIFICANT CHANGE UP
GLUCOSE UR QL: NEGATIVE MG/DL — SIGNIFICANT CHANGE UP
HCT VFR BLD CALC: 39.6 % — SIGNIFICANT CHANGE UP (ref 34.5–45)
HCT VFR BLD CALC: 39.6 % — SIGNIFICANT CHANGE UP (ref 34.5–45)
HGB BLD-MCNC: 12.6 G/DL — SIGNIFICANT CHANGE UP (ref 11.5–15.5)
HGB BLD-MCNC: 12.6 G/DL — SIGNIFICANT CHANGE UP (ref 11.5–15.5)
IMM GRANULOCYTES NFR BLD AUTO: 0.3 % — SIGNIFICANT CHANGE UP (ref 0–0.9)
IMM GRANULOCYTES NFR BLD AUTO: 0.3 % — SIGNIFICANT CHANGE UP (ref 0–0.9)
KETONES UR-MCNC: NEGATIVE MG/DL — SIGNIFICANT CHANGE UP
KETONES UR-MCNC: NEGATIVE MG/DL — SIGNIFICANT CHANGE UP
LEUKOCYTE ESTERASE UR-ACNC: ABNORMAL
LEUKOCYTE ESTERASE UR-ACNC: ABNORMAL
LYMPHOCYTES # BLD AUTO: 2.42 K/UL — SIGNIFICANT CHANGE UP (ref 1–3.3)
LYMPHOCYTES # BLD AUTO: 2.42 K/UL — SIGNIFICANT CHANGE UP (ref 1–3.3)
LYMPHOCYTES # BLD AUTO: 38.2 % — SIGNIFICANT CHANGE UP (ref 13–44)
LYMPHOCYTES # BLD AUTO: 38.2 % — SIGNIFICANT CHANGE UP (ref 13–44)
MCHC RBC-ENTMCNC: 25.7 PG — LOW (ref 27–34)
MCHC RBC-ENTMCNC: 25.7 PG — LOW (ref 27–34)
MCHC RBC-ENTMCNC: 31.8 G/DL — LOW (ref 32–36)
MCHC RBC-ENTMCNC: 31.8 G/DL — LOW (ref 32–36)
MCV RBC AUTO: 80.7 FL — SIGNIFICANT CHANGE UP (ref 80–100)
MCV RBC AUTO: 80.7 FL — SIGNIFICANT CHANGE UP (ref 80–100)
MONOCYTES # BLD AUTO: 0.34 K/UL — SIGNIFICANT CHANGE UP (ref 0–0.9)
MONOCYTES # BLD AUTO: 0.34 K/UL — SIGNIFICANT CHANGE UP (ref 0–0.9)
MONOCYTES NFR BLD AUTO: 5.4 % — SIGNIFICANT CHANGE UP (ref 2–14)
MONOCYTES NFR BLD AUTO: 5.4 % — SIGNIFICANT CHANGE UP (ref 2–14)
NEUTROPHILS # BLD AUTO: 3.5 K/UL — SIGNIFICANT CHANGE UP (ref 1.8–7.4)
NEUTROPHILS # BLD AUTO: 3.5 K/UL — SIGNIFICANT CHANGE UP (ref 1.8–7.4)
NEUTROPHILS NFR BLD AUTO: 55.2 % — SIGNIFICANT CHANGE UP (ref 43–77)
NEUTROPHILS NFR BLD AUTO: 55.2 % — SIGNIFICANT CHANGE UP (ref 43–77)
NITRITE UR-MCNC: POSITIVE
NITRITE UR-MCNC: POSITIVE
NRBC # BLD: 0 /100 WBCS — SIGNIFICANT CHANGE UP (ref 0–0)
NRBC # BLD: 0 /100 WBCS — SIGNIFICANT CHANGE UP (ref 0–0)
PH UR: 6 — SIGNIFICANT CHANGE UP (ref 5–8)
PH UR: 6 — SIGNIFICANT CHANGE UP (ref 5–8)
PLATELET # BLD AUTO: 237 K/UL — SIGNIFICANT CHANGE UP (ref 150–400)
PLATELET # BLD AUTO: 237 K/UL — SIGNIFICANT CHANGE UP (ref 150–400)
POTASSIUM SERPL-MCNC: 4.2 MMOL/L — SIGNIFICANT CHANGE UP (ref 3.5–5.3)
POTASSIUM SERPL-MCNC: 4.2 MMOL/L — SIGNIFICANT CHANGE UP (ref 3.5–5.3)
POTASSIUM SERPL-SCNC: 4.2 MMOL/L — SIGNIFICANT CHANGE UP (ref 3.5–5.3)
POTASSIUM SERPL-SCNC: 4.2 MMOL/L — SIGNIFICANT CHANGE UP (ref 3.5–5.3)
PROT SERPL-MCNC: 7.2 GM/DL — SIGNIFICANT CHANGE UP (ref 6–8.3)
PROT SERPL-MCNC: 7.2 GM/DL — SIGNIFICANT CHANGE UP (ref 6–8.3)
PROT UR-MCNC: NEGATIVE MG/DL — SIGNIFICANT CHANGE UP
PROT UR-MCNC: NEGATIVE MG/DL — SIGNIFICANT CHANGE UP
RBC # BLD: 4.91 M/UL — SIGNIFICANT CHANGE UP (ref 3.8–5.2)
RBC # BLD: 4.91 M/UL — SIGNIFICANT CHANGE UP (ref 3.8–5.2)
RBC # FLD: 14.3 % — SIGNIFICANT CHANGE UP (ref 10.3–14.5)
RBC # FLD: 14.3 % — SIGNIFICANT CHANGE UP (ref 10.3–14.5)
RBC CASTS # UR COMP ASSIST: SIGNIFICANT CHANGE UP /HPF (ref 0–4)
RBC CASTS # UR COMP ASSIST: SIGNIFICANT CHANGE UP /HPF (ref 0–4)
SODIUM SERPL-SCNC: 139 MMOL/L — SIGNIFICANT CHANGE UP (ref 135–145)
SODIUM SERPL-SCNC: 139 MMOL/L — SIGNIFICANT CHANGE UP (ref 135–145)
SP GR SPEC: 1.01 — SIGNIFICANT CHANGE UP (ref 1–1.03)
SP GR SPEC: 1.01 — SIGNIFICANT CHANGE UP (ref 1–1.03)
UROBILINOGEN FLD QL: 0.2 MG/DL — SIGNIFICANT CHANGE UP (ref 0.2–1)
UROBILINOGEN FLD QL: 0.2 MG/DL — SIGNIFICANT CHANGE UP (ref 0.2–1)
WBC # BLD: 6.34 K/UL — SIGNIFICANT CHANGE UP (ref 3.8–10.5)
WBC # BLD: 6.34 K/UL — SIGNIFICANT CHANGE UP (ref 3.8–10.5)
WBC # FLD AUTO: 6.34 K/UL — SIGNIFICANT CHANGE UP (ref 3.8–10.5)
WBC # FLD AUTO: 6.34 K/UL — SIGNIFICANT CHANGE UP (ref 3.8–10.5)
WBC UR QL: SIGNIFICANT CHANGE UP /HPF (ref 0–5)
WBC UR QL: SIGNIFICANT CHANGE UP /HPF (ref 0–5)

## 2023-12-31 PROCEDURE — 73590 X-RAY EXAM OF LOWER LEG: CPT | Mod: 26,RT

## 2023-12-31 PROCEDURE — 73562 X-RAY EXAM OF KNEE 3: CPT | Mod: 26,RT

## 2023-12-31 PROCEDURE — 70450 CT HEAD/BRAIN W/O DYE: CPT | Mod: 26,MA

## 2023-12-31 PROCEDURE — 99284 EMERGENCY DEPT VISIT MOD MDM: CPT

## 2023-12-31 RX ORDER — NITROFURANTOIN MACROCRYSTAL 50 MG
100 CAPSULE ORAL ONCE
Refills: 0 | Status: COMPLETED | OUTPATIENT
Start: 2023-12-31 | End: 2023-12-31

## 2023-12-31 RX ORDER — NITROFURANTOIN MACROCRYSTAL 50 MG
1 CAPSULE ORAL
Qty: 10 | Refills: 0
Start: 2023-12-31 | End: 2024-01-04

## 2023-12-31 RX ORDER — FOLIC ACID 0.8 MG
1 TABLET ORAL
Qty: 0 | Refills: 0 | DISCHARGE

## 2023-12-31 RX ORDER — ATORVASTATIN CALCIUM 80 MG/1
1 TABLET, FILM COATED ORAL
Qty: 0 | Refills: 0 | DISCHARGE

## 2023-12-31 RX ORDER — OMEGA-3 ACID ETHYL ESTERS 1 G
0 CAPSULE ORAL
Qty: 0 | Refills: 0 | DISCHARGE

## 2023-12-31 RX ORDER — GABAPENTIN 400 MG/1
1 CAPSULE ORAL
Qty: 0 | Refills: 0 | DISCHARGE

## 2023-12-31 RX ADMIN — Medication 100 MILLIGRAM(S): at 15:34

## 2023-12-31 NOTE — ED ADULT NURSE NOTE - NSFALLRISKINTERV_ED_ALL_ED
Assistance OOB with selected safe patient handling equipment if applicable/Assistance with ambulation/Communicate fall risk and risk factors to all staff, patient, and family/Monitor gait and stability/Provide visual cue: yellow wristband, yellow gown, etc/Reinforce activity limits and safety measures with patient and family/Call bell, personal items and telephone in reach/Instruct patient to call for assistance before getting out of bed/chair/stretcher/Non-slip footwear applied when patient is off stretcher/Secretary to call system/Physically safe environment - no spills, clutter or unnecessary equipment/Purposeful Proactive Rounding/Room/bathroom lighting operational, light cord in reach Assistance OOB with selected safe patient handling equipment if applicable/Assistance with ambulation/Communicate fall risk and risk factors to all staff, patient, and family/Monitor gait and stability/Provide visual cue: yellow wristband, yellow gown, etc/Reinforce activity limits and safety measures with patient and family/Call bell, personal items and telephone in reach/Instruct patient to call for assistance before getting out of bed/chair/stretcher/Non-slip footwear applied when patient is off stretcher/Paola to call system/Physically safe environment - no spills, clutter or unnecessary equipment/Purposeful Proactive Rounding/Room/bathroom lighting operational, light cord in reach

## 2023-12-31 NOTE — ED ADULT NURSE NOTE - NSICDXFAMILYHX_GEN_ALL_CORE_FT
FAMILY HISTORY:  Family history of early CAD    Sibling  Still living? Yes, Estimated age: Age Unknown  Family history of valvular heart disease, Age at diagnosis: Age Unknown

## 2023-12-31 NOTE — ED ADULT NURSE REASSESSMENT NOTE - NS ED NURSE REASSESS COMMENT FT1
PT AOx4, resting comfortably in stretcher. Pt denies pregnancy, Dr. Jauregui made aware. As per Dr. Jauregui, may proceed with administration of Macrobid. Plan of care to continue.

## 2023-12-31 NOTE — ED PROVIDER NOTE - NS ED ROS FT
CONST: no fevers, no chills  EYES: no pain, no vision changes  ENT: no sore throat, no ear pain, no change in hearing  CV: no chest pain, no leg swelling  RESP: no shortness of breath, no cough  ABD: no abdominal pain, no nausea, no vomiting, no diarrhea  : no dysuria, no flank pain, + hematuria  MSK: no back pain, + extremity pain  NEURO: no headache or additional neurologic complaints  HEME: no easy bleeding  SKIN:  no rash

## 2023-12-31 NOTE — ED PROVIDER NOTE - NSICDXPASTSURGICALHX_GEN_ALL_CORE_FT
PAST SURGICAL HISTORY:  S/P      S/P MVR (mitral valve replacement) Mechanical MVR at [facility] on 17     PAST SURGICAL HISTORY:  S/P      S/P MVR (mitral valve replacement) Mechanical MVR at [facility] on 17

## 2023-12-31 NOTE — ED ADULT NURSE NOTE - OBJECTIVE STATEMENT
patient alert and oriented x4. patient 44 yo female c/o fall. states she fell today in the house, denies head injury. as per family, patient has had multiple falls due to tripping. patient c/o right lower leg pain. denies LOC.

## 2023-12-31 NOTE — ED PROVIDER NOTE - PHYSICAL EXAMINATION
GENERAL: Awake, alert, NAD  HEENT: NC/AT, moist mucous membranes  LUNGS: CTAB, no wheezes or crackles   CARDIAC: RRR, no m/r/g  ABDOMEN: Soft, non tender, non distended, no rebound, no guarding  EXT: No edema, no calf tenderness, no deformities. +mild tenderness overlying distal tib/fib  NEURO: A&Ox3. Moving all extremities.  SKIN: Warm and dry. No rash.  PSYCH: Normal affect.

## 2023-12-31 NOTE — ED PROVIDER NOTE - PATIENT PORTAL LINK FT
You can access the FollowMyHealth Patient Portal offered by [facility] by registering at the following website: [email or web address]. By joining [employer]’s FollowMyHealth portal, you will also be able to view your health information using other applications (apps) compatible with our system. You can access the FollowMyHealth Patient Portal offered by [facility] by registering at the following website: [email or web address]. By joining [employer]’s FollowMyHealth portal, you will also be able to view your health information using other applications (apps) compatible with our system.

## 2023-12-31 NOTE — ED PROVIDER NOTE - CLINICAL SUMMARY MEDICAL DECISION MAKING FREE TEXT BOX
42 y/o F with PMH MVR on coumadin presenting to the ED s/p fall.   Will obtain XR of RLE given that is where patient endorses pain. FULL ROM on exam.  WIll obtain head CT given "frequent random falls" to r/o central process  Will check labs and urine given frequency--likely UTI  Reassess    Labs significant for UTI  XR WNL  CTH negative  Will treat w/ macrobid 44 y/o F with PMH MVR on coumadin presenting to the ED s/p fall.   Will obtain XR of RLE given that is where patient endorses pain. FULL ROM on exam.  WIll obtain head CT given "frequent random falls" to r/o central process  Will check labs and urine given frequency--likely UTI  Reassess    Labs significant for UTI  XR WNL  CTH negative  Will treat w/ macrobid

## 2023-12-31 NOTE — ED PROVIDER NOTE - NSICDXPASTMEDICALHX_GEN_ALL_CORE_FT
PAST MEDICAL HISTORY:  HLD (hyperlipidemia)     HTN (hypertension)     MR (mitral regurgitation) S/P mechanical MVR on 6/7/17 at [facility]    MVP (mitral valve prolapse)     Osteomyelitis of arm Right arm osteomyelitis, treated in [facility] in 1998     PAST MEDICAL HISTORY:  HLD (hyperlipidemia)     HTN (hypertension)     MR (mitral regurgitation) S/P mechanical MVR on 6/7/17 at [facility]    MVP (mitral valve prolapse)     Osteomyelitis of arm Right arm osteomyelitis, treated in [facility] in 1998

## 2023-12-31 NOTE — ED ADULT NURSE NOTE - NSICDXPASTMEDICALHX_GEN_ALL_CORE_FT
PAST MEDICAL HISTORY:  HLD (hyperlipidemia)     HTN (hypertension)     MR (mitral regurgitation) S/P mechanical MVR on 6/7/17 at [facility]    MVP (mitral valve prolapse)     Osteomyelitis of arm Right arm osteomyelitis, treated in [facility] in 1998     PAST MEDICAL HISTORY:  HLD (hyperlipidemia)     HTN (hypertension)     MR (mitral regurgitation) S/P mechanical MVR on 6/7/17 at [facility]    MVP (mitral valve prolapse)     Osteomyelitis of arm Right arm osteomyelitis, treated in [facility] in 1998

## 2023-12-31 NOTE — ED ADULT TRIAGE NOTE - CHIEF COMPLAINT QUOTE
Patient BIB EMS for R hip pain post fall today, denies any LOC or head trauma. Patient on  blood thinner coumadin. PMx: CAD, HTN, Hyperlipidemia

## 2023-12-31 NOTE — ED ADULT NURSE NOTE - NSICDXPASTSURGICALHX_GEN_ALL_CORE_FT
PAST SURGICAL HISTORY:  S/P      S/P MVR (mitral valve replacement) Mechanical MVR at [facility] on 17     PAST SURGICAL HISTORY:  S/P      S/P MVR (mitral valve replacement) Mechanical MVR at [facility] on 17

## 2024-01-04 LAB
-  AMOXICILLIN/CLAVULANIC ACID: SIGNIFICANT CHANGE UP
-  AMOXICILLIN/CLAVULANIC ACID: SIGNIFICANT CHANGE UP
-  AMPICILLIN/SULBACTAM: SIGNIFICANT CHANGE UP
-  AMPICILLIN/SULBACTAM: SIGNIFICANT CHANGE UP
-  AMPICILLIN: SIGNIFICANT CHANGE UP
-  AMPICILLIN: SIGNIFICANT CHANGE UP
-  AZTREONAM: SIGNIFICANT CHANGE UP
-  AZTREONAM: SIGNIFICANT CHANGE UP
-  CEFAZOLIN: SIGNIFICANT CHANGE UP
-  CEFAZOLIN: SIGNIFICANT CHANGE UP
-  CEFEPIME: SIGNIFICANT CHANGE UP
-  CEFEPIME: SIGNIFICANT CHANGE UP
-  CEFOXITIN: SIGNIFICANT CHANGE UP
-  CEFOXITIN: SIGNIFICANT CHANGE UP
-  CEFTRIAXONE: SIGNIFICANT CHANGE UP
-  CEFTRIAXONE: SIGNIFICANT CHANGE UP
-  CEFUROXIME: SIGNIFICANT CHANGE UP
-  CEFUROXIME: SIGNIFICANT CHANGE UP
-  CIPROFLOXACIN: SIGNIFICANT CHANGE UP
-  CIPROFLOXACIN: SIGNIFICANT CHANGE UP
-  ERTAPENEM: SIGNIFICANT CHANGE UP
-  ERTAPENEM: SIGNIFICANT CHANGE UP
-  GENTAMICIN: SIGNIFICANT CHANGE UP
-  GENTAMICIN: SIGNIFICANT CHANGE UP
-  IMIPENEM: SIGNIFICANT CHANGE UP
-  IMIPENEM: SIGNIFICANT CHANGE UP
-  LEVOFLOXACIN: SIGNIFICANT CHANGE UP
-  LEVOFLOXACIN: SIGNIFICANT CHANGE UP
-  MEROPENEM: SIGNIFICANT CHANGE UP
-  MEROPENEM: SIGNIFICANT CHANGE UP
-  NITROFURANTOIN: SIGNIFICANT CHANGE UP
-  NITROFURANTOIN: SIGNIFICANT CHANGE UP
-  PIPERACILLIN/TAZOBACTAM: SIGNIFICANT CHANGE UP
-  PIPERACILLIN/TAZOBACTAM: SIGNIFICANT CHANGE UP
-  TOBRAMYCIN: SIGNIFICANT CHANGE UP
-  TOBRAMYCIN: SIGNIFICANT CHANGE UP
-  TRIMETHOPRIM/SULFAMETHOXAZOLE: SIGNIFICANT CHANGE UP
-  TRIMETHOPRIM/SULFAMETHOXAZOLE: SIGNIFICANT CHANGE UP
CULTURE RESULTS: ABNORMAL
CULTURE RESULTS: ABNORMAL
METHOD TYPE: SIGNIFICANT CHANGE UP
METHOD TYPE: SIGNIFICANT CHANGE UP
ORGANISM # SPEC MICROSCOPIC CNT: ABNORMAL
ORGANISM # SPEC MICROSCOPIC CNT: ABNORMAL
ORGANISM # SPEC MICROSCOPIC CNT: SIGNIFICANT CHANGE UP
ORGANISM # SPEC MICROSCOPIC CNT: SIGNIFICANT CHANGE UP
SPECIMEN SOURCE: SIGNIFICANT CHANGE UP
SPECIMEN SOURCE: SIGNIFICANT CHANGE UP

## 2024-03-18 ENCOUNTER — APPOINTMENT (OUTPATIENT)
Dept: UROLOGY | Facility: CLINIC | Age: 44
End: 2024-03-18
Payer: COMMERCIAL

## 2024-03-18 VITALS
WEIGHT: 185 LBS | RESPIRATION RATE: 16 BRPM | HEIGHT: 59 IN | BODY MASS INDEX: 37.29 KG/M2 | DIASTOLIC BLOOD PRESSURE: 85 MMHG | HEART RATE: 66 BPM | OXYGEN SATURATION: 98 % | SYSTOLIC BLOOD PRESSURE: 139 MMHG | TEMPERATURE: 97.5 F

## 2024-03-18 PROCEDURE — 99204 OFFICE O/P NEW MOD 45 MIN: CPT

## 2024-03-18 NOTE — REVIEW OF SYSTEMS
[Eyesight Problems] : eyesight problems [see HPI] : see HPI [Loss of interest] : loss of interest in sexual activity [Blood in urine that you can see] : blood visible in urine [Urine Infection (bladder/kidney)] : bladder/kidney infection [Told you have blood in urine on a urine test] : told blood was present in a urine test [Wake up at night to urinate  How many times?  ___] : wakes up to urinate [unfilled] times during the night [Urine retention] : urine retention [Strong urge to urinate] : strong urge to urinate [Bladder pressure] : experiences bladder pressure [Limb Swelling] : limb swelling [Negative] : Endocrine

## 2024-03-18 NOTE — PHYSICAL EXAM
[Normal Appearance] : normal appearance [Well Groomed] : well groomed [Edema] : no peripheral edema [General Appearance - In No Acute Distress] : no acute distress [Exaggerated Use Of Accessory Muscles For Inspiration] : no accessory muscle use [Respiration, Rhythm And Depth] : normal respiratory rhythm and effort [Abdomen Soft] : soft [Costovertebral Angle Tenderness] : no ~M costovertebral angle tenderness [Abdomen Tenderness] : non-tender [Urinary Bladder Findings] : the bladder was normal on palpation [Normal Station and Gait] : the gait and station were normal for the patient's age [] : no rash [No Focal Deficits] : no focal deficits [Oriented To Time, Place, And Person] : oriented to person, place, and time [Affect] : the affect was normal [Mood] : the mood was normal [No Palpable Adenopathy] : no palpable adenopathy

## 2024-03-18 NOTE — HISTORY OF PRESENT ILLNESS
[FreeTextEntry1] : 44yo female with cc of gross hematuria.   pt reports that back in Dec she developed gross hematruia. She did have some SP pain and dysuria at the time as well as urgency. She went to PCP and was told that there was no infection. Hematuria resolved on its own. Had again in Jan. None since but has blood on UA per report.   No tobacco hx. No hx of stones. On coumadin for heart valve since 2017. Occasional issues with UTIs. At baseline, occasional urgency and urge leakage.   Had US that was negative per report. Saw another urologist but did not feel comfortable to do cysto.

## 2024-03-18 NOTE — REVIEW OF SYSTEMS
[Eyesight Problems] : eyesight problems [Loss of interest] : loss of interest in sexual activity [see HPI] : see HPI [Blood in urine that you can see] : blood visible in urine [Urine Infection (bladder/kidney)] : bladder/kidney infection [Told you have blood in urine on a urine test] : told blood was present in a urine test [Urine retention] : urine retention [Wake up at night to urinate  How many times?  ___] : wakes up to urinate [unfilled] times during the night [Strong urge to urinate] : strong urge to urinate [Bladder pressure] : experiences bladder pressure [Limb Swelling] : limb swelling [Negative] : Endocrine

## 2024-03-18 NOTE — HISTORY OF PRESENT ILLNESS
[FreeTextEntry1] : 42yo female with cc of gross hematuria.   pt reports that back in Dec she developed gross hematruia. She did have some SP pain and dysuria at the time as well as urgency. She went to PCP and was told that there was no infection. Hematuria resolved on its own. Had again in Jan. None since but has blood on UA per report.   No tobacco hx. No hx of stones. On coumadin for heart valve since 2017. Occasional issues with UTIs. At baseline, occasional urgency and urge leakage.   Had US that was negative per report. Saw another urologist but did not feel comfortable to do cysto.

## 2024-03-20 LAB
APPEARANCE: CLEAR
BACTERIA UR CULT: NORMAL
BACTERIA: NEGATIVE /HPF
BILIRUBIN URINE: NEGATIVE
BLOOD URINE: ABNORMAL
CALCIUM OXALATE CRYSTALS: PRESENT
CAST: 0 /LPF
COLOR: YELLOW
EPITHELIAL CELLS: 5 /HPF
GLUCOSE QUALITATIVE U: NEGATIVE MG/DL
KETONES URINE: NEGATIVE MG/DL
LEUKOCYTE ESTERASE URINE: NEGATIVE
MICROSCOPIC-UA: NORMAL
NITRITE URINE: NEGATIVE
PH URINE: 5.5
PROTEIN URINE: 30 MG/DL
RED BLOOD CELLS URINE: 3 /HPF
REVIEW: NORMAL
SPECIFIC GRAVITY URINE: 1.02
URINE CYTOLOGY: NORMAL
UROBILINOGEN URINE: 0.2 MG/DL
WHITE BLOOD CELLS URINE: 2 /HPF

## 2024-03-23 ENCOUNTER — RESULT REVIEW (OUTPATIENT)
Age: 44
End: 2024-03-23

## 2024-03-29 ENCOUNTER — APPOINTMENT (OUTPATIENT)
Dept: CT IMAGING | Facility: IMAGING CENTER | Age: 44
End: 2024-03-29
Payer: COMMERCIAL

## 2024-03-29 ENCOUNTER — OUTPATIENT (OUTPATIENT)
Dept: OUTPATIENT SERVICES | Facility: HOSPITAL | Age: 44
LOS: 1 days | End: 2024-03-29
Payer: COMMERCIAL

## 2024-03-29 DIAGNOSIS — R31.0 GROSS HEMATURIA: ICD-10-CM

## 2024-03-29 DIAGNOSIS — Z98.891 HISTORY OF UTERINE SCAR FROM PREVIOUS SURGERY: Chronic | ICD-10-CM

## 2024-03-29 DIAGNOSIS — Z95.2 PRESENCE OF PROSTHETIC HEART VALVE: Chronic | ICD-10-CM

## 2024-03-29 PROCEDURE — 74178 CT ABD&PLV WO CNTR FLWD CNTR: CPT

## 2024-03-29 PROCEDURE — 74178 CT ABD&PLV WO CNTR FLWD CNTR: CPT | Mod: 26

## 2024-05-12 ENCOUNTER — TRANSCRIPTION ENCOUNTER (OUTPATIENT)
Age: 44
End: 2024-05-12

## 2024-05-20 ENCOUNTER — OUTPATIENT (OUTPATIENT)
Dept: OUTPATIENT SERVICES | Facility: HOSPITAL | Age: 44
LOS: 1 days | End: 2024-05-20
Payer: COMMERCIAL

## 2024-05-20 ENCOUNTER — APPOINTMENT (OUTPATIENT)
Dept: UROLOGY | Facility: CLINIC | Age: 44
End: 2024-05-20
Payer: COMMERCIAL

## 2024-05-20 VITALS — SYSTOLIC BLOOD PRESSURE: 156 MMHG | HEART RATE: 74 BPM | OXYGEN SATURATION: 94 % | DIASTOLIC BLOOD PRESSURE: 88 MMHG

## 2024-05-20 DIAGNOSIS — Z98.891 HISTORY OF UTERINE SCAR FROM PREVIOUS SURGERY: Chronic | ICD-10-CM

## 2024-05-20 DIAGNOSIS — Z95.2 PRESENCE OF PROSTHETIC HEART VALVE: Chronic | ICD-10-CM

## 2024-05-20 DIAGNOSIS — R31.0 GROSS HEMATURIA: ICD-10-CM

## 2024-05-20 DIAGNOSIS — R35.0 FREQUENCY OF MICTURITION: ICD-10-CM

## 2024-05-20 PROCEDURE — 52000 CYSTOURETHROSCOPY: CPT

## 2024-05-21 DIAGNOSIS — R31.0 GROSS HEMATURIA: ICD-10-CM

## 2024-06-25 NOTE — PATIENT PROFILE ADULT. - TOBACCO USE
Detail Level: Generalized Azithromycin Pregnancy And Lactation Text: This medication is considered safe during pregnancy and is also secreted in breast milk. Tetracycline Counseling: Patient counseled regarding possible photosensitivity and increased risk for sunburn.  Patient instructed to avoid sunlight, if possible.  When exposed to sunlight, patients should wear protective clothing, sunglasses, and sunscreen.  The patient was instructed to call the office immediately if the following severe adverse effects occur:  hearing changes, easy bruising/bleeding, severe headache, or vision changes.  The patient verbalized understanding of the proper use and possible adverse effects of tetracycline.  All of the patient's questions and concerns were addressed. Patient understands to avoid pregnancy while on therapy due to potential birth defects. Doxycycline Counseling:  Patient counseled regarding possible photosensitivity and increased risk for sunburn.  Patient instructed to avoid sunlight, if possible.  When exposed to sunlight, patients should wear protective clothing, sunglasses, and sunscreen.  The patient was instructed to call the office immediately if the following severe adverse effects occur:  hearing changes, easy bruising/bleeding, severe headache, or vision changes.  The patient verbalized understanding of the proper use and possible adverse effects of doxycycline.  All of the patient's questions and concerns were addressed. High Dose Vitamin A Pregnancy And Lactation Text: High dose vitamin A therapy is contraindicated during pregnancy and breast feeding. Benzoyl Peroxide Pregnancy And Lactation Text: This medication is Pregnancy Category C. It is unknown if benzoyl peroxide is excreted in breast milk. Topical Sulfur Applications Counseling: Topical Sulfur Counseling: Patient counseled that this medication may cause skin irritation or allergic reactions.  In the event of skin irritation, the patient was advised to reduce the amount of the drug applied or use it less frequently.   The patient verbalized understanding of the proper use and possible adverse effects of topical sulfur application.  All of the patient's questions and concerns were addressed. Erythromycin Counseling:  I discussed with the patient the risks of erythromycin including but not limited to GI upset, allergic reaction, drug rash, diarrhea, increase in liver enzymes, and yeast infections. Bactrim Pregnancy And Lactation Text: This medication is Pregnancy Category D and is known to cause fetal risk.  It is also excreted in breast milk. Minocycline Pregnancy And Lactation Text: This medication is Pregnancy Category D and not consider safe during pregnancy. It is also excreted in breast milk. Winlevi Counseling:  I discussed with the patient the risks of topical clascoterone including but not limited to erythema, scaling, itching, and stinging. Patient voiced their understanding. Topical Retinoid Pregnancy And Lactation Text: This medication is Pregnancy Category C. It is unknown if this medication is excreted in breast milk. Aklief counseling:  Patient advised to apply a pea-sized amount only at bedtime and wait 30 minutes after washing their face before applying.  If too drying, patient may add a non-comedogenic moisturizer.  The most commonly reported side effects including irritation, redness, scaling, dryness, stinging, burning, itching, and increased risk of sunburn.  The patient verbalized understanding of the proper use and possible adverse effects of retinoids.  All of the patient's questions and concerns were addressed. Azelaic Acid Counseling: Patient counseled that medicine may cause skin irritation and to avoid applying near the eyes.  In the event of skin irritation, the patient was advised to reduce the amount of the drug applied or use it less frequently.   The patient verbalized understanding of the proper use and possible adverse effects of azelaic acid.  All of the patient's questions and concerns were addressed. Tazorac Pregnancy And Lactation Text: This medication is not safe during pregnancy. It is unknown if this medication is excreted in breast milk. Birth Control Pills Pregnancy And Lactation Text: This medication should be avoided if pregnant and for the first 30 days post-partum. Isotretinoin Counseling: Patient should get monthly blood tests, not donate blood, not drive at night if vision affected, not share medication, and not undergo elective surgery for 6 months after tx completed. Side effects reviewed, pt to contact office should one occur. Azithromycin Counseling:  I discussed with the patient the risks of azithromycin including but not limited to GI upset, allergic reaction, drug rash, diarrhea, and yeast infections. Dapsone Pregnancy And Lactation Text: This medication is Pregnancy Category C and is not considered safe during pregnancy or breast feeding. Benzoyl Peroxide Counseling: Patient counseled that medicine may cause skin irritation and bleach clothing.  In the event of skin irritation, the patient was advised to reduce the amount of the drug applied or use it less frequently.   The patient verbalized understanding of the proper use and possible adverse effects of benzoyl peroxide.  All of the patient's questions and concerns were addressed. Topical Clindamycin Pregnancy And Lactation Text: This medication is Pregnancy Category B and is considered safe during pregnancy. It is unknown if it is excreted in breast milk. Spironolactone Pregnancy And Lactation Text: This medication can cause feminization of the male fetus and should be avoided during pregnancy. The active metabolite is also found in breast milk. High Dose Vitamin A Counseling: Side effects reviewed, pt to contact office should one occur. Bactrim Counseling:  I discussed with the patient the risks of sulfa antibiotics including but not limited to GI upset, allergic reaction, drug rash, diarrhea, dizziness, photosensitivity, and yeast infections.  Rarely, more serious reactions can occur including but not limited to aplastic anemia, agranulocytosis, methemoglobinemia, blood dyscrasias, liver or kidney failure, lung infiltrates or desquamative/blistering drug rashes. Include Pregnancy/Lactation Warning?: No Doxycycline Pregnancy And Lactation Text: This medication is Pregnancy Category D and not consider safe during pregnancy. It is also excreted in breast milk but is considered safe for shorter treatment courses. Never smoker Minocycline Counseling: Patient advised regarding possible photosensitivity and discoloration of the teeth, skin, lips, tongue and gums.  Patient instructed to avoid sunlight, if possible.  When exposed to sunlight, patients should wear protective clothing, sunglasses, and sunscreen.  The patient was instructed to call the office immediately if the following severe adverse effects occur:  hearing changes, easy bruising/bleeding, severe headache, or vision changes.  The patient verbalized understanding of the proper use and possible adverse effects of minocycline.  All of the patient's questions and concerns were addressed. Topical Retinoid counseling:  Patient advised to apply a pea-sized amount only at bedtime and wait 30 minutes after washing their face before applying.  If too drying, patient may add a non-comedogenic moisturizer. The patient verbalized understanding of the proper use and possible adverse effects of retinoids.  All of the patient's questions and concerns were addressed. Topical Sulfur Applications Pregnancy And Lactation Text: This medication is Pregnancy Category C and has an unknown safety profile during pregnancy. It is unknown if this topical medication is excreted in breast milk. Birth Control Pills Counseling: Birth Control Pill Counseling: I discussed with the patient the potential side effects of OCPs including but not limited to increased risk of stroke, heart attack, thrombophlebitis, deep venous thrombosis, hepatic adenomas, breast changes, GI upset, headaches, and depression.  The patient verbalized understanding of the proper use and possible adverse effects of OCPs. All of the patient's questions and concerns were addressed. Erythromycin Pregnancy And Lactation Text: This medication is Pregnancy Category B and is considered safe during pregnancy. It is also excreted in breast milk. Detail Level: Zone Sarecycline Counseling: Patient advised regarding possible photosensitivity and discoloration of the teeth, skin, lips, tongue and gums.  Patient instructed to avoid sunlight, if possible.  When exposed to sunlight, patients should wear protective clothing, sunglasses, and sunscreen.  The patient was instructed to call the office immediately if the following severe adverse effects occur:  hearing changes, easy bruising/bleeding, severe headache, or vision changes.  The patient verbalized understanding of the proper use and possible adverse effects of sarecycline.  All of the patient's questions and concerns were addressed. Aklief Pregnancy And Lactation Text: It is unknown if this medication is safe to use during pregnancy.  It is unknown if this medication is excreted in breast milk.  Breastfeeding women should use the topical cream on the smallest area of the skin for the shortest time needed while breastfeeding.  Do not apply to nipple and areola. Tazorac Counseling:  Patient advised that medication is irritating and drying.  Patient may need to apply sparingly and wash off after an hour before eventually leaving it on overnight.  The patient verbalized understanding of the proper use and possible adverse effects of tazorac.  All of the patient's questions and concerns were addressed. Winlevi Pregnancy And Lactation Text: This medication is considered safe during pregnancy and breastfeeding. Dapsone Counseling: I discussed with the patient the risks of dapsone including but not limited to hemolytic anemia, agranulocytosis, rashes, methemoglobinemia, kidney failure, peripheral neuropathy, headaches, GI upset, and liver toxicity.  Patients who start dapsone require monitoring including baseline LFTs and weekly CBCs for the first month, then every month thereafter.  The patient verbalized understanding of the proper use and possible adverse effects of dapsone.  All of the patient's questions and concerns were addressed. Isotretinoin Pregnancy And Lactation Text: This medication is Pregnancy Category X and is considered extremely dangerous during pregnancy. It is unknown if it is excreted in breast milk. Azelaic Acid Pregnancy And Lactation Text: This medication is considered safe during pregnancy and breast feeding. Topical Clindamycin Counseling: Patient counseled that this medication may cause skin irritation or allergic reactions.  In the event of skin irritation, the patient was advised to reduce the amount of the drug applied or use it less frequently.   The patient verbalized understanding of the proper use and possible adverse effects of clindamycin.  All of the patient's questions and concerns were addressed. Spironolactone Counseling: Patient advised regarding risks of diarrhea, abdominal pain, hyperkalemia, birth defects (for female patients), liver toxicity and renal toxicity. The patient may need blood work to monitor liver and kidney function and potassium levels while on therapy. The patient verbalized understanding of the proper use and possible adverse effects of spironolactone.  All of the patient's questions and concerns were addressed.

## 2024-10-25 ENCOUNTER — EMERGENCY (EMERGENCY)
Facility: HOSPITAL | Age: 44
LOS: 0 days | Discharge: HOME | End: 2024-10-26
Attending: STUDENT IN AN ORGANIZED HEALTH CARE EDUCATION/TRAINING PROGRAM
Payer: COMMERCIAL

## 2024-10-25 VITALS
TEMPERATURE: 99 F | OXYGEN SATURATION: 95 % | SYSTOLIC BLOOD PRESSURE: 130 MMHG | WEIGHT: 190.92 LBS | RESPIRATION RATE: 18 BRPM | DIASTOLIC BLOOD PRESSURE: 76 MMHG | HEIGHT: 59 IN | HEART RATE: 71 BPM

## 2024-10-25 DIAGNOSIS — Z79.01 LONG TERM (CURRENT) USE OF ANTICOAGULANTS: ICD-10-CM

## 2024-10-25 DIAGNOSIS — I10 ESSENTIAL (PRIMARY) HYPERTENSION: ICD-10-CM

## 2024-10-25 DIAGNOSIS — R10.31 RIGHT LOWER QUADRANT PAIN: ICD-10-CM

## 2024-10-25 DIAGNOSIS — I34.0 NONRHEUMATIC MITRAL (VALVE) INSUFFICIENCY: ICD-10-CM

## 2024-10-25 DIAGNOSIS — N39.0 URINARY TRACT INFECTION, SITE NOT SPECIFIED: ICD-10-CM

## 2024-10-25 DIAGNOSIS — Z88.8 ALLERGY STATUS TO OTHER DRUGS, MEDICAMENTS AND BIOLOGICAL SUBSTANCES: ICD-10-CM

## 2024-10-25 DIAGNOSIS — K63.89 OTHER SPECIFIED DISEASES OF INTESTINE: ICD-10-CM

## 2024-10-25 DIAGNOSIS — R11.0 NAUSEA: ICD-10-CM

## 2024-10-25 DIAGNOSIS — Z95.2 PRESENCE OF PROSTHETIC HEART VALVE: Chronic | ICD-10-CM

## 2024-10-25 DIAGNOSIS — E78.5 HYPERLIPIDEMIA, UNSPECIFIED: ICD-10-CM

## 2024-10-25 DIAGNOSIS — Z98.891 HISTORY OF UTERINE SCAR FROM PREVIOUS SURGERY: Chronic | ICD-10-CM

## 2024-10-25 LAB
ALBUMIN SERPL ELPH-MCNC: 3.4 G/DL — SIGNIFICANT CHANGE UP (ref 3.3–5)
ALP SERPL-CCNC: 75 U/L — SIGNIFICANT CHANGE UP (ref 40–120)
ALT FLD-CCNC: 23 U/L — SIGNIFICANT CHANGE UP (ref 12–78)
ANION GAP SERPL CALC-SCNC: 7 MMOL/L — SIGNIFICANT CHANGE UP (ref 5–17)
APPEARANCE UR: ABNORMAL
APTT BLD: 48.9 SEC — HIGH (ref 24.5–35.6)
AST SERPL-CCNC: 16 U/L — SIGNIFICANT CHANGE UP (ref 15–37)
BASOPHILS # BLD AUTO: 0.01 K/UL — SIGNIFICANT CHANGE UP (ref 0–0.2)
BASOPHILS NFR BLD AUTO: 0.1 % — SIGNIFICANT CHANGE UP (ref 0–2)
BILIRUB SERPL-MCNC: 1.2 MG/DL — SIGNIFICANT CHANGE UP (ref 0.2–1.2)
BILIRUB UR-MCNC: NEGATIVE — SIGNIFICANT CHANGE UP
BUN SERPL-MCNC: 7 MG/DL — SIGNIFICANT CHANGE UP (ref 7–23)
CALCIUM SERPL-MCNC: 8.7 MG/DL — SIGNIFICANT CHANGE UP (ref 8.5–10.1)
CHLORIDE SERPL-SCNC: 104 MMOL/L — SIGNIFICANT CHANGE UP (ref 96–108)
CO2 SERPL-SCNC: 26 MMOL/L — SIGNIFICANT CHANGE UP (ref 22–31)
COLOR SPEC: YELLOW — SIGNIFICANT CHANGE UP
CREAT SERPL-MCNC: 0.6 MG/DL — SIGNIFICANT CHANGE UP (ref 0.5–1.3)
DIFF PNL FLD: ABNORMAL
EGFR: 115 ML/MIN/1.73M2 — SIGNIFICANT CHANGE UP
EOSINOPHIL # BLD AUTO: 0.02 K/UL — SIGNIFICANT CHANGE UP (ref 0–0.5)
EOSINOPHIL NFR BLD AUTO: 0.3 % — SIGNIFICANT CHANGE UP (ref 0–6)
GLUCOSE SERPL-MCNC: 84 MG/DL — SIGNIFICANT CHANGE UP (ref 70–99)
GLUCOSE UR QL: NEGATIVE MG/DL — SIGNIFICANT CHANGE UP
HCG SERPL-ACNC: <1 MIU/ML — SIGNIFICANT CHANGE UP
HCT VFR BLD CALC: 35.8 % — SIGNIFICANT CHANGE UP (ref 34.5–45)
HGB BLD-MCNC: 11.5 G/DL — SIGNIFICANT CHANGE UP (ref 11.5–15.5)
IMM GRANULOCYTES NFR BLD AUTO: 0.4 % — SIGNIFICANT CHANGE UP (ref 0–0.9)
INR BLD: 3.31 RATIO — HIGH (ref 0.85–1.16)
KETONES UR-MCNC: NEGATIVE MG/DL — SIGNIFICANT CHANGE UP
LACTATE SERPL-SCNC: 1 MMOL/L — SIGNIFICANT CHANGE UP (ref 0.7–2)
LEUKOCYTE ESTERASE UR-ACNC: ABNORMAL
LIDOCAIN IGE QN: 25 U/L — SIGNIFICANT CHANGE UP (ref 13–75)
LYMPHOCYTES # BLD AUTO: 2.01 K/UL — SIGNIFICANT CHANGE UP (ref 1–3.3)
LYMPHOCYTES # BLD AUTO: 27.5 % — SIGNIFICANT CHANGE UP (ref 13–44)
MAGNESIUM SERPL-MCNC: 1.8 MG/DL — SIGNIFICANT CHANGE UP (ref 1.6–2.6)
MCHC RBC-ENTMCNC: 25 PG — LOW (ref 27–34)
MCHC RBC-ENTMCNC: 32.1 G/DL — SIGNIFICANT CHANGE UP (ref 32–36)
MCV RBC AUTO: 77.8 FL — LOW (ref 80–100)
MONOCYTES # BLD AUTO: 0.32 K/UL — SIGNIFICANT CHANGE UP (ref 0–0.9)
MONOCYTES NFR BLD AUTO: 4.4 % — SIGNIFICANT CHANGE UP (ref 2–14)
NEUTROPHILS # BLD AUTO: 4.93 K/UL — SIGNIFICANT CHANGE UP (ref 1.8–7.4)
NEUTROPHILS NFR BLD AUTO: 67.3 % — SIGNIFICANT CHANGE UP (ref 43–77)
NITRITE UR-MCNC: NEGATIVE — SIGNIFICANT CHANGE UP
NRBC # BLD: 0 /100 WBCS — SIGNIFICANT CHANGE UP (ref 0–0)
PH UR: 5.5 — SIGNIFICANT CHANGE UP (ref 5–8)
PLATELET # BLD AUTO: 223 K/UL — SIGNIFICANT CHANGE UP (ref 150–400)
POTASSIUM SERPL-MCNC: 3.4 MMOL/L — LOW (ref 3.5–5.3)
POTASSIUM SERPL-SCNC: 3.4 MMOL/L — LOW (ref 3.5–5.3)
PROT SERPL-MCNC: 7.1 GM/DL — SIGNIFICANT CHANGE UP (ref 6–8.3)
PROT UR-MCNC: 100 MG/DL
PROTHROM AB SERPL-ACNC: 38 SEC — HIGH (ref 9.9–13.4)
RBC # BLD: 4.6 M/UL — SIGNIFICANT CHANGE UP (ref 3.8–5.2)
RBC # FLD: 14.2 % — SIGNIFICANT CHANGE UP (ref 10.3–14.5)
SODIUM SERPL-SCNC: 137 MMOL/L — SIGNIFICANT CHANGE UP (ref 135–145)
SP GR SPEC: 1.01 — SIGNIFICANT CHANGE UP (ref 1–1.03)
UROBILINOGEN FLD QL: 1 MG/DL — SIGNIFICANT CHANGE UP (ref 0.2–1)
WBC # BLD: 7.32 K/UL — SIGNIFICANT CHANGE UP (ref 3.8–10.5)
WBC # FLD AUTO: 7.32 K/UL — SIGNIFICANT CHANGE UP (ref 3.8–10.5)

## 2024-10-25 PROCEDURE — 93010 ELECTROCARDIOGRAM REPORT: CPT

## 2024-10-25 PROCEDURE — 74177 CT ABD & PELVIS W/CONTRAST: CPT | Mod: 26,MC

## 2024-10-25 PROCEDURE — 76830 TRANSVAGINAL US NON-OB: CPT | Mod: 26

## 2024-10-25 PROCEDURE — 99285 EMERGENCY DEPT VISIT HI MDM: CPT

## 2024-10-25 PROCEDURE — 76705 ECHO EXAM OF ABDOMEN: CPT | Mod: 26

## 2024-10-25 RX ORDER — MORPHINE SULFATE 30 MG/1
2 TABLET, FILM COATED, EXTENDED RELEASE ORAL ONCE
Refills: 0 | Status: DISCONTINUED | OUTPATIENT
Start: 2024-10-25 | End: 2024-10-25

## 2024-10-25 RX ORDER — CEFTRIAXONE SODIUM 1 G
1000 VIAL (EA) INJECTION ONCE
Refills: 0 | Status: COMPLETED | OUTPATIENT
Start: 2024-10-25 | End: 2024-10-25

## 2024-10-25 RX ORDER — ACETAMINOPHEN 325 MG
1000 TABLET ORAL ONCE
Refills: 0 | Status: DISCONTINUED | OUTPATIENT
Start: 2024-10-25 | End: 2024-10-25

## 2024-10-25 RX ORDER — ONDANSETRON HCL/PF 4 MG/2 ML
4 VIAL (ML) INJECTION ONCE
Refills: 0 | Status: DISCONTINUED | OUTPATIENT
Start: 2024-10-25 | End: 2024-10-26

## 2024-10-25 RX ORDER — ACETAMINOPHEN 325 MG
1000 TABLET ORAL ONCE
Refills: 0 | Status: COMPLETED | OUTPATIENT
Start: 2024-10-25 | End: 2024-10-25

## 2024-10-25 RX ORDER — SODIUM CHLORIDE 0.9 % (FLUSH) 0.9 %
1000 SYRINGE (ML) INJECTION ONCE
Refills: 0 | Status: COMPLETED | OUTPATIENT
Start: 2024-10-25 | End: 2024-10-25

## 2024-10-25 RX ADMIN — MORPHINE SULFATE 2 MILLIGRAM(S): 30 TABLET, FILM COATED, EXTENDED RELEASE ORAL at 22:19

## 2024-10-25 RX ADMIN — Medication 1000 MILLIGRAM(S): at 21:49

## 2024-10-25 RX ADMIN — Medication 100 MILLIGRAM(S): at 21:18

## 2024-10-25 RX ADMIN — Medication 400 MILLIGRAM(S): at 18:27

## 2024-10-25 RX ADMIN — Medication 1000 MILLILITER(S): at 18:27

## 2024-10-25 RX ADMIN — MORPHINE SULFATE 2 MILLIGRAM(S): 30 TABLET, FILM COATED, EXTENDED RELEASE ORAL at 21:49

## 2024-10-25 RX ADMIN — Medication 1000 MILLIGRAM(S): at 21:48

## 2024-10-25 RX ADMIN — Medication 40 MILLIEQUIVALENT(S): at 22:32

## 2024-10-25 NOTE — ED ADULT NURSE REASSESSMENT NOTE - NS ED NURSE REASSESS COMMENT FT1
Pt aox3, amb at baseline but difficulty at this time dt RLQ abd pain 10/10. IV site & patency inspected and integrity maintained. left ac 22g patent, NS bolus and ABX initiated per MD order. Pt denies nausea at this time. Pt pending CTr, Will continue to monitor.

## 2024-10-25 NOTE — ED ADULT TRIAGE NOTE - CHIEF COMPLAINT QUOTE
pt c/o RLQ abdominal pain/tenderness radiating to RUQ x 4 days. + diarrhea, nausea. hx: appendicitis, , mechanical heart valve takes coumadin

## 2024-10-25 NOTE — ED PROVIDER NOTE - NSICDXPASTSURGICALHX_GEN_ALL_CORE_FT
PAST SURGICAL HISTORY:  S/P      S/P MVR (mitral valve replacement) Mechanical MVR at [facility] on 17

## 2024-10-25 NOTE — ED PROVIDER NOTE - PHYSICAL EXAMINATION
CONSTITUTIONAL: Appears uncomfortable   HEAD: Normocephalic, no obvious signs of trauma  EENT: PERRL, EOMI w/o pain, no nystagmus, nares patent no drainage, no pharyngeal erythema, swelling, or exudates  NECK: Trachea midline, no goiter  RESP: L/S equal clr, bilat, apices and bases, no accessory muscle use, speaking full sentences  CARDIC: RRR, +S1/S2, no peripheral edema  GI: ABD soft, nondistended, tender RUQ/RLQ on palpation, no palpable masses  : No CVA Tenderness  MSK: 5/5 strength extremities x 4, full ROM without pain, no midline spinal tenderness on palpation  SKIN: No rashes, normal color/condition   NEURO: A&OX4, CN intact, No focal motor deficits/weakness, no sensory deficits, no dysmetria, no slurred speech, no facial droop, normal gait

## 2024-10-25 NOTE — ED PROVIDER NOTE - OBJECTIVE STATEMENT
44-year-old female past medical history of MVR on Coumadin, appendectomy, HLD presenting to ED complaining of 2 days of right lower quadrant abdominal pain with nausea.  Patient states she believes pain started after eating fried food at a party.  Denies vomiting, no fever/chills, no diarrhea.  Last BM this morning, states normal no blood.  Patient seen at PMD office today for abdominal pain and was sent to ED for further evaluation. States pain is worse with movement and when taking a deep breath, endorses dark urine but dnies hematuria/urinary symptoms. Denies pelvic pain/vaginal bleeding/discharge, LMP 1 week ago. Denies chest pain, palpitations, SOB, cough, headache, lightheadedness/dizziness, LOC.

## 2024-10-25 NOTE — ED ADULT NURSE NOTE - NSICDXPASTMEDICALHX_GEN_ALL_CORE_FT
PAST MEDICAL HISTORY:  HLD (hyperlipidemia)     HTN (hypertension)     MR (mitral regurgitation) S/P mechanical MVR on 6/7/17 at [facility]    MVP (mitral valve prolapse)     Osteomyelitis of arm Right arm osteomyelitis, treated in [facility] in 1998

## 2024-10-25 NOTE — ED ADULT NURSE REASSESSMENT NOTE - NS ED NURSE REASSESS COMMENT FT1
Report received from IVTETE Lugo. Pt not in assigned room, Pt at [facility] at this time.

## 2024-10-25 NOTE — ED PROVIDER NOTE - ATTENDING CONTRIBUTION TO CARE
44-year-old female pmhx of mitral valve regurgitation, appendectomy, hypertension, hyperlipidemia comes to ED w/ right upper/lower quadrant abdominal pain. Started randomly 3 days prior.  Due to persistence of symptoms patient decided to come to the emergency department. Their pain/symptom is moderate to severe, constant, non mediating with rest. Reports symptoms of nausea.  Otherwise ROS negative.    General: Uncomfortable in room  HEENT: NCAT, PERRL   Cardiac: RRR, no murmurs, 2+ peripheral pulses   Chest: CTAB  Abdomen: Generalized tenderness palpation worse in the right lower quadrant.  No rebound or guarding.  Soft, non-distended, bowel sounds present.  Extremities: no peripheral edema, calf tenderness, or leg size discrepancies   Skin: no rashes   Neuro: AAOx4, 5+motor, sensory grossly intact   Psych: mood and affect appropriate    Impression: 44-year-old female pmhx of mitral valve regurgitation, appendectomy, hypertension, hyperlipidemia comes to ED w/ right upper/lower quadrant abdominal pain. Their symptoms and exam findings  are concerning for biliary pathology, UTI, viral illness, other intra-abdominal pathology.  Unlikely for  pathology however will eval with ultrasound.    Ordered labs, imaging, medications for diagnosis, management, and treatment.

## 2024-10-25 NOTE — ED PROVIDER NOTE - CLINICAL SUMMARY MEDICAL DECISION MAKING FREE TEXT BOX
- 44-year-old female past medical history of MVR on Coumadin, appendectomy, HLD presenting to ED complaining of 2 days of right lower quadrant abdominal pain with nausea.  Patient states she believes pain started after eating fried food at a party.  Denies vomiting, no fever/chills, no diarrhea.  Last BM this morning, states normal no blood.  Patient seen at PMD office today for abdominal pain and was sent to ED for further evaluation. States pain is worse with movement and when taking a deep breath, endorses dark urine but denies hematuria/urinary symptoms. Denies pelvic pain/vaginal bleeding/discharge, LMP 1 week ago. Denies chest pain, palpitations, SOB, cough, headache, lightheadedness/dizziness, LOC.  - Patient status post appendectomy, will obtain CT abdomen pelvis to assess diverticulitis, colitis, cholecystitis.  Lower suspicion for SBO given is passing gas, last BM this morning, no vomiting.  Differential includes gastritis given symptoms started after eating food at a party.  Will order for meds and Zofran for pain/nausea.  Patient denies pelvic pain, nontender, no vaginal bleeding/discharge-low suspicion for ovarian cyst/torsion.  Will obtain UA to assess UTI/hematuria, will evaluate for renal stones on CT.  Dispo pending results and reassessment.

## 2024-10-25 NOTE — ED ADULT NURSE NOTE - ED STAT RN HANDOFF DETAILS
Received report from IVETTE Lugo. Pt at [facility] at this time. Family in assigned room, updates given. Received report from Day IVETTE Lugo who initiated care. Pt at [facility] at this time. Family in assigned room, updates given.

## 2024-10-25 NOTE — ED PROVIDER NOTE - NSICDXPASTMEDICALHX_GEN_ALL_CORE_FT
PAST MEDICAL HISTORY:  HLD (hyperlipidemia)     HTN (hypertension)     MR (mitral regurgitation) S/P mechanical MVR on 6/7/17 at [facility]    MVP (mitral valve prolapse)     Osteomyelitis of arm Right arm osteomyelitis, treated in [facility] in 1998

## 2024-10-25 NOTE — ED PROVIDER NOTE - PATIENT PORTAL LINK FT
You can access the FollowMyHealth Patient Portal offered by [facility] by registering at the following website: [email or web address]. By joining [employer]’s FollowMyHealth portal, you will also be able to view your health information using other applications (apps) compatible with our system.

## 2024-10-25 NOTE — ED PROVIDER NOTE - PROGRESS NOTE DETAILS
Patient was signed out to me pending CT of the abdomen pelvis.  This shows evidence of epiploic appendagitis.  There is possible evidence of cystitis, urinalysis shows a positive leukocyte esterase so the patient will be empirically covered for urinary tract infection.  Patient recommend to have nonemergent pulmonary imaging as well as a nonemergent colonoscopy.    This time, we believe that the patient is safe for discharge to home with outpatient follow-up with her PCP.  She expresses understanding and is amenable to this plan.

## 2024-10-25 NOTE — ED ADULT NURSE NOTE - NSICDXPASTSURGICALHX_GEN_ALL_CORE_FT
PAST SURGICAL HISTORY:  S/P      S/P MVR (mitral valve replacement) Mechanical MVR at [facility] on 17

## 2024-10-25 NOTE — ED PROVIDER NOTE - NS ED ATTENDING STATEMENT MOD
I have seen and examined this patient and fully participated in the care of this patient as the teaching attending.  The service was shared with the JOYCE.  I reviewed and verified the documentation.

## 2024-10-26 VITALS
SYSTOLIC BLOOD PRESSURE: 100 MMHG | OXYGEN SATURATION: 97 % | HEART RATE: 72 BPM | RESPIRATION RATE: 17 BRPM | DIASTOLIC BLOOD PRESSURE: 68 MMHG | TEMPERATURE: 98 F

## 2024-10-26 LAB
CULTURE RESULTS: SIGNIFICANT CHANGE UP
SPECIMEN SOURCE: SIGNIFICANT CHANGE UP

## 2024-10-26 RX ORDER — NITROFURANTOIN MONOHYD/M-CRYST 100 MG
1 CAPSULE ORAL
Qty: 14 | Refills: 0
Start: 2024-10-26 | End: 2024-11-01

## 2024-10-26 RX ORDER — CEPHALEXIN 500 MG
1 CAPSULE ORAL
Qty: 20 | Refills: 0
Start: 2024-10-26 | End: 2024-11-04

## 2024-10-26 NOTE — ED ADULT NURSE REASSESSMENT NOTE - NS ED NURSE REASSESS COMMENT FT1
Pt resting on stretcher. Safety maintained. Updates given. IV site & patency inspected and integrity maintained. Pt NAD, asleep. NPO status maintained per MD Bowers pending CT result. Will continue to monitor.
